# Patient Record
Sex: MALE | Race: BLACK OR AFRICAN AMERICAN | Employment: OTHER | URBAN - METROPOLITAN AREA
[De-identification: names, ages, dates, MRNs, and addresses within clinical notes are randomized per-mention and may not be internally consistent; named-entity substitution may affect disease eponyms.]

---

## 2017-03-26 ENCOUNTER — ED HISTORICAL/CONVERTED ENCOUNTER (OUTPATIENT)
Dept: OTHER | Age: 62
End: 2017-03-26

## 2017-03-30 ENCOUNTER — OP HISTORICAL/CONVERTED ENCOUNTER (OUTPATIENT)
Dept: OTHER | Age: 62
End: 2017-03-30

## 2017-12-05 ENCOUNTER — ED HISTORICAL/CONVERTED ENCOUNTER (OUTPATIENT)
Dept: OTHER | Age: 62
End: 2017-12-05

## 2018-04-20 ENCOUNTER — ED HISTORICAL/CONVERTED ENCOUNTER (OUTPATIENT)
Dept: OTHER | Age: 63
End: 2018-04-20

## 2018-12-12 ENCOUNTER — ED HISTORICAL/CONVERTED ENCOUNTER (OUTPATIENT)
Dept: OTHER | Age: 63
End: 2018-12-12

## 2018-12-14 ENCOUNTER — OP HISTORICAL/CONVERTED ENCOUNTER (OUTPATIENT)
Dept: OTHER | Age: 63
End: 2018-12-14

## 2019-02-19 ENCOUNTER — OP HISTORICAL/CONVERTED ENCOUNTER (OUTPATIENT)
Dept: OTHER | Age: 64
End: 2019-02-19

## 2020-08-05 ENCOUNTER — ED HISTORICAL/CONVERTED ENCOUNTER (OUTPATIENT)
Dept: OTHER | Age: 65
End: 2020-08-05

## 2020-11-10 ENCOUNTER — HOSPITAL ENCOUNTER (EMERGENCY)
Age: 65
Discharge: HOME | End: 2020-11-10
Attending: EMERGENCY MEDICINE
Payer: COMMERCIAL

## 2020-11-10 ENCOUNTER — APPOINTMENT (OUTPATIENT)
Dept: GENERAL RADIOLOGY | Age: 65
End: 2020-11-10
Attending: EMERGENCY MEDICINE
Payer: COMMERCIAL

## 2020-11-10 VITALS
SYSTOLIC BLOOD PRESSURE: 132 MMHG | WEIGHT: 212 LBS | HEIGHT: 65 IN | DIASTOLIC BLOOD PRESSURE: 77 MMHG | BODY MASS INDEX: 35.32 KG/M2 | OXYGEN SATURATION: 97 % | RESPIRATION RATE: 18 BRPM | HEART RATE: 81 BPM | TEMPERATURE: 98.1 F

## 2020-11-10 DIAGNOSIS — M17.11 ARTHRITIS OF RIGHT KNEE: Primary | ICD-10-CM

## 2020-11-10 DIAGNOSIS — M16.11 ARTHRITIS OF RIGHT HIP: ICD-10-CM

## 2020-11-10 PROCEDURE — 74011250637 HC RX REV CODE- 250/637: Performed by: EMERGENCY MEDICINE

## 2020-11-10 PROCEDURE — 99283 EMERGENCY DEPT VISIT LOW MDM: CPT

## 2020-11-10 PROCEDURE — 73562 X-RAY EXAM OF KNEE 3: CPT

## 2020-11-10 PROCEDURE — 73502 X-RAY EXAM HIP UNI 2-3 VIEWS: CPT

## 2020-11-10 RX ORDER — IBUPROFEN AND FAMOTIDINE 800; 26.6 MG/1; MG/1
1 TABLET, COATED ORAL 2 TIMES DAILY
COMMUNITY
End: 2021-07-28 | Stop reason: ALTCHOICE

## 2020-11-10 RX ORDER — LISINOPRIL AND HYDROCHLOROTHIAZIDE 10; 12.5 MG/1; MG/1
1 TABLET ORAL DAILY
COMMUNITY
End: 2021-07-28 | Stop reason: ALTCHOICE

## 2020-11-10 RX ORDER — LANOLIN ALCOHOL/MO/W.PET/CERES
400 CREAM (GRAM) TOPICAL DAILY
COMMUNITY
End: 2021-07-28 | Stop reason: ALTCHOICE

## 2020-11-10 RX ORDER — ACETAMINOPHEN 325 MG/1
650 TABLET ORAL
Status: COMPLETED | OUTPATIENT
Start: 2020-11-10 | End: 2020-11-10

## 2020-11-10 RX ORDER — POTASSIUM CHLORIDE 750 MG/1
10 TABLET, FILM COATED, EXTENDED RELEASE ORAL DAILY
Status: ON HOLD | COMMUNITY
End: 2022-04-21

## 2020-11-10 RX ORDER — NEBIVOLOL 20 MG/1
20 TABLET ORAL DAILY
COMMUNITY

## 2020-11-10 RX ORDER — GUAIFENESIN 100 MG/5ML
81 LIQUID (ML) ORAL DAILY
COMMUNITY
End: 2022-04-22

## 2020-11-10 RX ORDER — ACETAMINOPHEN AND CODEINE PHOSPHATE 300; 30 MG/1; MG/1
1 TABLET ORAL
Qty: 18 TAB | Refills: 0 | Status: SHIPPED | OUTPATIENT
Start: 2020-11-10 | End: 2020-11-13

## 2020-11-10 RX ORDER — ERGOCALCIFEROL 1.25 MG/1
50000 CAPSULE ORAL DAILY
COMMUNITY
End: 2021-07-28 | Stop reason: ALTCHOICE

## 2020-11-10 RX ORDER — SITAGLIPTIN AND METFORMIN HYDROCHLORIDE 50; 1000 MG/1; MG/1
1 TABLET, FILM COATED ORAL 2 TIMES DAILY WITH MEALS
COMMUNITY
End: 2022-09-16

## 2020-11-10 RX ORDER — AMLODIPINE BESYLATE 10 MG/1
10 TABLET ORAL DAILY
COMMUNITY

## 2020-11-10 RX ORDER — DULAGLUTIDE 0.75 MG/.5ML
0.75 INJECTION, SOLUTION SUBCUTANEOUS
COMMUNITY
End: 2021-07-28 | Stop reason: ALTCHOICE

## 2020-11-10 RX ORDER — ROSUVASTATIN CALCIUM 40 MG/1
40 TABLET, COATED ORAL
COMMUNITY
End: 2021-07-28 | Stop reason: ALTCHOICE

## 2020-11-10 RX ADMIN — ACETAMINOPHEN 650 MG: 325 TABLET, FILM COATED ORAL at 16:03

## 2020-11-10 NOTE — ED TRIAGE NOTES
Bilateral knee pain x 6 weeks with pain worse in the right lateral knee; left hip aching, and both ankles hurt; denies injury; ambulates with a cane

## 2020-11-10 NOTE — DISCHARGE INSTRUCTIONS
Medicines as prescribed and follow-up with your PCP in 3 to 5 days as needed. Return to emergency room for any new or worsening symptoms.

## 2020-11-12 NOTE — ED PROVIDER NOTES
EMERGENCY DEPARTMENT HISTORY AND PHYSICAL EXAM      Date: 11/10/2020  Patient Name: Ted Del Rosario    History of Presenting Illness     Chief Complaint   Patient presents with    Knee Pain       History Provided By: Patient    HPI: Ted Del Rosario, 63 y.o. male with a past medical history significant diabetes, hypertension and  & CAD presents to the ED with cc of bilateral knee pain recurrent for the last 6 weeks  There are no other complaints, changes, or physical findings at this time. PCP: Claudette Silber, FNP    No current facility-administered medications on file prior to encounter. Current Outpatient Medications on File Prior to Encounter   Medication Sig Dispense Refill    aspirin 81 mg chewable tablet Take 81 mg by mouth daily.  nebivoloL (Bystolic) 20 mg tablet Take 20 mg by mouth daily.  amLODIPine (NORVASC) 10 mg tablet Take  by mouth daily.  lisinopril-hydroCHLOROthiazide (PRINZIDE, ZESTORETIC) 10-12.5 mg per tablet Take 1 Tab by mouth daily.  rosuvastatin (CRESTOR) 40 mg tablet Take 40 mg by mouth nightly.  SITagliptin-metFORMIN (Janumet) 50-1,000 mg per tablet Take 1 Tab by mouth two (2) times daily (with meals).  ergocalciferol (Vitamin D2) 1,250 mcg (50,000 unit) capsule Take 50,000 Units by mouth daily.  magnesium oxide (MAG-OX) 400 mg tablet Take 400 mg by mouth daily.  potassium chloride SR (KLOR-CON 10) 10 mEq tablet Take 10 mEq by mouth daily.  dulaglutide (Trulicity) 1.04 FD/4.6 mL sub-q pen 0.75 mg by SubCUTAneous route every seven (7) days.  ibuprofen-famotidine (Duexis) 800-26.6 mg tab Take 1 Tab by mouth two (2) times a day.  dapagliflozin (Farxiga) 10 mg tab tablet Take 10 mg by mouth daily. Past History     Past Medical History:  Past Medical History:   Diagnosis Date    CAD (coronary artery disease)     Diabetes ([address]     Hypertension        Past Surgical History:  History reviewed.  No pertinent surgical history. Family History:  History reviewed. No pertinent family history. Social History:  Social History     Tobacco Use    Smoking status: Never Smoker    Smokeless tobacco: Never Used   Substance Use Topics    Alcohol use: Never     Frequency: Never    Drug use: Never       Allergies: Allergies   Allergen Reactions    Cortisone Anaphylaxis         Review of Systems     Review of Systems   Constitutional: Negative. HENT: Negative. Eyes: Negative. Respiratory: Negative. Cardiovascular: Negative. Gastrointestinal: Negative. Endocrine: Negative. Genitourinary: Negative. Musculoskeletal: Positive for arthralgias. Skin: Negative. Allergic/Immunologic: Negative. Neurological: Negative. Hematological: Negative. Psychiatric/Behavioral: Negative. All other systems reviewed and are negative. Physical Exam     Physical Exam  Vitals signs and nursing note reviewed. Constitutional:       General: He is not in acute distress. Appearance: He is well-developed. He is not toxic-appearing or diaphoretic. HENT:      Head: Normocephalic and atraumatic. Nose: Nose normal.      Mouth/Throat:      Mouth: Mucous membranes are moist.      Pharynx: Oropharynx is clear. Eyes:      Extraocular Movements: Extraocular movements intact. Pupils: Pupils are equal, round, and reactive to light. Neck:      Musculoskeletal: Normal range of motion and neck supple. Cardiovascular:      Rate and Rhythm: Normal rate and regular rhythm. Heart sounds: Normal heart sounds. Pulmonary:      Effort: Pulmonary effort is normal. No respiratory distress. Breath sounds: Normal breath sounds. Chest:      Chest wall: No mass or tenderness. Abdominal:      General: Bowel sounds are normal. There is no abdominal bruit. Palpations: Abdomen is soft. There is no hepatomegaly. Tenderness: There is no abdominal tenderness. There is no rebound. Musculoskeletal: Normal range of motion. General: Tenderness present. Right lower leg: He exhibits no tenderness. No edema. Left lower leg: He exhibits no tenderness. No edema. Comments: Right knee   Skin:     General: Skin is warm and dry. Capillary Refill: Capillary refill takes less than 2 seconds. Neurological:      General: No focal deficit present. Mental Status: He is alert and oriented to person, place, and time. Psychiatric:         Mood and Affect: Mood normal.         Behavior: Behavior normal.         Lab and Diagnostic Study Results     Labs -   No results found for this or any previous visit (from the past 12 hour(s)). Radiologic Studies -     CT Results  (Last 48 hours)    None        CXR Results  (Last 48 hours)    None            Medical Decision Making   - I am the first provider for this patient. - I reviewed the vital signs, available nursing notes, past medical history, past surgical history, family history and social history. - Initial assessment performed. The patients presenting problems have been discussed, and they are in agreement with the care plan formulated and outlined with them. I have encouraged them to ask questions as they arise throughout their visit. Vital Signs-Reviewed the patient's vital signs. No data found. Records Reviewed: Nursing Notes      ED Course/Provider Notes (Medical Decision Making): Uneventful ED course, clinical improvement with therapy, patient will be discharged to followup with PCP as directed    Disposition     Disposition: Condition stable and improved  DC- Adult Discharges: All of the diagnostic tests were reviewed and questions answered. Diagnosis, care plan and treatment options were discussed. The patient understands the instructions and will follow up as directed. The patients results have been reviewed with them. They have been counseled regarding their diagnosis.   The patient verbally convey understanding and agreement of the signs, symptoms, diagnosis, treatment and prognosis and additionally agrees to follow up as recommended with their PCP in 24 - 48 hours. They also agree with the care-plan and convey that all of their questions have been answered. I have also put together some discharge instructions for them that include: 1) educational information regarding their diagnosis, 2) how to care for their diagnosis at home, as well a 3) list of reasons why they would want to return to the ED prior to their follow-up appointment, should their condition change. Discharged    DISCHARGE PLAN:  1. Cannot display discharge medications since this patient is not currently admitted. 2.   Follow-up Information     Follow up With Specialties Details Why Contact Info    Claudette Silber, [address] Nurse Practitioner In 3 days  [address]  [facility] [address]  [phone]          3. Return to ED if worse   4. Discharge Medication List as of 11/10/2020  4:46 PM      START taking these medications    Details   acetaminophen-codeine (Tylenol-Codeine #3) 300-30 mg per tablet Take 1 Tab by mouth every four (4) hours as needed for Pain for up to 3 days. Max Daily Amount: 6 Tabs., Normal, Disp-18 Tab,R-0         CONTINUE these medications which have NOT CHANGED    Details   aspirin 81 mg chewable tablet Take 81 mg by mouth daily. , Historical Med      nebivoloL (Bystolic) 20 mg tablet Take 20 mg by mouth daily. , Historical Med      amLODIPine (NORVASC) 10 mg tablet Take  by mouth daily. , Historical Med      lisinopril-hydroCHLOROthiazide (PRINZIDE, ZESTORETIC) 10-12.5 mg per tablet Take 1 Tab by mouth daily. , Historical Med      rosuvastatin (CRESTOR) 40 mg tablet Take 40 mg by mouth nightly., Historical Med      SITagliptin-metFORMIN (Janumet) 50-1,000 mg per tablet Take 1 Tab by mouth two (2) times daily (with meals). , Historical Med      ergocalciferol (Vitamin D2) 1,250 mcg (50,000 unit) capsule Take 50,000 Units by mouth daily. , Historical Med      magnesium oxide (MAG-OX) 400 mg tablet Take 400 mg by mouth daily. , Historical Med      potassium chloride SR (KLOR-CON 10) 10 mEq tablet Take 10 mEq by mouth daily. , Historical Med      dulaglutide (Trulicity) 7.40 AH/6.5 mL sub-q pen 0.75 mg by SubCUTAneous route every seven (7) days. , Historical Med      ibuprofen-famotidine (Duexis) 800-26.6 mg tab Take 1 Tab by mouth two (2) times a day., Historical Med      dapagliflozin (Farxiga) 10 mg tab tablet Take 10 mg by mouth daily. , Historical Med               Diagnosis     Clinical Impression:   1. Arthritis of right knee    2. Arthritis of right hip        Attestations:    Becky Mejia MD    Please note that this dictation was completed with oohilove, the [employer] voice recognition software. Quite often unanticipated grammatical, syntax, homophones, and other interpretive errors are inadvertently transcribed by the computer software. Please disregard these errors. Please excuse any errors that have escaped final proofreading. Thank you.

## 2021-07-05 ENCOUNTER — TRANSCRIBE ORDER (OUTPATIENT)
Dept: SCHEDULING | Age: 66
End: 2021-07-05

## 2021-07-05 DIAGNOSIS — M54.50 LOW BACK PAIN: Primary | ICD-10-CM

## 2021-07-22 ENCOUNTER — HOSPITAL ENCOUNTER (OUTPATIENT)
Dept: MRI IMAGING | Age: 66
Discharge: HOME | End: 2021-07-22
Payer: COMMERCIAL

## 2021-07-22 DIAGNOSIS — M54.50 LOW BACK PAIN: ICD-10-CM

## 2021-07-22 PROCEDURE — 72148 MRI LUMBAR SPINE W/O DYE: CPT

## 2021-07-28 ENCOUNTER — OFFICE VISIT (OUTPATIENT)
Dept: ENDOCRINOLOGY | Age: 66
End: 2021-07-28
Payer: COMMERCIAL

## 2021-07-28 VITALS
DIASTOLIC BLOOD PRESSURE: 88 MMHG | OXYGEN SATURATION: 98 % | BODY MASS INDEX: 36.24 KG/M2 | HEART RATE: 66 BPM | SYSTOLIC BLOOD PRESSURE: 147 MMHG | TEMPERATURE: 97.7 F | WEIGHT: 217.5 LBS | HEIGHT: 65 IN

## 2021-07-28 DIAGNOSIS — E83.52 HYPERCALCEMIA: Primary | ICD-10-CM

## 2021-07-28 DIAGNOSIS — I10 BENIGN ESSENTIAL HTN: ICD-10-CM

## 2021-07-28 DIAGNOSIS — E55.9 VITAMIN D DEFICIENCY: ICD-10-CM

## 2021-07-28 PROBLEM — E78.2 MIXED HYPERLIPIDEMIA: Status: ACTIVE | Noted: 2021-07-28

## 2021-07-28 PROBLEM — E11.65 TYPE 2 DIABETES MELLITUS WITH HYPERGLYCEMIA, WITHOUT LONG-TERM CURRENT USE OF INSULIN (HCC): Status: ACTIVE | Noted: 2021-07-28

## 2021-07-28 PROCEDURE — 99204 OFFICE O/P NEW MOD 45 MIN: CPT | Performed by: INTERNAL MEDICINE

## 2021-07-28 RX ORDER — ACETAMINOPHEN 500 MG
2000 TABLET ORAL DAILY
Qty: 30 CAPSULE | Refills: 3 | Status: SHIPPED | OUTPATIENT
Start: 2021-07-28 | End: 2021-08-27

## 2021-07-28 RX ORDER — LISINOPRIL 40 MG/1
40 TABLET ORAL DAILY
Qty: 30 TABLET | Refills: 3 | Status: SHIPPED | OUTPATIENT
Start: 2021-07-28 | End: 2021-08-27

## 2021-07-28 RX ORDER — TRAMADOL HYDROCHLORIDE 50 MG/1
50 TABLET ORAL EVERY 12 HOURS
Status: ON HOLD | COMMUNITY
Start: 2021-06-10 | End: 2022-04-22 | Stop reason: SDUPTHER

## 2021-07-28 RX ORDER — LISINOPRIL AND HYDROCHLOROTHIAZIDE 12.5; 2 MG/1; MG/1
TABLET ORAL DAILY
COMMUNITY
End: 2021-09-09 | Stop reason: ALTCHOICE

## 2021-07-28 NOTE — PROGRESS NOTES
History and Physical    Patient: Franci Mark MRN: [id number]  SSN: [id number]    YOB: 1955  Age: 61 y.o. Sex: male      Subjective:      Franci Mark is a 61 y.o. male with past medical history of type 2 diabetes mellitus, hypertension, coronary artery disease, hyperlipidemia is sent to me by primary care provider Ana Panda NP for hypercalcemia. Onset: 1 year back    Symptoms of hypercalcemia: Constipation, fatigue. Denies any polyuria, polydipsia, stomach pain, kidney stones, bone fractures, episodes of confusion. Lithium/ HCTZ: Patient is taking lisinopril-hydrochlorothiazide 20-12.5 mg daily for hypertension, he has been on this for the past 7 years or more    Calcium supplement: No calcium tablets, no Tums, no significant dairy intake    Vit D supplement: He was on vitamin D 50,000 units once a week which was stopped a few months back    Osteoporosis/fragility fracture: None    Dental issues: No current dental issues    Family history of hypercalcemia/hyperparathyroidism/osteoporosis: None to his knowledge    Past Medical History:   Diagnosis Date    CAD (coronary artery disease)     Diabetes ([facility] [address]     Hypertension      Past Surgical History:   Procedure Laterality Date    HX CATARACT REMOVAL Bilateral       Family History   Problem Relation Age of Onset    Diabetes Mother      Social History     Tobacco Use    Smoking status: Never Smoker    Smokeless tobacco: Never Used   Substance Use Topics    Alcohol use: Never      Prior to Admission medications    Medication Sig Start Date End Date Taking? Authorizing Provider   traMADoL Sheba Sauceda) 50 mg tablet  6/10/21  Yes Provider, Historical   lisinopril-hydroCHLOROthiazide (PRINZIDE, ZESTORETIC) 20-12.5 mg per tablet Take  by mouth daily. Yes Provider, Historical   lisinopriL (PRINIVIL, ZESTRIL) 40 mg tablet Take 1 Tablet by mouth daily for 30 days.  7/28/21 8/27/21 Yes Justina Paul MD   cholecalciferol (VITAMIN D3) (2,000 UNITS /50 MCG) cap capsule Take 1 Capsule by mouth daily for 30 days. 7/28/21 8/27/21 Yes Edilson Rios MD   aspirin 81 mg chewable tablet Take 81 mg by mouth daily. Yes Other, MD [place]   nebivoloL (Bystolic) 20 mg tablet Take 20 mg by mouth daily. Yes Other, MD [place]   amLODIPine (NORVASC) 10 mg tablet Take  by mouth daily. Yes Other, MD [place]   SITagliptin-metFORMIN (Janumet) 50-1,000 mg per tablet Take 1 Tab by mouth two (2) times daily (with meals). Yes Other, MD [place]   potassium chloride SR (KLOR-CON 10) 10 mEq tablet Take 10 mEq by mouth daily. Yes Shruti, MD [place]   dapagliflozin (Farxiga) 10 mg tab tablet Take 10 mg by mouth daily. Yes Other, MD [place]        Allergies   Allergen Reactions    Cortisone Anaphylaxis       Review of Systems:  ROS    A comprehensive review of systems was preformed and it is negative except numbness and tingling, memory issues, anxiety, sleep problems, back pain    Objective:     Vitals:    07/28/21 1552 07/28/21 1559   BP: (!) 156/85 (!) 147/88   Pulse: 73 66   Temp: 97.7 °F (36.5 °C)    TempSrc: Temporal    SpO2: 98%    Weight: 217 lb 8 oz (98.7 kg)    Height: 5' 5\" (1.651 m)         Physical Exam:    Physical Exam  Vitals and nursing note reviewed. Constitutional:       Appearance: Normal appearance. HENT:      Head: Normocephalic and atraumatic. Neck:      Comments: Thyroid normal on palpation  Cardiovascular:      Rate and Rhythm: Normal rate and regular rhythm. Pulmonary:      Effort: Pulmonary effort is normal.      Breath sounds: Normal breath sounds. Abdominal:      General: Bowel sounds are normal.      Palpations: Abdomen is soft. Musculoskeletal:         General: No swelling. Normal range of motion. Cervical back: Neck supple. Skin:     General: Skin is warm. Neurological:      General: No focal deficit present. Mental Status: He is alert and oriented to person, place, and time.    Psychiatric:         Mood and Affect: Mood normal.         Behavior: Behavior normal.          Labs and Imaging:    Last 3 Recorded Weights in this Encounter    07/28/21 1552   Weight: 217 lb 8 oz (98.7 kg)        No results found for: HBA1C, LRE2AEBT, DGM2DAPS, NCO0UTZD     Assessment:     Patient Active Problem List   Diagnosis Code    Hypercalcemia E83.52    Benign essential HTN I10    Type 2 diabetes mellitus with hyperglycemia, without long-term current use of insulin ([facility] E11.65    Mixed hyperlipidemia E78.2           Plan:     Hypercalcemia:  I reviewed labs and notes from the referring provider's office. :  Calcium slightly elevated at 10.8 (8.6-10.2)  25 hydroxy vitamin D low at 15.1    11-:  Ionized calcium borderline elevated at 5.7 (4.5-5.6)  Magnesium lower end of normal range at 1.6  Phosphorus lower end of normal range at 3 (2.8-4.1)  25 hydroxy vitamin D low at 17.8    1-:  Calcium elevated at 11.1 (8.6-10.2)    3-:  Normal hemoglobin and hematocrit    5-:  Calcium elevated at 10.7 (8.6-10.2)    5-:  25 hydroxy vitamin D low at 16  Ionized calcium slightly elevated at 5.8 (4.5-5.6)  PTH normal at 46 (15-65)  Renal function normal.    Patient is on lisinopril-hydrochlorothiazide 20-12.5 mg daily  Plan:  Clinically patient does not have symptoms of hypercalcemia. Looking at the trend of labs, calcium stays only borderline elevated.   Differential diagnosis: Calcium elevation because of hydrochlorothiazide versus primary hyperparathyroidism  Stop lisinopril-hydrochlorothiazide  Replace it with lisinopril 40 mg daily  Start gentle vitamin D replacement with 2000 units daily  Check the following labs before next visit in 6 weeks    Vitamin D deficiency:  25 hydroxy vitamin D low at 16 on 5-  Agree with avoiding high-dose vitamin D replacement  Start gentle replacement with cholecalciferol 2000 units daily  Check vitamin D level prior to next visit    Mixed hyperlipidemia:  He was on rosuvastatin. Not sure why it was stopped. There is no contraindication to being on rosuvastatin from endocrine standpoint because of hypercalcemia. He will benefit from going back on rosuvastatin given history of diabetes and coronary artery disease. Essential hypertension:  Blood pressure acceptable today on current medications. However I am going to switch him from lisinopril-hydrochlorothiazide 20-12.5 mg daily to lisinopril 40 mg daily because of hypercalcemia. Orders Placed This Encounter    METABOLIC PANEL, COMPREHENSIVE     Standing Status:   Future     Standing Expiration Date:   1/28/2022    PTH INTACT     Standing Status:   Future     Standing Expiration Date:   1/28/2022    PHOSPHORUS     Standing Status:   Future     Standing Expiration Date:   1/28/2022    MAGNESIUM     Standing Status:   Future     Standing Expiration Date:   1/28/2022    VITAMIN D, 25 HYDROXY     Standing Status:   Future     Standing Expiration Date:   1/28/2022    VITAMIN D, 1, 25 DIHYDROXY     Standing Status:   Future     Standing Expiration Date:   1/28/2022    lisinopriL (PRINIVIL, ZESTRIL) 40 mg tablet     Sig: Take 1 Tablet by mouth daily for 30 days. Dispense:  30 Tablet     Refill:  3     DC Lisinopril-HCTZ    cholecalciferol (VITAMIN D3) (2,000 UNITS /50 MCG) cap capsule     Sig: Take 1 Capsule by mouth daily for 30 days.      Dispense:  30 Capsule     Refill:  3        Signed By: Ko Nobles MD     July 28, 2021      Return to clinic 6 weeks

## 2021-07-28 NOTE — LETTER
7/28/2021    Patient: Jacqlyn Curling   YOB: 1955   Date of Visit: 7/28/2021     Milan Gonzales [facility] [address] [address]  Via Fax: [phone]    Dear TANVI Gonzales,      Thank you for referring Mr. Perla Zaragoza to [address] [place] [address] for evaluation. My notes for this consultation are attached. If you have questions, please do not hesitate to call me. I look forward to following your patient along with you.       Sincerely,    Mary Jack MD

## 2021-08-28 DIAGNOSIS — E83.52 HYPERCALCEMIA: ICD-10-CM

## 2021-09-09 ENCOUNTER — OFFICE VISIT (OUTPATIENT)
Dept: ENDOCRINOLOGY | Age: 66
End: 2021-09-09

## 2021-09-09 VITALS
HEIGHT: 65 IN | OXYGEN SATURATION: 98 % | SYSTOLIC BLOOD PRESSURE: 146 MMHG | WEIGHT: 218.3 LBS | BODY MASS INDEX: 36.37 KG/M2 | HEART RATE: 104 BPM | TEMPERATURE: 97.7 F | DIASTOLIC BLOOD PRESSURE: 81 MMHG

## 2021-09-09 RX ORDER — LISINOPRIL 40 MG/1
TABLET ORAL
COMMUNITY
Start: 2021-08-27 | End: 2021-09-27 | Stop reason: SDUPTHER

## 2021-09-09 RX ORDER — EXENATIDE 2 MG/.85ML
INJECTION, SUSPENSION, EXTENDED RELEASE SUBCUTANEOUS
COMMUNITY
Start: 2021-08-27 | End: 2022-09-16

## 2021-09-09 NOTE — PROGRESS NOTES
History and Physical    Patient: Ada Kemp MRN: [id number]  SSN: [id number]    YOB: 1955  Age: 61 y.o. Sex: male      Subjective:      Ada Kemp is a 61 y.o. male with past medical history of type 2 diabetes mellitus, hypertension, coronary artery disease, hyperlipidemia is sent to me by primary care provider Elisabeth Blevins NP for hypercalcemia. Onset: 1 year back    Symptoms of hypercalcemia: Constipation, fatigue. Denies any polyuria, polydipsia, stomach pain, kidney stones, bone fractures, episodes of confusion. Lithium/ HCTZ: Patient is taking lisinoprilhydrochlorothiazide 20-12.5 mg daily for hypertension, he has been on this for the past 7 years or more    Calcium supplement: No calcium tablets, no Tums, no significant dairy intake    Vit D supplement: He was on vitamin D 50,000 units once a week which was stopped a few months back    Osteoporosis/fragility fracture: None    Dental issues: No current dental issues    Family history of hypercalcemia/hyperparathyroidism/osteoporosis: None to his knowledge    Past Medical History:   Diagnosis Date    CAD (coronary artery disease)     [facility] ([facility]     Hypertension      Past Surgical History:   Procedure Laterality Date    HX CATARACT REMOVAL Bilateral       Family History   Problem Relation Age of Onset    Diabetes Mother      Social History     Tobacco Use    Smoking status: Never Smoker    Smokeless tobacco: Never Used   Substance Use Topics    Alcohol use: Never      Prior to Admission medications    Medication Sig Start Date End Date Taking? Authorizing Provider   Cindy BCise 2 mg/0.85 mL atIn  8/27/21  Yes Provider, Historical   lisinopriL (PRINIVIL, ZESTRIL) 40 mg tablet  8/27/21  Yes Provider, Historical   traMADoL (ULTRAM) 50 mg tablet  6/10/21  Yes Provider, Historical   aspirin 81 mg chewable tablet Take 81 mg by mouth daily.    Yes Other, MD [place]   nebivoloL (Bystolic) 20 mg tablet Take 20 mg by mouth daily. Yes Other, MD [place]   amLODIPine (NORVASC) 10 mg tablet Take  by mouth daily. Yes Other, MD [place]   SITagliptin-metFORMIN (Janumet) 50-1,000 mg per tablet Take 1 Tab by mouth two (2) times daily (with meals). Yes Other, MD [place]   potassium chloride SR (KLOR-CON 10) 10 mEq tablet Take 10 mEq by mouth daily. Yes Shruti, MD [place]   dapagliflozin (Farxiga) 10 mg tab tablet Take 10 mg by mouth daily. Yes Shruti, MD [place]        Allergies   Allergen Reactions    Cortisone Anaphylaxis       Review of Systems:  ROS    A comprehensive review of systems was preformed and it is negative except numbness and tingling, memory issues, anxiety, sleep problems, back pain    Objective:     Vitals:    09/09/21 1620   BP: (!) 146/81   Pulse: (!) 104   Temp: 97.7 °F (36.5 °C)   TempSrc: Temporal   SpO2: 98%   Weight: 218 lb 4.8 oz (99 kg)   Height: 5' 5\" (1.651 m)        Physical Exam:    Physical Exam  Vitals and nursing note reviewed. Constitutional:       Appearance: Normal appearance. HENT:      Head: Normocephalic and atraumatic. Neck:      Comments: Thyroid normal on palpation  Cardiovascular:      Rate and Rhythm: Normal rate and regular rhythm. Pulmonary:      Effort: Pulmonary effort is normal.      Breath sounds: Normal breath sounds. Abdominal:      General: Bowel sounds are normal.      Palpations: Abdomen is soft. Musculoskeletal:         General: No swelling. Normal range of motion. Cervical back: Neck supple. Skin:     General: Skin is warm. Neurological:      General: No focal deficit present. Mental Status: He is alert and oriented to person, place, and time.    Psychiatric:         Mood and Affect: Mood normal.         Behavior: Behavior normal.          Labs and Imaging:    Last 3 Recorded Weights in this Encounter    09/09/21 1620   Weight: 218 lb 4.8 oz (99 kg)        No results found for: HBA1C, UUT5POPF, XNF8OBOQ, HPG1QFCI     Assessment:     Patient Active Problem List   Diagnosis Code    Hypercalcemia E83.52    Benign essential HTN I10    Type 2 diabetes mellitus with hyperglycemia, without long-term current use of insulin ([facility]) E11.65    Mixed hyperlipidemia E78.2           Plan:     Hypercalcemia:  408534:  Calcium slightly elevated at 10.8 (8.610.2)  25 hydroxy vitamin D low at 15.1    11-:  Ionized calcium borderline elevated at 5.7 (4.55.6)  Magnesium lower end of normal range at 1.6  Phosphorus lower end of normal range at 3 (2.84.1)  25 hydroxy vitamin D low at 17.8    1-:  Calcium elevated at 11.1 (8.610.2)    3-:  Normal hemoglobin and hematocrit    5-:  Calcium elevated at 10.7 (8.610.2)    5-:  25 hydroxy vitamin D low at 16  Ionized calcium slightly elevated at 5.8 (4.55.6)  PTH normal at 46 (1565)  Renal function normal.    Patient is on lisinoprilhydrochlorothiazide 20-12.5 mg daily  Plan:  Clinically patient does not have symptoms of hypercalcemia. Looking at the trend of labs, calcium stays only borderline elevated. Differential diagnosis: Calcium elevation because of hydrochlorothiazide versus primary hyperparathyroidism  Stop lisinoprilhydrochlorothiazide  Replace it with lisinopril 40 mg daily  Start gentle vitamin D replacement with 2000 units daily  Check the following labs before next visit in 6 weeks    Vitamin D deficiency:  25 hydroxy vitamin D low at 16 on 5-  Agree with avoiding high-dose vitamin D replacement  Start gentle replacement with cholecalciferol 2000 units daily  Check vitamin D level prior to next visit    Mixed hyperlipidemia:  He was on rosuvastatin. Not sure why it was stopped. There is no contraindication to being on rosuvastatin from endocrine standpoint because of hypercalcemia. He will benefit from going back on rosuvastatin given history of diabetes and coronary artery disease. Essential hypertension:  Blood pressure acceptable today on current medications.   However I am going to switch him from lisinoprilhydrochlorothiazide 20-12.5 mg daily to lisinopril 40 mg daily because of hypercalcemia. No orders of the defined types were placed in this encounter.        Signed By: Sarwat Alfaro MD     September 9, 2021      Return to clinic 6 weeks

## 2021-09-11 LAB
1,25(OH)2D SERPL-MCNC: 46.7 PG/ML (ref 19.9–79.3)
25(OH)D3+25(OH)D2 SERPL-MCNC: 18.5 NG/ML (ref 30–100)
ALBUMIN SERPL-MCNC: 4.3 G/DL (ref 3.8–4.8)
ALBUMIN/GLOB SERPL: 1.3 {RATIO} (ref 1.2–2.2)
ALP SERPL-CCNC: 91 IU/L (ref 48–121)
ALT SERPL-CCNC: 33 IU/L (ref 0–44)
AST SERPL-CCNC: 26 IU/L (ref 0–40)
BILIRUB SERPL-MCNC: 0.4 MG/DL (ref 0–1.2)
BUN SERPL-MCNC: 9 MG/DL (ref 8–27)
BUN/CREAT SERPL: 9 (ref 10–24)
CALCIUM SERPL-MCNC: 10.5 MG/DL (ref 8.6–10.2)
CHLORIDE SERPL-SCNC: 106 MMOL/L (ref 96–106)
CO2 SERPL-SCNC: 24 MMOL/L (ref 20–29)
CREAT SERPL-MCNC: 1.03 MG/DL (ref 0.76–1.27)
GLOBULIN SER CALC-MCNC: 3.3 G/DL (ref 1.5–4.5)
GLUCOSE SERPL-MCNC: 164 MG/DL (ref 65–99)
MAGNESIUM SERPL-MCNC: 1.8 MG/DL (ref 1.6–2.3)
PHOSPHATE SERPL-MCNC: 3 MG/DL (ref 2.8–4.1)
POTASSIUM SERPL-SCNC: 3.9 MMOL/L (ref 3.5–5.2)
PROT SERPL-MCNC: 7.6 G/DL (ref 6–8.5)
PTH-INTACT SERPL-MCNC: 67 PG/ML (ref 15–65)
SODIUM SERPL-SCNC: 140 MMOL/L (ref 134–144)

## 2021-09-27 ENCOUNTER — OFFICE VISIT (OUTPATIENT)
Dept: ENDOCRINOLOGY | Age: 66
End: 2021-09-27
Payer: COMMERCIAL

## 2021-09-27 VITALS
TEMPERATURE: 97.7 F | SYSTOLIC BLOOD PRESSURE: 159 MMHG | HEART RATE: 75 BPM | WEIGHT: 219 LBS | DIASTOLIC BLOOD PRESSURE: 92 MMHG | BODY MASS INDEX: 36.49 KG/M2 | OXYGEN SATURATION: 98 % | HEIGHT: 65 IN

## 2021-09-27 DIAGNOSIS — E21.0 PRIMARY HYPERPARATHYROIDISM (HCC): Primary | ICD-10-CM

## 2021-09-27 DIAGNOSIS — E55.9 VITAMIN D DEFICIENCY: ICD-10-CM

## 2021-09-27 DIAGNOSIS — I10 BENIGN ESSENTIAL HTN: ICD-10-CM

## 2021-09-27 PROCEDURE — 99214 OFFICE O/P EST MOD 30 MIN: CPT | Performed by: INTERNAL MEDICINE

## 2021-09-27 RX ORDER — ACETAMINOPHEN 500 MG
2000 TABLET ORAL 2 TIMES DAILY
Qty: 60 CAPSULE | Refills: 3 | Status: SHIPPED | OUTPATIENT
Start: 2021-09-27 | End: 2021-11-30 | Stop reason: SDUPTHER

## 2021-09-27 RX ORDER — LISINOPRIL 40 MG/1
40 TABLET ORAL DAILY
Qty: 90 TABLET | Refills: 1 | Status: SHIPPED | OUTPATIENT
Start: 2021-09-27 | End: 2022-01-20 | Stop reason: SDUPTHER

## 2021-09-27 RX ORDER — GABAPENTIN 300 MG/1
300 CAPSULE ORAL 2 TIMES DAILY
COMMUNITY
Start: 2021-09-16 | End: 2022-05-12

## 2021-09-27 NOTE — PROGRESS NOTES
History and Physical    Patient: Colin Hood MRN: [id number]  SSN: [id number]    YOB: 1955  Age: 61 y.o. Sex: male      Subjective:      Colin Hood is a 61 y.o. male with past medical history of type 2 diabetes mellitus, hypertension, coronary artery disease, hyperlipidemia is here for follow-up of hypercalcemia. He was sent to me by primary care provider Stan Rose NP     at the last visit we stopped lisinopril-hydrochlorothiazide and switched him to just lisinopril 40 mg daily. After this patient had labs done to follow-up on hypercalcemia. Denies any change in his symptoms. Also at the last visit he was started on vitamin D 2000 units every day for vitamin D deficiency. Onset: 1 year back    Symptoms of hypercalcemia: Constipation, fatigue. Denies any polyuria, polydipsia, stomach pain, kidney stones, bone fractures, episodes of confusion. Lithium/ HCTZ: Patient is taking lisinopril-hydrochlorothiazide 20-12.5 mg daily for hypertension, he has been on this for the past 7 years or more    Calcium supplement: No calcium tablets, no Tums, no significant dairy intake    Vit D supplement: He was on vitamin D 50,000 units once a week which was stopped a few months back    Osteoporosis/fragility fracture: None    Dental issues: No current dental issues    Family history of hypercalcemia/hyperparathyroidism/osteoporosis: None to his knowledge    Past Medical History:   Diagnosis Date    CAD (coronary artery disease)     Diabetes ([facility]     Hypertension      Past Surgical History:   Procedure Laterality Date    HX CATARACT REMOVAL Bilateral       Family History   Problem Relation Age of Onset    Diabetes Mother      Social History     Tobacco Use    Smoking status: Never Smoker    Smokeless tobacco: Never Used   Substance Use Topics    Alcohol use: Never      Prior to Admission medications    Medication Sig Start Date End Date Taking?  Authorizing Provider gabapentin (NEURONTIN) 300 mg capsule  9/16/21  Yes Provider, Historical   cholecalciferol (VITAMIN D3) (2,000 UNITS /50 MCG) cap capsule Take 1 Capsule by mouth two (2) times a day for 30 days. 9/27/21 10/27/21 Yes Silvia Brittle, MD   lisinopriL (PRINIVIL, ZESTRIL) 40 mg tablet Take 1 Tablet by mouth daily for 90 days. 9/27/21 12/26/21 Yes Silvia Brittle, MD   Bydureon BCise 2 mg/0.85 mL atIn  8/27/21  Yes Provider, Historical   traMADoL (ULTRAM) 50 mg tablet  6/10/21  Yes Provider, Historical   aspirin 81 mg chewable tablet Take 81 mg by mouth daily. Yes Other, MD Clinton   nebivoloL (Bystolic) 20 mg tablet Take 20 mg by mouth daily. Yes Other, MD Clinton   amLODIPine (NORVASC) 10 mg tablet Take  by mouth daily. Yes Other, MD Clinton   SITagliptin-metFORMIN (Janumet) 50-1,000 mg per tablet Take 1 Tab by mouth two (2) times daily (with meals). Yes Other, MD Clinton   potassium chloride SR (KLOR-CON 10) 10 mEq tablet Take 10 mEq by mouth daily. Yes Other, MD Clinton   dapagliflozin (Farxiga) 10 mg tab tablet Take 10 mg by mouth daily. Yes Other, MD Clinton        Allergies   Allergen Reactions    Cortisone Anaphylaxis       Review of Systems:  ROS    A comprehensive review of systems was preformed and it is negative except mentioned in HPI    Objective:     Vitals:    09/27/21 1045 09/27/21 1052   BP: (!) 160/89 (!) 159/92   Pulse: 86 75   Temp: 97.7 °F (36.5 °C)    TempSrc: Temporal    SpO2: 98%    Weight: 219 lb (99.3 kg)    Height: 5' 5\" (1.651 m)         Physical Exam:    Physical Exam  Vitals and nursing note reviewed. Constitutional:       Appearance: Normal appearance. HENT:      Head: Normocephalic and atraumatic. Cardiovascular:      Rate and Rhythm: Normal rate and regular rhythm. Pulmonary:      Effort: Pulmonary effort is normal.      Breath sounds: Normal breath sounds. Neurological:      Mental Status: He is alert.           Labs and Imaging:    Last 3 Recorded Weights in this Encounter 09/27/21 1045   Weight: 219 lb (99.3 kg)        No results found for: HBA1C, TNL6XPIA, HGV2NYQC, KIE0LDAJ     Assessment:     Patient Active Problem List   Diagnosis Code    Hypercalcemia E83.52    Benign essential HTN I10    Type 2 diabetes mellitus with hyperglycemia, without long-term current use of insulin ([facility] E11.65    Mixed hyperlipidemia E78.2           Plan:     Hypercalcemia:  :  Calcium slightly elevated at 10.8 (8.6-10.2)  25 hydroxy vitamin D low at 15.1    11-:  Ionized calcium borderline elevated at 5.7 (4.5-5.6)  Magnesium lower end of normal range at 1.6  Phosphorus lower end of normal range at 3 (2.8-4.1)  25 hydroxy vitamin D low at 17.8    1-:  Calcium elevated at 11.1 (8.6-10.2)    3-:  Normal hemoglobin and hematocrit    5-:  Calcium elevated at 10.7 (8.6-10.2)    5-:  25 hydroxy vitamin D low at 16  Ionized calcium slightly elevated at 5.8 (4.5-5.6)  PTH normal at 46 (15-65)  Renal function normal.    Patient is on lisinopril-hydrochlorothiazide 20-12.5 mg daily    Switched to plain lisinopril    9-:  Calcium borderline elevated at 10.5 (8.6-10.2)  Phosphorus normal at 3  Magnesium normal at 1.8  PTH elevated at 67 (15-65)  25 hydroxy vitamin D low at 18.5  1, 25 dihydroxy vitamin D normal at 46.7 (19.9-79. 3)  Plan:  It appears that patient has primary hyper parathyroidism. Currently calcium elevation is mild, may be because of vitamin D deficiency. Optimize vitamin D replacement and check labs including 24-hour urine calcium and bone density scan prior to next visit in 2 months to see if patient meets criteria for surgery. Vitamin D deficiency:  25 hydroxy vitamin D low at 12 on 5-  Agree with avoiding high-dose vitamin D replacement  Start gentle replacement with cholecalciferol 2000 units daily    9-:  25 hydroxy vitamin D low at 18.5  Plan:  Increase vitamin D to 2000 units twice a day.   Check vitamin D level prior to next visit. Mixed hyperlipidemia:  He was on rosuvastatin. Not sure why it was stopped. There is no contraindication to being on rosuvastatin from endocrine standpoint because of hypercalcemia. He will benefit from going back on rosuvastatin given history of diabetes and coronary artery disease. Essential hypertension:  Blood pressure elevated today as patient did not take blood pressure medication this morning. I will reevaluate at next visit. Continue to stay off hydrochlorothiazide. Orders Placed This Encounter    DEXA BONE DENSITY STUDY AXIAL     Standing Status:   Future     Standing Expiration Date:   10/27/2022    CALCIUM, UR, 24 HR    CREATININE, UR, 24 HR     Order Specific Question:   Has the patient fasted? Answer:   No     Order Specific Question:   Patient Height     Answer:   5     Order Specific Question:   Patient Weight     Answer:   517    METABOLIC PANEL, COMPREHENSIVE     Standing Status:   Future     Standing Expiration Date:   3/27/2022    VITAMIN D, 25 HYDROXY     Standing Status:   Future     Standing Expiration Date:   3/27/2022    PTH INTACT     Standing Status:   Future     Standing Expiration Date:   3/27/2022    PHOSPHORUS     Standing Status:   Future     Standing Expiration Date:   3/27/2022    cholecalciferol (VITAMIN D3) (2,000 UNITS /50 MCG) cap capsule     Sig: Take 1 Capsule by mouth two (2) times a day for 30 days. Dispense:  60 Capsule     Refill:  3    lisinopriL (PRINIVIL, ZESTRIL) 40 mg tablet     Sig: Take 1 Tablet by mouth daily for 90 days.      Dispense:  90 Tablet     Refill:  1     DC lisinopril-HCTZ        Signed By: Shelby Alves MD     September 27, 2021      Return to clinic 2 months

## 2021-09-27 NOTE — LETTER
9/27/2021    Patient: Carson Solorzaon   YOB: 1955   Date of Visit: 9/27/2021     [facility] Melo [facility] [address] [address]  Via Fax: [phone]    Dear TANVI Melo,      Thank you for referring Mr. Prashanth Gamble to [address] [place] [address] for evaluation. My notes for this consultation are attached. If you have questions, please do not hesitate to call me. I look forward to following your patient along with you.       Sincerely,    Lamine Luz MD

## 2021-10-07 ENCOUNTER — HOSPITAL ENCOUNTER (OUTPATIENT)
Dept: MAMMOGRAPHY | Age: 66
Discharge: HOME | End: 2021-10-07
Attending: INTERNAL MEDICINE
Payer: COMMERCIAL

## 2021-10-07 DIAGNOSIS — E21.0 PRIMARY HYPERPARATHYROIDISM (HCC): ICD-10-CM

## 2021-10-07 PROCEDURE — 77080 DXA BONE DENSITY AXIAL: CPT

## 2021-11-24 LAB
25(OH)D3+25(OH)D2 SERPL-MCNC: 33.3 NG/ML (ref 30–100)
ALBUMIN SERPL-MCNC: 4.2 G/DL (ref 3.8–4.8)
ALBUMIN/GLOB SERPL: 1.2 {RATIO} (ref 1.2–2.2)
ALP SERPL-CCNC: 89 IU/L (ref 44–121)
ALT SERPL-CCNC: 29 IU/L (ref 0–44)
AST SERPL-CCNC: 31 IU/L (ref 0–40)
BILIRUB SERPL-MCNC: 0.7 MG/DL (ref 0–1.2)
BUN SERPL-MCNC: 11 MG/DL (ref 8–27)
BUN/CREAT SERPL: 10 (ref 10–24)
CALCIUM 24H UR-MCNC: 15.5 MG/DL
CALCIUM 24H UR-MRATE: 186 MG/24 HR (ref 0–320)
CALCIUM SERPL-MCNC: 10.4 MG/DL (ref 8.6–10.2)
CHLORIDE SERPL-SCNC: 106 MMOL/L (ref 96–106)
CO2 SERPL-SCNC: 26 MMOL/L (ref 20–29)
CREAT 24H UR-MRATE: 1440 MG/24 HR (ref 1000–2000)
CREAT SERPL-MCNC: 1.09 MG/DL (ref 0.76–1.27)
CREAT UR-MCNC: 120 MG/DL
GLOBULIN SER CALC-MCNC: 3.4 G/DL (ref 1.5–4.5)
GLUCOSE SERPL-MCNC: 186 MG/DL (ref 65–99)
PHOSPHATE SERPL-MCNC: 3.5 MG/DL (ref 2.8–4.1)
POTASSIUM SERPL-SCNC: 3.7 MMOL/L (ref 3.5–5.2)
PROT SERPL-MCNC: 7.6 G/DL (ref 6–8.5)
PTH-INTACT SERPL-MCNC: 61 PG/ML (ref 15–65)
SODIUM SERPL-SCNC: 145 MMOL/L (ref 134–144)

## 2021-11-27 DIAGNOSIS — E21.0 PRIMARY HYPERPARATHYROIDISM (HCC): ICD-10-CM

## 2021-11-30 ENCOUNTER — OFFICE VISIT (OUTPATIENT)
Dept: ENDOCRINOLOGY | Age: 66
End: 2021-11-30
Payer: COMMERCIAL

## 2021-11-30 VITALS
DIASTOLIC BLOOD PRESSURE: 86 MMHG | SYSTOLIC BLOOD PRESSURE: 148 MMHG | HEART RATE: 89 BPM | TEMPERATURE: 97.5 F | BODY MASS INDEX: 36.12 KG/M2 | HEIGHT: 65 IN | WEIGHT: 216.8 LBS | OXYGEN SATURATION: 99 %

## 2021-11-30 DIAGNOSIS — E55.9 VITAMIN D DEFICIENCY: ICD-10-CM

## 2021-11-30 DIAGNOSIS — E21.0 PRIMARY HYPERPARATHYROIDISM (HCC): Primary | ICD-10-CM

## 2021-11-30 PROCEDURE — 99214 OFFICE O/P EST MOD 30 MIN: CPT | Performed by: INTERNAL MEDICINE

## 2021-11-30 RX ORDER — ACETAMINOPHEN 500 MG
2000 TABLET ORAL DAILY
Qty: 30 CAPSULE | Refills: 4 | Status: SHIPPED | OUTPATIENT
Start: 2021-11-30 | End: 2021-12-30

## 2021-11-30 NOTE — PROGRESS NOTES
History and Physical    Patient: Susy Dennis MRN: [id number]  SSN: [id number]    YOB: 1955  Age: 61 y.o. Sex: male      Subjective:      Susy Dennis is a 61 y.o. male with past medical history of type 2 diabetes mellitus, hypertension, coronary artery disease, hyperlipidemia is here for follow-up of hypercalcemia. He was sent to me by primary care provider Nahomy Peña NP     at the initial visit we stopped lisinopril-hydrochlorothiazide and switched him to just lisinopril 40 mg daily. Labs done after that were inconclusive because of vitamin D deficiency. So at the last visit vitamin D was increased from 2000 units daily to 2000 units twice a day and patient had labs including 24-hour urine calcium as well as bone density scan done prior to this visit. Onset: 1 year back    Symptoms of hypercalcemia: Constipation, fatigue. Denies any polyuria, polydipsia, stomach pain, kidney stones, bone fractures, episodes of confusion.     Lithium/ HCTZ: Patient is taking lisinopril-hydrochlorothiazide 20-12.5 mg daily for hypertension, he has been on this for the past 7 years or more    Calcium supplement: No calcium tablets, no Tums, no significant dairy intake    Vit D supplement: He was on vitamin D 50,000 units once a week which was stopped a few months back    Osteoporosis/fragility fracture: None    Dental issues: No current dental issues    Family history of hypercalcemia/hyperparathyroidism/osteoporosis: None to his knowledge    Past Medical History:   Diagnosis Date    CAD (coronary artery disease)     Diabetes ([facility]     Hypertension      Past Surgical History:   Procedure Laterality Date    HX CATARACT REMOVAL Bilateral       Family History   Problem Relation Age of Onset    Diabetes Mother      Social History     Tobacco Use    Smoking status: Never Smoker    Smokeless tobacco: Never Used   Substance Use Topics    Alcohol use: Never      Prior to Admission medications    Medication Sig Start Date End Date Taking? Authorizing Provider   cholecalciferol (VITAMIN D3) (2,000 UNITS /50 MCG) cap capsule Take 1 Capsule by mouth daily for 30 days. 11/30/21 12/30/21 Yes Danniel Leventhal, MD   gabapentin (NEURONTIN) 300 mg capsule  9/16/21  Yes Provider, Historical   lisinopriL (PRINIVIL, ZESTRIL) 40 mg tablet Take 1 Tablet by mouth daily for 90 days. 9/27/21 12/26/21 Yes Danniel Leventhal, MD   Bydureon BCise 2 mg/0.85 mL atIn  8/27/21  Yes Provider, Historical   traMADoL (ULTRAM) 50 mg tablet  6/10/21  Yes Provider, Historical   aspirin 81 mg chewable tablet Take 81 mg by mouth daily. Yes Other, MD Clinton   nebivoloL (Bystolic) 20 mg tablet Take 20 mg by mouth daily. Yes Other, MD Clinton   amLODIPine (NORVASC) 10 mg tablet Take  by mouth daily. Yes Other, MD Clinton   SITagliptin-metFORMIN (Janumet) 50-1,000 mg per tablet Take 1 Tab by mouth two (2) times daily (with meals). Yes Other, MD Clinton   potassium chloride SR (KLOR-CON 10) 10 mEq tablet Take 10 mEq by mouth daily. Yes Other, MD Clinton   dapagliflozin (Farxiga) 10 mg tab tablet Take 10 mg by mouth daily. Yes Other, MD Clinton        Allergies   Allergen Reactions    Cortisone Anaphylaxis       Review of Systems:  ROS    A comprehensive review of systems was preformed and it is negative except mentioned in HPI    Objective:     Vitals:    11/30/21 1128 11/30/21 1133   BP: (!) 159/91 (!) 148/86   Pulse: 93 89   Temp: 97.5 °F (36.4 °C)    TempSrc: Temporal    SpO2: 99%    Weight: 216 lb 12.8 oz (98.3 kg)    Height: 5' 5\" (1.651 m)         Physical Exam:    Physical Exam  Vitals and nursing note reviewed. Constitutional:       Appearance: Normal appearance. HENT:      Head: Normocephalic and atraumatic. Cardiovascular:      Rate and Rhythm: Normal rate and regular rhythm. Pulmonary:      Effort: Pulmonary effort is normal.      Breath sounds: Normal breath sounds. Neurological:      Mental Status: He is alert. Labs and Imaging:    Last 3 Recorded Weights in this Encounter    11/30/21 1128   Weight: 216 lb 12.8 oz (98.3 kg)        No results found for: HBA1C, QKR9ILMK, JNU9WICA, YSL7FLOY     Assessment:     Patient Active Problem List   Diagnosis Code    Hypercalcemia E83.52    Benign essential HTN I10    Type 2 diabetes mellitus with hyperglycemia, without long-term current use of insulin ([facility] E11.65    Mixed hyperlipidemia E78.2           Plan:     Hypercalcemia/primary hyperparathyroidism:  :  Calcium slightly elevated at 10.8 (8.6-10.2)  25 hydroxy vitamin D low at 15.1    11-:  Ionized calcium borderline elevated at 5.7 (4.5-5.6)  Magnesium lower end of normal range at 1.6  Phosphorus lower end of normal range at 3 (2.8-4.1)  25 hydroxy vitamin D low at 17.8    1-:  Calcium elevated at 11.1 (8.6-10.2)    3-:  Normal hemoglobin and hematocrit    5-:  Calcium elevated at 10.7 (8.6-10.2)    5-:  25 hydroxy vitamin D low at 16  Ionized calcium slightly elevated at 5.8 (4.5-5.6)  PTH normal at 46 (15-65)  Renal function normal.    Patient is on lisinopril-hydrochlorothiazide 20-12.5 mg daily    Switched to plain lisinopril    9-:  Calcium borderline elevated at 10.5 (8.6-10.2)  Phosphorus normal at 3  Magnesium normal at 1.8  PTH elevated at 67 (15-65)  25 hydroxy vitamin D low at 18.5  1, 25 dihydroxy vitamin D normal at 46.7 (19.9-79. 3)    It appears that patient has primary hyper parathyroidism. Currently calcium elevation is mild, may be because of vitamin D deficiency. Optimize vitamin D replacement with vitamin D 2000 units twice a day and repeated labs.     11-:  25 hydroxy vitamin D normal at 33.6  Calcium borderline elevated at 10.4 (8.6-10.2)  PTH at the upper end of normal range at 61  24-hour urine calcium normal at 186 (0-320)    10-7-2021 bone density scan:  Lumbar spine T score -2.8  Right femoral neck T score -2.3  Right hip total T score -1.5  Left femoral neck T score -1.4  Left hip total T score -0.5  Plan:  Primary hyperparathyroidism. Although calcium elevation is mild, patient has osteoporosis and he meets criteria for surgery. He will need treatment for osteoporosis after surgery. Referring patient to ENT. I will see him back in 2 months. Continue to stay off calcium tablets, Tums, hydrochlorothiazide. Osteoporosis:  10-7-2021 bone density scan:  Lumbar spine T score -2.8  Right femoral neck T score -2.3  Right hip total T score -1.5  Left femoral neck T score -1.4  Left hip total T score -0.5  Plan:  Consume 2 servings of dairy for bone health. Avoid calcium tablets, Tums. Avoid falls. Weightbearing exercises as tolerated. I will start him on antiresorptive medication after parathyroid surgery. Vitamin D deficiency:  25 hydroxy vitamin D low at 12 on 5-  Agree with avoiding high-dose vitamin D replacement  Start gentle replacement with cholecalciferol 2000 units daily    9-:  25 hydroxy vitamin D low at 18.5  Increase vitamin D to 2000 units twice a day. 11-:  25 hydroxy vitamin D normal at 33.3  Plan:  Decrease vitamin D to 2000 units daily from 2000 units twice a day. Mixed hyperlipidemia:  He was on rosuvastatin. Not sure why it was stopped. There is no contraindication to being on rosuvastatin from endocrine standpoint because of hypercalcemia. He will benefit from going back on rosuvastatin given history of diabetes and coronary artery disease. Essential hypertension:  Blood pressure elevated today but much better than before. Defer to PCP.     Orders Placed This Encounter    REFERRAL TO ENT-OTOLARYNGOLOGY     Referral Priority:   Routine     Referral Type:   Consultation     Referral Reason:   Specialty Services Required     Referred to Provider:   Carolin Ga MD     Requested Specialty:   Otolaryngology     Number of Visits Requested:   1    cholecalciferol (VITAMIN D3) (2,000 UNITS /50 MCG) cap capsule     Sig: Take 1 Capsule by mouth daily for 30 days.      Dispense:  30 Capsule     Refill:  4        Signed By: Delores Holder MD     November 30, 2021      Return to clinic 2 months

## 2021-11-30 NOTE — LETTER
11/30/2021    Patient: Kvng Brush   YOB: 1955   Date of Visit: 11/30/2021     [facility] Cunha [facility] [address]  Via Fax: [phone]    Dear TANVI Cunha,      Thank you for referring Mr. Subha Virk to [address] [place] [address] for evaluation. My notes for this consultation are attached. If you have questions, please do not hesitate to call me. I look forward to following your patient along with you.       Sincerely,    Latrell Laura MD

## 2022-01-06 ENCOUNTER — OFFICE VISIT (OUTPATIENT)
Dept: ENT CLINIC | Age: 67
End: 2022-01-06
Payer: COMMERCIAL

## 2022-01-06 VITALS
RESPIRATION RATE: 16 BRPM | HEIGHT: 65 IN | HEART RATE: 75 BPM | DIASTOLIC BLOOD PRESSURE: 86 MMHG | BODY MASS INDEX: 35.99 KG/M2 | TEMPERATURE: 97.4 F | WEIGHT: 216 LBS | SYSTOLIC BLOOD PRESSURE: 140 MMHG | OXYGEN SATURATION: 99 %

## 2022-01-06 DIAGNOSIS — E83.52 HYPERCALCEMIA: ICD-10-CM

## 2022-01-06 DIAGNOSIS — E21.3 HYPERPARATHYROIDISM (HCC): Primary | ICD-10-CM

## 2022-01-06 PROCEDURE — 99203 OFFICE O/P NEW LOW 30 MIN: CPT | Performed by: NURSE PRACTITIONER

## 2022-01-06 NOTE — PROGRESS NOTES
Otolaryngology-Head and Neck Surgery  New Patient Visit     Patient: Lizzy Pal  YOB: 1955  MRN: [id number]  Date of Service: 1/6/2022    Chief Complaint: Hyperparathyroidism    History of Present Illness: Lizzy Pal is a 61y.o. year old male who presents today accompanied by daughter for discussion of    Referred by Dr. Sherryle Spencer, endocrinology    Reports right hip pain for 2 years  Saw PCP In June and found to have elevated calcium and intermittent abnormal PTH  Has had no imaging    Denies difficulty swallowing, voice changes   +fatigue, muscle cramping    Per endo note: Onset: 1 year back     Symptoms of hypercalcemia: Constipation, fatigue. Denies any polyuria, polydipsia, stomach pain, kidney stones, bone fractures, episodes of confusion. :  Calcium slightly elevated at 10.8 (8.6-10.2)    11-:  Ionized calcium borderline elevated at 5.7 (4.5-5.6)     1-:  Calcium elevated at 11.1 (8.6-10. 2)     5-:  Calcium elevated at 10.7 (8.6-10. 2)     5-:  Ionized calcium slightly elevated at 5.8 (4.5-5.6)  PTH normal at 46 (15-65)  Renal function normal.    9-:  Calcium borderline elevated at 10.5 (8.6-10.2)  PTH elevated at 67 (15-65)   It appears that patient has primary hyper parathyroidism. Currently calcium elevation is mild, may be because of vitamin D deficiency. Optimize vitamin D replacement with vitamin D 2000 units twice a day and repeated labs.    11-:  Calcium borderline elevated at 10.4 (8.6-10.2)  PTH at the upper end of normal range at 61     10-7-2021 bone density scan:  Lumbar spine T score -2.8  Right femoral neck T score -2.3  Right hip total T score -1.5  Left femoral neck T score -1.4  Left hip total T score -0.5  Plan:  Primary hyperparathyroidism. Although calcium elevation is mild, patient has osteoporosis and he meets criteria for surgery. He will need treatment for osteoporosis after surgery.   Referring patient to ENT.  I will see him back in 2 months. Continue to stay off calcium tablets, Tums, hydrochlorothiazide.       Family Hx: mother with parathyroid    Past Medical History:  Past Medical History:   Diagnosis Date    CAD (coronary artery disease)     Diabetes ([facility]     Hypertension        Past Surgical History:   Past Surgical History:   Procedure Laterality Date    HX CATARACT REMOVAL Bilateral        Medications:   Current Outpatient Medications   Medication Instructions    amLODIPine (NORVASC) 10 mg tablet Oral, DAILY    aspirin 81 mg, Oral, DAILY    Bydureon BCise 2 mg/0.85 mL atIn No dose, route, or frequency recorded.  dapagliflozin (FARXIGA) 10 mg, Oral, DAILY    gabapentin (NEURONTIN) 300 mg capsule No dose, route, or frequency recorded.  nebivoloL (BYSTOLIC) 20 mg, Oral, DAILY    potassium chloride SR (KLOR-CON 10) 10 mEq tablet 10 mEq, DAILY    SITagliptin-metFORMIN (Janumet) 50-1,000 mg per tablet 1 Tablet, Oral, 2 TIMES DAILY WITH MEALS    traMADoL (ULTRAM) 50 mg tablet No dose, route, or frequency recorded. Allergies: Allergies   Allergen Reactions    Cortisone Anaphylaxis       Social History:   Social History     Tobacco Use    Smoking status: Never Smoker    Smokeless tobacco: Never Used   Vaping Use    Vaping Use: Never used   Substance Use Topics    Alcohol use: Never    Drug use: Never        Family History:  Family History   Problem Relation Age of Onset    Diabetes Mother        Review of Systems:    Consitutional: denies fever, excessive weight gain or loss. Eyes: denies diplopia, eye pain. Integumentary: denies new concerning skin lesions. Ears, Nose, Mouth, Throat: denies except as per HPI.   Endocrine: denies hot or cold intolerance, increased thirst.  Respiratory: denies cough, hemoptysis, wheezing  Gastrointestinal: denies trouble swallowing, nausea, emesis, regurgitation  Musculoskeletal: denies muscle weakness or wasting  Cardiovascular: denies chest pain, shortness of breath  Neurologic: denies seizures, numbness or tingling, syncope  Hematologic: denies easy bleeding or bruising    Physical Examination:   Vitals:    01/06/22 1300   BP: (!) 140/86   BP 1 Location: Left upper arm   BP Patient Position: Sitting   BP Cuff Size: Large adult   Pulse: 75   Temp: 97.4 °F (36.3 °C)   TempSrc: Temporal   Resp: 16   Height: 5' 5\" (1.651 m)   Weight: 216 lb (98 kg)   SpO2: 99%        General: Comfortable, pleasant, appears stated age  Voice: Strong, speaking in full sentences, no stridor    Face: No masses or lesions, facial strength symmetric   Ears: External ears unremarkable. Bilateral ear canal clear. Tympanic membrane clear and intact, with visible landmarks. Clear middle ear space  Nose: External nose unremarkable. Dorsum midline. Anterior rhinoscopy demonstrates no lesions. Septum midline. Turbinates without hypertrophy. Oral Cavity / Oropharynx: No trismus. Mucosa pink and moist. No lesions. Tongue is midline and mobile. Palate elevates symmetrically. Uvula midline. Tonsils unremarkable. Base of tongue soft. Floor of mouth soft. Neck: Supple. No adenopathy. Thyroid unremarkable. Palpable laryngeal landmarks. Full neck range of motion   Neurologic: CN II - XI intact. Normal gait      Assessment and Plan:   1. Hyperparathyroidism   2. Hypercalcemia    -Discussed hyperparathyroidism etiology and treatment. Handout given. Discussed past lab results and importance related to diagnosis. -Will get thyroid/parathyroid scan and nuclear scan.  -Patient anxious about treatment and/or surgery.  -Will have patient follow up with Dr. Shahana Dixon after imaging.                Cheyanne Santana MSN, FNP-C  [facility]  [address] [place] [address]  Office Phone: [phone]

## 2022-01-06 NOTE — PROGRESS NOTES
Visit Vitals  BP (!) 140/86 (BP 1 Location: Left upper arm, BP Patient Position: Sitting, BP Cuff Size: Large adult)   Pulse 75   Temp 97.4 °F (36.3 °C) (Temporal)   Resp 16   Ht 5' 5\" (1.651 m)   Wt 216 lb (98 kg)   SpO2 99%   BMI 35.94 kg/m²     Chief Complaint   Patient presents with    New Patient     Referred here by Dr Robin Maria for Hyperparathyroidism   1. Have you been to the ER, urgent care clinic since your last visit? Hospitalized since your last visit? No    2. Have you seen or consulted any other health care providers outside of the [address] [place] [address] since your last visit? Include any pap smears or colon screening.  No

## 2022-01-07 DIAGNOSIS — E21.0 PRIMARY HYPERPARATHYROIDISM (HCC): Primary | ICD-10-CM

## 2022-01-20 ENCOUNTER — TELEPHONE (OUTPATIENT)
Dept: ENDOCRINOLOGY | Age: 67
End: 2022-01-20

## 2022-01-20 DIAGNOSIS — I10 BENIGN ESSENTIAL HTN: ICD-10-CM

## 2022-01-20 RX ORDER — LISINOPRIL 40 MG/1
40 TABLET ORAL DAILY
Qty: 90 TABLET | Refills: 2 | Status: SHIPPED | OUTPATIENT
Start: 2022-01-20 | End: 2022-04-20

## 2022-01-20 NOTE — TELEPHONE ENCOUNTER
Patient is calling for a refill on lisinopril after he contacted his pharmacy they stated he needed to contact you.

## 2022-01-21 ENCOUNTER — HOSPITAL ENCOUNTER (OUTPATIENT)
Dept: NUCLEAR MEDICINE | Age: 67
Discharge: HOME | End: 2022-01-21
Attending: OTOLARYNGOLOGY
Payer: COMMERCIAL

## 2022-01-21 ENCOUNTER — HOSPITAL ENCOUNTER (OUTPATIENT)
Dept: ULTRASOUND IMAGING | Age: 67
Discharge: HOME | End: 2022-01-21
Attending: NURSE PRACTITIONER
Payer: COMMERCIAL

## 2022-01-21 DIAGNOSIS — E21.3 HYPERPARATHYROIDISM (HCC): ICD-10-CM

## 2022-01-21 DIAGNOSIS — E83.52 HYPERCALCEMIA: ICD-10-CM

## 2022-01-21 DIAGNOSIS — E21.0 PRIMARY HYPERPARATHYROIDISM (HCC): ICD-10-CM

## 2022-01-21 PROCEDURE — 76536 US EXAM OF HEAD AND NECK: CPT

## 2022-01-21 PROCEDURE — 78070 PARATHYROID PLANAR IMAGING: CPT

## 2022-01-21 RX ORDER — TETRAKIS(2-METHOXYISOBUTYLISOCYANIDE)COPPER(I) TETRAFLUOROBORATE 1 MG/ML
20.4 INJECTION, POWDER, LYOPHILIZED, FOR SOLUTION INTRAVENOUS
Status: COMPLETED | OUTPATIENT
Start: 2022-01-21 | End: 2022-01-21

## 2022-01-21 RX ADMIN — TETRAKIS(2-METHOXYISOBUTYLISOCYANIDE)COPPER(I) TETRAFLUOROBORATE 20.4 MILLICURIE: 1 INJECTION, POWDER, LYOPHILIZED, FOR SOLUTION INTRAVENOUS at 10:22

## 2022-01-28 ENCOUNTER — OFFICE VISIT (OUTPATIENT)
Dept: ENT CLINIC | Age: 67
End: 2022-01-28
Payer: COMMERCIAL

## 2022-01-28 VITALS
BODY MASS INDEX: 35.99 KG/M2 | OXYGEN SATURATION: 98 % | DIASTOLIC BLOOD PRESSURE: 80 MMHG | WEIGHT: 216 LBS | HEIGHT: 65 IN | RESPIRATION RATE: 17 BRPM | SYSTOLIC BLOOD PRESSURE: 150 MMHG | HEART RATE: 78 BPM | TEMPERATURE: 98.1 F

## 2022-01-28 DIAGNOSIS — E83.52 HYPERCALCEMIA: ICD-10-CM

## 2022-01-28 DIAGNOSIS — E21.3 HYPERPARATHYROIDISM (HCC): Primary | ICD-10-CM

## 2022-01-28 PROCEDURE — 99214 OFFICE O/P EST MOD 30 MIN: CPT | Performed by: OTOLARYNGOLOGY

## 2022-01-28 NOTE — LETTER
1/28/2022    Patient: Bradley Amezquita   YOB: 1955   Date of Visit: 1/28/2022     [facility] De La Cruz [address] [place] [address]  Via Fax: [phone]    Dear TANVI De La Cruz,      Thank you for referring Mr. Jacob Castañeda to [facility] [address] for evaluation. My notes for this consultation are attached. If you have questions, please do not hesitate to call me. I look forward to following your patient along with you.       Sincerely,    Cole White MD

## 2022-01-28 NOTE — PROGRESS NOTES
Otolaryngology-Head and Neck Surgery  New Patient Visit     Patient: Bradley Amezquita  YOB: 1955  MRN: [id number]  Date of Service: 1/28/2022    Chief Complaint: Hyperparathyroidism    History of Present Illness: Bradley Amezquita is a 61y.o. year old male who presents today accompanied by daughter for discussion of    Referred by Dr. Juan Duran, endocrinology    Reports right hip pain for 2 years  Saw PCP In June and found to have elevated calcium and intermittent abnormal PTH  Has had no imaging    Denies difficulty swallowing, voice changes   +fatigue, muscle cramping    Per endo note: Onset: 1 year back     Symptoms of hypercalcemia: Constipation, fatigue. Denies any polyuria, polydipsia, stomach pain, kidney stones, bone fractures, episodes of confusion. :  Calcium slightly elevated at 10.8 (8.6-10.2)    11-:  Ionized calcium borderline elevated at 5.7 (4.5-5.6)     1-:  Calcium elevated at 11.1 (8.6-10. 2)     5-:  Calcium elevated at 10.7 (8.6-10. 2)     5-:  Ionized calcium slightly elevated at 5.8 (4.5-5.6)  PTH normal at 46 (15-65)  Renal function normal.    9-:  Calcium borderline elevated at 10.5 (8.6-10.2)  PTH elevated at 67 (15-65)   It appears that patient has primary hyper parathyroidism. Currently calcium elevation is mild, may be because of vitamin D deficiency. Optimize vitamin D replacement with vitamin D 2000 units twice a day and repeated labs.    11-:  Calcium borderline elevated at 10.4 (8.6-10.2)  PTH at the upper end of normal range at 61     10-7-2021 bone density scan:  Lumbar spine T score -2.8  Right femoral neck T score -2.3  Right hip total T score -1.5  Left femoral neck T score -1.4  Left hip total T score -0.5  Plan:  Primary hyperparathyroidism. Although calcium elevation is mild, patient has osteoporosis and he meets criteria for surgery. He will need treatment for osteoporosis after surgery.   Referring patient to ENT.  I will see him back in 2 months. Continue to stay off calcium tablets, Tums, hydrochlorothiazide.       Family Hx: mother with parathyroid    1/28/22 - pt follows up today to discuss results of US and PTH scan      Past Medical History:  Past Medical History:   Diagnosis Date    CAD (coronary artery disease)     Diabetes ([facility] [address]     Hypertension        Past Surgical History:   Past Surgical History:   Procedure Laterality Date    HX CATARACT REMOVAL Bilateral        Medications:   Current Outpatient Medications   Medication Instructions    amLODIPine (NORVASC) 10 mg tablet Oral, DAILY    aspirin 81 mg, Oral, DAILY    Bydureon BCise 2 mg/0.85 mL atIn No dose, route, or frequency recorded.  dapagliflozin (FARXIGA) 10 mg, Oral, DAILY    gabapentin (NEURONTIN) 300 mg capsule No dose, route, or frequency recorded.  lisinopriL (PRINIVIL, ZESTRIL) 40 mg, Oral, DAILY    nebivoloL (BYSTOLIC) 20 mg, Oral, DAILY    potassium chloride SR (KLOR-CON 10) 10 mEq tablet 10 mEq, DAILY    SITagliptin-metFORMIN (Janumet) 50-1,000 mg per tablet 1 Tablet, Oral, 2 TIMES DAILY WITH MEALS    traMADoL (ULTRAM) 50 mg tablet No dose, route, or frequency recorded. Allergies: Allergies   Allergen Reactions    Cortisone Anaphylaxis       Social History:   Social History     Tobacco Use    Smoking status: Never Smoker    Smokeless tobacco: Never Used   Vaping Use    Vaping Use: Never used   Substance Use Topics    Alcohol use: Never    Drug use: Never        Family History:  Family History   Problem Relation Age of Onset    Diabetes Mother        Review of Systems:    Consitutional: denies fever, excessive weight gain or loss. Eyes: denies diplopia, eye pain. Integumentary: denies new concerning skin lesions. Ears, Nose, Mouth, Throat: denies except as per HPI.   Endocrine: denies hot or cold intolerance, increased thirst.  Respiratory: denies cough, hemoptysis, wheezing  Gastrointestinal: denies trouble swallowing, nausea, emesis, regurgitation  Musculoskeletal: denies muscle weakness or wasting  Cardiovascular: denies chest pain, shortness of breath  Neurologic: denies seizures, numbness or tingling, syncope  Hematologic: denies easy bleeding or bruising    Physical Examination:   Vitals:    01/28/22 0956   BP: (!) 150/80   BP 1 Location: Left upper arm   BP Patient Position: Sitting   BP Cuff Size: Adult   Pulse: 78   Temp: 98.1 °F (36.7 °C)   TempSrc: Temporal   Resp: 17   Height: 5' 5\" (1.651 m)   Weight: 216 lb (98 kg)   SpO2: 98%        General: Comfortable, pleasant, appears stated age  Voice: Strong, speaking in full sentences, no stridor    Face: No masses or lesions, facial strength symmetric   Ears: External ears unremarkable. Bilateral ear canal clear. Tympanic membrane clear and intact, with visible landmarks. Clear middle ear space  Nose: External nose unremarkable. Dorsum midline. Anterior rhinoscopy demonstrates no lesions. Septum midline. Turbinates without hypertrophy. Oral Cavity / Oropharynx: No trismus. Mucosa pink and moist. No lesions. Tongue is midline and mobile. Palate elevates symmetrically. Uvula midline. Tonsils unremarkable. Base of tongue soft. Floor of mouth soft. Neck: Supple. No adenopathy. Thyroid unremarkable. Palpable laryngeal landmarks. Full neck range of motion   Neurologic: CN II - XI intact. Normal gait        IMPRESSION: 2.9 cm hypoechoic nodule posterior to the right thyroid gland is  compatible with parathyroid adenoma. Small left thyroid nodule. Assessment and Plan:   1. Hyperparathyroidism   2. Hypercalcemia  3. Osteoporosis    We reviewed his scans, labs. He is a good candidate for parathyroidectomy. Risks/benefits parathyroidectomy discussed with patient including bleeding, infection, recurrence, hypocalcemia, RLN injury. Questions answered.

## 2022-01-28 NOTE — PROGRESS NOTES
Visit Vitals  BP (!) 150/80 (BP 1 Location: Left upper arm, BP Patient Position: Sitting, BP Cuff Size: Adult)   Pulse 78   Temp 98.1 °F (36.7 °C) (Temporal)   Resp 17   Ht 5' 5\" (1.651 m)   Wt 216 lb (98 kg)   SpO2 98%   BMI 35.94 kg/m²     Chief Complaint   Patient presents with    Follow-up     U/S results

## 2022-01-31 ENCOUNTER — TELEPHONE (OUTPATIENT)
Dept: ENT CLINIC | Age: 67
End: 2022-01-31

## 2022-02-03 ENCOUNTER — OFFICE VISIT (OUTPATIENT)
Dept: ENDOCRINOLOGY | Age: 67
End: 2022-02-03
Payer: MEDICARE

## 2022-02-03 ENCOUNTER — TELEPHONE (OUTPATIENT)
Dept: ENT CLINIC | Age: 67
End: 2022-02-03

## 2022-02-03 VITALS
WEIGHT: 218 LBS | HEIGHT: 65 IN | TEMPERATURE: 98 F | BODY MASS INDEX: 36.32 KG/M2 | DIASTOLIC BLOOD PRESSURE: 88 MMHG | OXYGEN SATURATION: 99 % | HEART RATE: 72 BPM | SYSTOLIC BLOOD PRESSURE: 149 MMHG

## 2022-02-03 DIAGNOSIS — E21.0 PRIMARY HYPERPARATHYROIDISM (HCC): Primary | ICD-10-CM

## 2022-02-03 PROCEDURE — 99214 OFFICE O/P EST MOD 30 MIN: CPT | Performed by: INTERNAL MEDICINE

## 2022-02-03 NOTE — LETTER
2/3/2022    Patient: Anne Oneal   YOB: 1955   Date of Visit: 2/3/2022     Milan Virk [address] [place] [id number]  Via Fax: [phone]    Dear TANVI Virk,      Thank you for referring Mr. Rebecca John to [address] [place] [address] for evaluation. My notes for this consultation are attached. If you have questions, please do not hesitate to call me. I look forward to following your patient along with you.       Sincerely,    Benita Alvarado MD

## 2022-02-03 NOTE — PROGRESS NOTES
History and Physical    Patient: Hunter Lebron MRN: [id number]  SSN: [id number]    YOB: 1955  Age: 61 y.o. Sex: male      Subjective:      Hunter Lebron is a 61 y.o. male with past medical history of type 2 diabetes mellitus, hypertension, coronary artery disease, hyperlipidemia is here for follow-up of hypercalcemia. He was sent to me by primary care provider Pili Timmons NP     at the initial visit we stopped lisinopril-hydrochlorothiazide and switched him to just lisinopril 40 mg daily. After the initial biochemical evaluation it was determined that patient has primary hyperparathyroidism and he meets criteria for surgery because of osteoporosis. He was referred to ENT surgery. He had parathyroid scan done and he is tentatively scheduled for surgery for 3- (although chart shows that he is scheduled to get surgery on 2-). Vitamin D was decreased from 2000 units twice a day to 2000 units once a day. Patient is trying to keep himself well-hydrated, consuming 2 servings of dairy every day for bone health and staying away from calcium tablets. Onset: 1 year back    Symptoms of hypercalcemia: Constipation, fatigue. Denies any polyuria, polydipsia, stomach pain, kidney stones, bone fractures, episodes of confusion.     Lithium/ HCTZ: Patient is taking lisinopril-hydrochlorothiazide 20-12.5 mg daily for hypertension, he has been on this for the past 7 years or more    Calcium supplement: No calcium tablets, no Tums, no significant dairy intake    Vit D supplement: He was on vitamin D 50,000 units once a week which was stopped a few months back    Osteoporosis/fragility fracture: None    Dental issues: No current dental issues    Family history of hypercalcemia/hyperparathyroidism/osteoporosis: None to his knowledge    Past Medical History:   Diagnosis Date    CAD (coronary artery disease)     Diabetes ([facility] [address]     Hypertension      Past Surgical History:   Procedure Laterality Date    HX CATARACT REMOVAL Bilateral       Family History   Problem Relation Age of Onset    Diabetes Mother      Social History     Tobacco Use    Smoking status: Never Smoker    Smokeless tobacco: Never Used   Substance Use Topics    Alcohol use: Never      Prior to Admission medications    Medication Sig Start Date End Date Taking? Authorizing Provider   lisinopriL (PRINIVIL, ZESTRIL) 40 mg tablet Take 1 Tablet by mouth daily for 90 days. 1/20/22 4/20/22 Yes Blair Dumas MD   gabapentin (NEURONTIN) 300 mg capsule  9/16/21  Yes Provider, Historical   Bydureon BCise 2 mg/0.85 mL atIn  8/27/21  Yes Provider, Historical   traMADoL (ULTRAM) 50 mg tablet  6/10/21  Yes Provider, Historical   aspirin 81 mg chewable tablet Take 81 mg by mouth daily. Yes Other, MD Clinton   nebivoloL (Bystolic) 20 mg tablet Take 20 mg by mouth daily. Yes Other, MD Clinton   amLODIPine (NORVASC) 10 mg tablet Take  by mouth daily. Yes Other, MD Clinton   SITagliptin-metFORMIN (Janumet) 50-1,000 mg per tablet Take 1 Tab by mouth two (2) times daily (with meals). Yes Other, MD Clinton   dapagliflozin (Farxiga) 10 mg tab tablet Take 10 mg by mouth daily. Yes Other, MD Clinton   potassium chloride SR (KLOR-CON 10) 10 mEq tablet Take 10 mEq by mouth daily. Patient not taking: Reported on 1/6/2022    Other, MD Clinton        Allergies   Allergen Reactions    Cortisone Anaphylaxis       Review of Systems:  ROS    A comprehensive review of systems was preformed and it is negative except mentioned in HPI    Objective:     Vitals:    02/03/22 1241 02/03/22 1245   BP: (!) 165/83 (!) 149/88   Pulse: 72 72   Temp: 98 °F (36.7 °C)    SpO2: 99%    Weight: 218 lb (98.9 kg)    Height: 5' 5\" (1.651 m)         Physical Exam:    Physical Exam  Vitals and nursing note reviewed. Constitutional:       Appearance: Normal appearance. HENT:      Head: Normocephalic and atraumatic.    Cardiovascular:      Rate and Rhythm: Normal rate and regular rhythm. Pulmonary:      Effort: Pulmonary effort is normal.      Breath sounds: Normal breath sounds. Neurological:      Mental Status: He is alert. Labs and Imaging:    Last 3 Recorded Weights in this Encounter    02/03/22 1241   Weight: 218 lb (98.9 kg)        No results found for: HBA1C, KOD9TZCV, QRA9UMLZ, BID3EFQL     Assessment:     Patient Active Problem List   Diagnosis Code    Hypercalcemia E83.52    Benign essential HTN I10    Type 2 diabetes mellitus with hyperglycemia, without long-term current use of insulin ([facility] E11.65    Mixed hyperlipidemia E78.2           Plan:     Hypercalcemia/primary hyperparathyroidism:  :  Calcium slightly elevated at 10.8 (8.6-10.2)  25 hydroxy vitamin D low at 15.1    11-:  Ionized calcium borderline elevated at 5.7 (4.5-5.6)  Magnesium lower end of normal range at 1.6  Phosphorus lower end of normal range at 3 (2.8-4.1)  25 hydroxy vitamin D low at 17.8    1-:  Calcium elevated at 11.1 (8.6-10.2)    3-:  Normal hemoglobin and hematocrit    5-:  Calcium elevated at 10.7 (8.6-10.2)    5-:  25 hydroxy vitamin D low at 16  Ionized calcium slightly elevated at 5.8 (4.5-5.6)  PTH normal at 46 (15-65)  Renal function normal.    Patient is on lisinopril-hydrochlorothiazide 20-12.5 mg daily    Switched to plain lisinopril    9-:  Calcium borderline elevated at 10.5 (8.6-10.2)  Phosphorus normal at 3  Magnesium normal at 1.8  PTH elevated at 67 (15-65)  25 hydroxy vitamin D low at 18.5  1, 25 dihydroxy vitamin D normal at 46.7 (19.9-79. 3)    It appears that patient has primary hyper parathyroidism. Currently calcium elevation is mild, may be because of vitamin D deficiency. Optimize vitamin D replacement with vitamin D 2000 units twice a day and repeated labs.     11-:  25 hydroxy vitamin D normal at 33.6  Calcium borderline elevated at 10.4 (8.6-10.2)  PTH at the upper end of normal range at 61  24-hour urine calcium normal at 186 (0-320)    10-7-2021 bone density scan:  Lumbar spine T score -2.8  Right femoral neck T score -2.3  Right hip total T score -1.5  Left femoral neck T score -1.4  Left hip total T score -0.5    Patient has Primary hyperparathyroidism. Although calcium elevation is mild, patient has osteoporosis and he meets criteria for surgery. Plan:  Advised patient to clarify surgery date with ENT. I will see him back a couple weeks after parathyroidectomy with labs including CMP, PTH and vitamin D prior to the visit. He will need treatment for osteoporosis after surgery. Osteoporosis:  10-7-2021 bone density scan:  Lumbar spine T score -2.8  Right femoral neck T score -2.3  Right hip total T score -1.5  Left femoral neck T score -1.4  Left hip total T score -0.5  Plan:  Consume 2 servings of dairy for bone health. Avoid calcium tablets, Tums. Avoid falls. Weightbearing exercises as tolerated. I will start him on antiresorptive medication after parathyroid surgery. Vitamin D deficiency:  25 hydroxy vitamin D low at 12 on 5-  Agree with avoiding high-dose vitamin D replacement  Start gentle replacement with cholecalciferol 2000 units daily    9-:  25 hydroxy vitamin D low at 18.5  Increase vitamin D to 2000 units twice a day. 11-:  25 hydroxy vitamin D normal at 33.3  Vitamin D decreased from 2000 units twice a day to 2000 units once a day  Plan:  Check vitamin D level prior to next visit. Mixed hyperlipidemia:  He was on rosuvastatin. Not sure why it was stopped. There is no contraindication to being on rosuvastatin from endocrine standpoint because of hypercalcemia. He will benefit from going back on rosuvastatin given history of diabetes and coronary artery disease. Essential hypertension:  Blood pressure elevated today but much better than before. Defer to PCP. Advised patient to avoid NSAIDs and work on low-salt diet.     Orders Placed This Encounter    METABOLIC PANEL, COMPREHENSIVE     Standing Status:   Future     Standing Expiration Date:   8/3/2022    PTH INTACT     Standing Status:   Future     Standing Expiration Date:   8/3/2022    VITAMIN D, 25 HYDROXY     Standing Status:   Future     Standing Expiration Date:   8/3/2022        Signed By: John Mackey MD     February 3, 2022      Return to clinic 3 months

## 2022-02-03 NOTE — TELEPHONE ENCOUNTER
Unable to LVM,  . According to Deirdre Elizabeth pt has a primary coverage with Medicare.  We need that member ID to get the surgery approved

## 2022-02-04 ENCOUNTER — TELEPHONE (OUTPATIENT)
Dept: ENT CLINIC | Age: 67
End: 2022-02-04

## 2022-02-04 NOTE — TELEPHONE ENCOUNTER
Pt's contact called stating that they left an appt yesterday and got a print out of their summary and looked at his future appts and noticed that his surgery was scheduled for 02/24/2022 in the system but, his pre op and post op are in march and April. So she believes that it was entered into the system in error and his surgery is supposed to be March 24. I informed her I would bring this to our surgery schedulers attention and verify if this was an error. Please advise.

## 2022-02-09 ENCOUNTER — TELEPHONE (OUTPATIENT)
Dept: ENT CLINIC | Age: 67
End: 2022-02-09

## 2022-02-09 NOTE — TELEPHONE ENCOUNTER
Spoke with pt regarding insurance. 58 [address] Cordero stated Medicare is pt primary insurance. Pt is unaware of this change.  Pt stated he is going pt call [employer] to find out if this is true and then try to get Medicare ID number

## 2022-03-03 DIAGNOSIS — E21.0 PRIMARY HYPERPARATHYROIDISM (HCC): ICD-10-CM

## 2022-03-11 LAB
25(OH)D3+25(OH)D2 SERPL-MCNC: 28.6 NG/ML (ref 30–100)
ALBUMIN SERPL-MCNC: 4.1 G/DL (ref 3.8–4.8)
ALBUMIN/GLOB SERPL: 1.1 {RATIO} (ref 1.2–2.2)
ALP SERPL-CCNC: 97 IU/L (ref 44–121)
ALT SERPL-CCNC: 39 IU/L (ref 0–44)
AST SERPL-CCNC: 32 IU/L (ref 0–40)
BILIRUB SERPL-MCNC: 0.4 MG/DL (ref 0–1.2)
BUN SERPL-MCNC: 7 MG/DL (ref 8–27)
BUN/CREAT SERPL: 6 (ref 10–24)
CALCIUM SERPL-MCNC: 10.4 MG/DL (ref 8.6–10.2)
CHLORIDE SERPL-SCNC: 107 MMOL/L (ref 96–106)
CO2 SERPL-SCNC: 19 MMOL/L (ref 20–29)
CREAT SERPL-MCNC: 1.09 MG/DL (ref 0.76–1.27)
EGFR: 75 ML/MIN/1.73
GLOBULIN SER CALC-MCNC: 3.8 G/DL (ref 1.5–4.5)
GLUCOSE SERPL-MCNC: 187 MG/DL (ref 65–99)
POTASSIUM SERPL-SCNC: 4.1 MMOL/L (ref 3.5–5.2)
PROT SERPL-MCNC: 7.9 G/DL (ref 6–8.5)
PTH-INTACT SERPL-MCNC: 70 PG/ML (ref 15–65)
SODIUM SERPL-SCNC: 145 MMOL/L (ref 134–144)

## 2022-03-11 NOTE — PROGRESS NOTES
Please inform patient that his labs continue to be abnormal because he has not had surgery yet. He was supposed to do his labs after the surgery. I will reorder labs when I see him next.

## 2022-03-15 ENCOUNTER — HOSPITAL ENCOUNTER (OUTPATIENT)
Dept: PREADMISSION TESTING | Age: 67
Discharge: HOME | End: 2022-03-15
Payer: MEDICARE

## 2022-03-15 VITALS
SYSTOLIC BLOOD PRESSURE: 155 MMHG | TEMPERATURE: 98.2 F | HEIGHT: 66 IN | BODY MASS INDEX: 34.78 KG/M2 | RESPIRATION RATE: 18 BRPM | OXYGEN SATURATION: 98 % | WEIGHT: 216.4 LBS | HEART RATE: 83 BPM | DIASTOLIC BLOOD PRESSURE: 80 MMHG

## 2022-03-15 LAB
ABO + RH BLD: NORMAL
BLOOD GROUP ANTIBODIES SERPL: NEGATIVE
CA-I BLD-MCNC: 10.2 MG/DL (ref 8.5–10.1)
ERYTHROCYTE [DISTWIDTH] IN BLOOD BY AUTOMATED COUNT: 12.6 % (ref 11.5–14.5)
HCT VFR BLD AUTO: 36.8 % (ref 36.6–50.3)
HGB BLD-MCNC: 12.1 G/DL (ref 12.1–17)
MCH RBC QN AUTO: 28.3 PG (ref 26–34)
MCHC RBC AUTO-ENTMCNC: 32.9 G/DL (ref 30–36.5)
MCV RBC AUTO: 86 FL (ref 80–99)
NRBC # BLD: 0 K/UL (ref 0–0.01)
NRBC BLD-RTO: 0 PER 100 WBC
PLATELET # BLD AUTO: 222 K/UL (ref 150–400)
PMV BLD AUTO: 10.9 FL (ref 8.9–12.9)
RBC # BLD AUTO: 4.28 M/UL (ref 4.1–5.7)
SPECIMEN EXP DATE BLD: NORMAL
WBC # BLD AUTO: 10.7 K/UL (ref 4.1–11.1)

## 2022-03-15 PROCEDURE — 85027 COMPLETE CBC AUTOMATED: CPT

## 2022-03-15 PROCEDURE — 93005 ELECTROCARDIOGRAM TRACING: CPT

## 2022-03-15 PROCEDURE — 82310 ASSAY OF CALCIUM: CPT

## 2022-03-15 PROCEDURE — 83970 ASSAY OF PARATHORMONE: CPT

## 2022-03-15 PROCEDURE — 36415 COLL VENOUS BLD VENIPUNCTURE: CPT

## 2022-03-15 PROCEDURE — 86900 BLOOD TYPING SEROLOGIC ABO: CPT

## 2022-03-15 RX ORDER — CHOLECALCIFEROL (VITAMIN D3) 125 MCG
2000 CAPSULE ORAL DAILY
COMMUNITY
End: 2022-05-03 | Stop reason: SDUPTHER

## 2022-03-15 RX ORDER — HYDRALAZINE HYDROCHLORIDE 10 MG/1
50 TABLET, FILM COATED ORAL 3 TIMES DAILY
COMMUNITY
End: 2022-05-03 | Stop reason: ALTCHOICE

## 2022-03-15 RX ORDER — SIMVASTATIN 20 MG/1
20 TABLET, FILM COATED ORAL
COMMUNITY
End: 2022-05-03 | Stop reason: ALTCHOICE

## 2022-03-16 ENCOUNTER — OFFICE VISIT (OUTPATIENT)
Dept: ENT CLINIC | Age: 67
End: 2022-03-16
Payer: MEDICARE

## 2022-03-16 VITALS
HEART RATE: 95 BPM | TEMPERATURE: 98.3 F | SYSTOLIC BLOOD PRESSURE: 160 MMHG | HEIGHT: 65 IN | BODY MASS INDEX: 35.99 KG/M2 | RESPIRATION RATE: 18 BRPM | WEIGHT: 216 LBS | OXYGEN SATURATION: 97 % | DIASTOLIC BLOOD PRESSURE: 88 MMHG

## 2022-03-16 DIAGNOSIS — E21.3 HYPERPARATHYROIDISM (HCC): Primary | ICD-10-CM

## 2022-03-16 DIAGNOSIS — E83.52 HYPERCALCEMIA: ICD-10-CM

## 2022-03-16 LAB
ATRIAL RATE: 78 BPM
CA-I BLD-MCNC: 10.3 MG/DL (ref 8.5–10.1)
CALCULATED P AXIS, ECG09: 52 DEGREES
CALCULATED R AXIS, ECG10: -27 DEGREES
CALCULATED T AXIS, ECG11: 30 DEGREES
DIAGNOSIS, 93000: NORMAL
MRSA DNA SPEC QL NAA+PROBE: DETECTED
P-R INTERVAL, ECG05: 170 MS
PTH-INTACT SERPL-MCNC: 144.7 PG/ML (ref 18.4–88)
Q-T INTERVAL, ECG07: 350 MS
QRS DURATION, ECG06: 80 MS
QTC CALCULATION (BEZET), ECG08: 399 MS
VENTRICULAR RATE, ECG03: 78 BPM

## 2022-03-16 PROCEDURE — G8754 DIAS BP LESS 90: HCPCS | Performed by: OTOLARYNGOLOGY

## 2022-03-16 PROCEDURE — 1101F PT FALLS ASSESS-DOCD LE1/YR: CPT | Performed by: OTOLARYNGOLOGY

## 2022-03-16 PROCEDURE — 93010 ELECTROCARDIOGRAM REPORT: CPT | Performed by: OTOLARYNGOLOGY

## 2022-03-16 PROCEDURE — 99214 OFFICE O/P EST MOD 30 MIN: CPT | Performed by: OTOLARYNGOLOGY

## 2022-03-16 PROCEDURE — G8427 DOCREV CUR MEDS BY ELIG CLIN: HCPCS | Performed by: OTOLARYNGOLOGY

## 2022-03-16 PROCEDURE — G8536 NO DOC ELDER MAL SCRN: HCPCS | Performed by: OTOLARYNGOLOGY

## 2022-03-16 PROCEDURE — 3017F COLORECTAL CA SCREEN DOC REV: CPT | Performed by: OTOLARYNGOLOGY

## 2022-03-16 PROCEDURE — 87641 MR-STAPH DNA AMP PROBE: CPT

## 2022-03-16 PROCEDURE — G8417 CALC BMI ABV UP PARAM F/U: HCPCS | Performed by: OTOLARYNGOLOGY

## 2022-03-16 PROCEDURE — G8753 SYS BP > OR = 140: HCPCS | Performed by: OTOLARYNGOLOGY

## 2022-03-16 PROCEDURE — G8510 SCR DEP NEG, NO PLAN REQD: HCPCS | Performed by: OTOLARYNGOLOGY

## 2022-03-16 NOTE — PROGRESS NOTES
Otolaryngology-Head and Neck Surgery  New Patient Visit     Patient: Efren Brooks  YOB: 1955  MRN: [id number]  Date of Service: 3/16/2022    Chief Complaint: Hyperparathyroidism    History of Present Illness: Efren Brooks is a 74y.o. year old male who presents today accompanied by daughter for discussion of    Referred by Dr. Annabella Ortiz, endocrinology    Reports right hip pain for 2 years  Saw PCP In June and found to have elevated calcium and intermittent abnormal PTH  Has had no imaging    Denies difficulty swallowing, voice changes   +fatigue, muscle cramping    Per endo note: Onset: 1 year back     Symptoms of hypercalcemia: Constipation, fatigue. Denies any polyuria, polydipsia, stomach pain, kidney stones, bone fractures, episodes of confusion. :  Calcium slightly elevated at 10.8 (8.6-10.2)    11-:  Ionized calcium borderline elevated at 5.7 (4.5-5.6)     1-:  Calcium elevated at 11.1 (8.6-10. 2)     5-:  Calcium elevated at 10.7 (8.6-10. 2)     5-:  Ionized calcium slightly elevated at 5.8 (4.5-5.6)  PTH normal at 46 (15-65)  Renal function normal.    9-:  Calcium borderline elevated at 10.5 (8.6-10.2)  PTH elevated at 67 (15-65)   It appears that patient has primary hyper parathyroidism. Currently calcium elevation is mild, may be because of vitamin D deficiency. Optimize vitamin D replacement with vitamin D 2000 units twice a day and repeated labs.    11-:  Calcium borderline elevated at 10.4 (8.6-10.2)  PTH at the upper end of normal range at 61     10-7-2021 bone density scan:  Lumbar spine T score -2.8  Right femoral neck T score -2.3  Right hip total T score -1.5  Left femoral neck T score -1.4  Left hip total T score -0.5  Plan:  Primary hyperparathyroidism. Although calcium elevation is mild, patient has osteoporosis and he meets criteria for surgery. He will need treatment for osteoporosis after surgery.   Referring patient to ENT.  I will see him back in 2 months. Continue to stay off calcium tablets, Tums, hydrochlorothiazide.       Family Hx: mother with parathyroid    1/28/22 - pt follows up today to discuss results of US and PTH scan      Past Medical History:  Past Medical History:   Diagnosis Date    CAD (coronary artery disease)     pt denies    Diabetes ([facility] [address]     Hypertension     Sleep apnea     CPAP       Past Surgical History:   Past Surgical History:   Procedure Laterality Date    HX CATARACT REMOVAL Bilateral     HX COLONOSCOPY         Medications:   Current Outpatient Medications   Medication Instructions    amLODIPine (NORVASC) 10 mg, Oral, DAILY    aspirin 81 mg, Oral, DAILY    Bydureon BCise 2 mg/0.85 mL atIn No dose, route, or frequency recorded.  cholecalciferol (vitamin D3) (VITAMIN D3) 2,000 Units, Oral, DAILY    dapagliflozin (FARXIGA) 10 mg, Oral, DAILY    gabapentin (NEURONTIN) 300 mg, Oral, 2 TIMES DAILY    hydrALAZINE (APRESOLINE) 10 mg, Oral, 3 TIMES DAILY    lisinopriL (PRINIVIL, ZESTRIL) 40 mg, Oral, DAILY    nebivoloL (BYSTOLIC) 20 mg, Oral, DAILY    potassium chloride SR (KLOR-CON 10) 10 mEq tablet 10 mEq, DAILY    semaglutide (OZEMPIC SC) SubCUTAneous    simvastatin (ZOCOR) 20 mg, Oral, EVERY BEDTIME    SITagliptin-metFORMIN (Janumet) 50-1,000 mg per tablet 1 Tablet, Oral, 2 TIMES DAILY WITH MEALS    traMADoL (ULTRAM) 50 mg, Oral, EVERY 12 HOURS       Allergies: Allergies   Allergen Reactions    Cortisone Anaphylaxis       Social History:   Social History     Tobacco Use    Smoking status: Never Smoker    Smokeless tobacco: Never Used   Vaping Use    Vaping Use: Never used   Substance Use Topics    Alcohol use: Never    Drug use: Never        Family History:  Family History   Problem Relation Age of Onset    Diabetes Mother        Review of Systems:    Consitutional: denies fever, excessive weight gain or loss. Eyes: denies diplopia, eye pain.   Integumentary: denies new concerning skin lesions. Ears, Nose, Mouth, Throat: denies except as per HPI. Endocrine: denies hot or cold intolerance, increased thirst.  Respiratory: denies cough, hemoptysis, wheezing  Gastrointestinal: denies trouble swallowing, nausea, emesis, regurgitation  Musculoskeletal: denies muscle weakness or wasting  Cardiovascular: denies chest pain, shortness of breath  Neurologic: denies seizures, numbness or tingling, syncope  Hematologic: denies easy bleeding or bruising    Physical Examination:   Vitals:    03/16/22 1256   BP: (!) 160/88   BP 1 Location: Left upper arm   BP Patient Position: Sitting   BP Cuff Size: Adult   Pulse: 95   Temp: 98.3 °F (36.8 °C)   TempSrc: Temporal   Resp: 18   Height: 5' 5\" (1.651 m)   Weight: 216 lb (98 kg)   SpO2: 97%        General: Comfortable, pleasant, appears stated age  Voice: Strong, speaking in full sentences, no stridor    Face: No masses or lesions, facial strength symmetric   Ears: External ears unremarkable. Bilateral ear canal clear. Tympanic membrane clear and intact, with visible landmarks. Clear middle ear space  Nose: External nose unremarkable. Dorsum midline. Anterior rhinoscopy demonstrates no lesions. Septum midline. Turbinates without hypertrophy. Oral Cavity / Oropharynx: No trismus. Mucosa pink and moist. No lesions. Tongue is midline and mobile. Palate elevates symmetrically. Uvula midline. Tonsils unremarkable. Base of tongue soft. Floor of mouth soft. Neck: Supple. No adenopathy. Thyroid unremarkable. Palpable laryngeal landmarks. Full neck range of motion   Neurologic: CN II - XI intact. Normal gait        IMPRESSION: 2.9 cm hypoechoic nodule posterior to the right thyroid gland is  compatible with parathyroid adenoma. Small left thyroid nodule. Recent labs showing CA 10.2 and . Assessment and Plan:   1. Hyperparathyroidism   2. Hypercalcemia  3. Osteoporosis    We reviewed his scans, labs.   He is a good candidate for parathyroidectomy. He has localizing lesion to right lower thyroid region. We reviewed his imaging studied together. Hold ASA starting today    Risks/benefits parathyroidectomy discussed with patient including bleeding, infection, recurrence, hypocalcemia, RLN injury. Questions answered.

## 2022-03-16 NOTE — PROGRESS NOTES
Visit Vitals  Blood Pressure (Abnormal) 160/88 (BP 1 Location: Left upper arm, BP Patient Position: Sitting, BP Cuff Size: Adult)   Pulse 95   Temperature 98.3 °F (36.8 °C) (Temporal)   Respiration 18   Height 5' 5\" (1.651 m)   Weight 216 lb (98 kg)   Oxygen Saturation 97%   Body Mass Index 35.94 kg/m²     Chief Complaint   Patient presents with    Pre-op Exam

## 2022-03-17 ENCOUNTER — TELEPHONE (OUTPATIENT)
Dept: ENT CLINIC | Age: 67
End: 2022-03-17

## 2022-03-17 RX ORDER — MUPIROCIN 20 MG/G
OINTMENT TOPICAL
Qty: 22 G | Refills: 0 | Status: SHIPPED | OUTPATIENT
Start: 2022-03-17 | End: 2022-05-12

## 2022-03-17 NOTE — TELEPHONE ENCOUNTER
PAT would like to know if any medication is going to be sent to pt for positive MRSA test prior to surgery on 3/24

## 2022-03-18 PROBLEM — E11.65 TYPE 2 DIABETES MELLITUS WITH HYPERGLYCEMIA, WITHOUT LONG-TERM CURRENT USE OF INSULIN (HCC): Status: ACTIVE | Noted: 2021-07-28

## 2022-03-18 PROBLEM — I10 BENIGN ESSENTIAL HTN: Status: ACTIVE | Noted: 2021-07-28

## 2022-03-19 PROBLEM — E78.2 MIXED HYPERLIPIDEMIA: Status: ACTIVE | Noted: 2021-07-28

## 2022-03-19 PROBLEM — E83.52 HYPERCALCEMIA: Status: ACTIVE | Noted: 2021-07-28

## 2022-03-20 ENCOUNTER — HOSPITAL ENCOUNTER (OUTPATIENT)
Dept: PREADMISSION TESTING | Age: 67
Discharge: HOME | End: 2022-03-20
Payer: MEDICARE

## 2022-03-20 LAB — SARS-COV-2, COV2: NORMAL

## 2022-03-20 PROCEDURE — U0005 INFEC AGEN DETEC AMPLI PROBE: HCPCS

## 2022-03-21 LAB
SARS-COV-2, XPLCVT: NOT DETECTED
SOURCE, COVRS: NORMAL

## 2022-03-24 ENCOUNTER — TELEPHONE (OUTPATIENT)
Dept: ENT CLINIC | Age: 67
End: 2022-03-24

## 2022-03-28 ENCOUNTER — TELEPHONE (OUTPATIENT)
Dept: ENT CLINIC | Age: 67
End: 2022-03-28

## 2022-04-08 ENCOUNTER — VIRTUAL VISIT (OUTPATIENT)
Dept: ENT CLINIC | Age: 67
End: 2022-04-08

## 2022-04-08 DIAGNOSIS — E21.3 HYPERPARATHYROIDISM (HCC): Primary | ICD-10-CM

## 2022-04-08 NOTE — H&P (VIEW-ONLY)
Patient unable to complete video visit due to technical difficulties. His parathyroid surgery was postponed due to need for cardiac clearance which has since been obtained. We once again discussed the risk and benefits of surgery at length and questions are answered. He is off his aspirin. Surgery is scheduled for April 21.

## 2022-04-13 ENCOUNTER — HOSPITAL ENCOUNTER (OUTPATIENT)
Dept: PREADMISSION TESTING | Age: 67
Discharge: HOME | End: 2022-04-13
Payer: MEDICARE

## 2022-04-13 VITALS
HEART RATE: 90 BPM | RESPIRATION RATE: 18 BRPM | SYSTOLIC BLOOD PRESSURE: 165 MMHG | WEIGHT: 215 LBS | BODY MASS INDEX: 34.55 KG/M2 | DIASTOLIC BLOOD PRESSURE: 86 MMHG | OXYGEN SATURATION: 100 % | HEIGHT: 66 IN | TEMPERATURE: 98.5 F

## 2022-04-13 LAB
ABO + RH BLD: NORMAL
BLOOD GROUP ANTIBODIES SERPL: NEGATIVE
CA-I BLD-MCNC: 10.2 MG/DL (ref 8.5–10.1)
CA-I BLD-MCNC: 10.5 MG/DL (ref 8.5–10.1)
ERYTHROCYTE [DISTWIDTH] IN BLOOD BY AUTOMATED COUNT: 12.7 % (ref 11.5–14.5)
EST. AVERAGE GLUCOSE BLD GHB EST-MCNC: 169 MG/DL
HBA1C MFR BLD: 7.5 % (ref 4–5.6)
HCT VFR BLD AUTO: 35.9 % (ref 36.6–50.3)
HGB BLD-MCNC: 11.9 G/DL (ref 12.1–17)
MCH RBC QN AUTO: 28.3 PG (ref 26–34)
MCHC RBC AUTO-ENTMCNC: 33.1 G/DL (ref 30–36.5)
MCV RBC AUTO: 85.3 FL (ref 80–99)
MRSA DNA SPEC QL NAA+PROBE: NOT DETECTED
NRBC # BLD: 0 K/UL (ref 0–0.01)
NRBC BLD-RTO: 0 PER 100 WBC
PLATELET # BLD AUTO: 236 K/UL (ref 150–400)
PMV BLD AUTO: 10.6 FL (ref 8.9–12.9)
PTH-INTACT SERPL-MCNC: 119.5 PG/ML (ref 18.4–88)
RBC # BLD AUTO: 4.21 M/UL (ref 4.1–5.7)
SPECIMEN EXP DATE BLD: NORMAL
WBC # BLD AUTO: 9.8 K/UL (ref 4.1–11.1)

## 2022-04-13 PROCEDURE — 82310 ASSAY OF CALCIUM: CPT

## 2022-04-13 PROCEDURE — 86900 BLOOD TYPING SEROLOGIC ABO: CPT

## 2022-04-13 PROCEDURE — 36415 COLL VENOUS BLD VENIPUNCTURE: CPT

## 2022-04-13 PROCEDURE — 83036 HEMOGLOBIN GLYCOSYLATED A1C: CPT

## 2022-04-13 PROCEDURE — 85027 COMPLETE CBC AUTOMATED: CPT

## 2022-04-13 PROCEDURE — 87641 MR-STAPH DNA AMP PROBE: CPT

## 2022-04-13 PROCEDURE — 83970 ASSAY OF PARATHORMONE: CPT

## 2022-04-18 ENCOUNTER — TELEPHONE (OUTPATIENT)
Dept: ENT CLINIC | Age: 67
End: 2022-04-18

## 2022-04-18 NOTE — PERIOP NOTES
Called Dr. Casey Lundborg office spoke to [facility], made aware of lab results, Calcium, PTH and HgbA1c.   Louis stated she would send message to MD.

## 2022-04-18 NOTE — TELEPHONE ENCOUNTER
RICKIE called and wanted to give some lab information on this patient. They stated his Calcium is 10.2, his PTH is 119.5, his Hemoglobin A1C is 7.5, and they wanted to let Luke know that they received the medical clearance from the pt's PCP.

## 2022-04-19 ENCOUNTER — OFFICE VISIT (OUTPATIENT)
Dept: ENDOCRINOLOGY | Age: 67
End: 2022-04-19

## 2022-04-21 ENCOUNTER — HOSPITAL ENCOUNTER (OUTPATIENT)
Age: 67
Discharge: HOME | End: 2022-04-22
Attending: OTOLARYNGOLOGY | Admitting: OTOLARYNGOLOGY
Payer: MEDICARE

## 2022-04-21 ENCOUNTER — ANESTHESIA (OUTPATIENT)
Dept: SURGERY | Age: 67
End: 2022-04-21
Payer: MEDICARE

## 2022-04-21 ENCOUNTER — ANESTHESIA EVENT (OUTPATIENT)
Dept: SURGERY | Age: 67
End: 2022-04-21
Payer: MEDICARE

## 2022-04-21 DIAGNOSIS — E21.3 HYPERPARATHYROIDISM (HCC): Primary | ICD-10-CM

## 2022-04-21 DIAGNOSIS — I10 BENIGN ESSENTIAL HTN: ICD-10-CM

## 2022-04-21 LAB
GLUCOSE BLD STRIP.AUTO-MCNC: 190 MG/DL (ref 65–117)
GLUCOSE BLD STRIP.AUTO-MCNC: 202 MG/DL (ref 65–117)
GLUCOSE BLD STRIP.AUTO-MCNC: 204 MG/DL (ref 65–117)
PERFORMED BY, TECHID: ABNORMAL

## 2022-04-21 PROCEDURE — 88305 TISSUE EXAM BY PATHOLOGIST: CPT

## 2022-04-21 PROCEDURE — 74011250636 HC RX REV CODE- 250/636

## 2022-04-21 PROCEDURE — 76010000162 HC OR TIME 1.5 TO 2 HR INTENSV-TIER 1: Performed by: OTOLARYNGOLOGY

## 2022-04-21 PROCEDURE — 77030040361 HC SLV COMPR DVT MDII -B: Performed by: OTOLARYNGOLOGY

## 2022-04-21 PROCEDURE — 76210000016 HC OR PH I REC 1 TO 1.5 HR: Performed by: OTOLARYNGOLOGY

## 2022-04-21 PROCEDURE — 74011250637 HC RX REV CODE- 250/637: Performed by: OTOLARYNGOLOGY

## 2022-04-21 PROCEDURE — 77030008462 HC STPLR SKN PROX J&J -A: Performed by: OTOLARYNGOLOGY

## 2022-04-21 PROCEDURE — 77030002974 HC SUT PLN J&J -A: Performed by: OTOLARYNGOLOGY

## 2022-04-21 PROCEDURE — 88307 TISSUE EXAM BY PATHOLOGIST: CPT

## 2022-04-21 PROCEDURE — 83970 ASSAY OF PARATHORMONE: CPT

## 2022-04-21 PROCEDURE — 77030011267 HC ELECTRD BLD COVD -A: Performed by: OTOLARYNGOLOGY

## 2022-04-21 PROCEDURE — 77030002996 HC SUT SLK J&J -A: Performed by: OTOLARYNGOLOGY

## 2022-04-21 PROCEDURE — 77030013079 HC BLNKT BAIR HGGR 3M -A: Performed by: ANESTHESIOLOGY

## 2022-04-21 PROCEDURE — 77030034626 HC LIGASURE SM JAW SEAL OPN SURG COVD -E: Performed by: OTOLARYNGOLOGY

## 2022-04-21 PROCEDURE — 82962 GLUCOSE BLOOD TEST: CPT

## 2022-04-21 PROCEDURE — 74011000250 HC RX REV CODE- 250

## 2022-04-21 PROCEDURE — 77030026438 HC STYL ET INTUB CARD -A: Performed by: ANESTHESIOLOGY

## 2022-04-21 PROCEDURE — 74011000250 HC RX REV CODE- 250: Performed by: OTOLARYNGOLOGY

## 2022-04-21 PROCEDURE — 74011250636 HC RX REV CODE- 250/636: Performed by: OTOLARYNGOLOGY

## 2022-04-21 PROCEDURE — 74011636637 HC RX REV CODE- 636/637: Performed by: OTOLARYNGOLOGY

## 2022-04-21 PROCEDURE — 77030041621 HC DRN WND BARD -A: Performed by: OTOLARYNGOLOGY

## 2022-04-21 PROCEDURE — 60500 EXPLORE PARATHYROID GLANDS: CPT | Performed by: OTOLARYNGOLOGY

## 2022-04-21 PROCEDURE — 36415 COLL VENOUS BLD VENIPUNCTURE: CPT

## 2022-04-21 PROCEDURE — 76060000034 HC ANESTHESIA 1.5 TO 2 HR: Performed by: OTOLARYNGOLOGY

## 2022-04-21 PROCEDURE — 77030008684 HC TU ET CUF COVD -B: Performed by: ANESTHESIOLOGY

## 2022-04-21 PROCEDURE — 2709999900 HC NON-CHARGEABLE SUPPLY: Performed by: OTOLARYNGOLOGY

## 2022-04-21 PROCEDURE — 88331 PATH CONSLTJ SURG 1 BLK 1SPC: CPT

## 2022-04-21 PROCEDURE — 77030038156 HC CRD BPLR DISP CARF -A: Performed by: OTOLARYNGOLOGY

## 2022-04-21 PROCEDURE — 77030002933 HC SUT MCRYL J&J -A: Performed by: OTOLARYNGOLOGY

## 2022-04-21 PROCEDURE — 77030031139 HC SUT VCRL2 J&J -A: Performed by: OTOLARYNGOLOGY

## 2022-04-21 PROCEDURE — 74011250636 HC RX REV CODE- 250/636: Performed by: ANESTHESIOLOGY

## 2022-04-21 RX ORDER — LIDOCAINE HYDROCHLORIDE 20 MG/ML
INJECTION, SOLUTION EPIDURAL; INFILTRATION; INTRACAUDAL; PERINEURAL AS NEEDED
Status: DISCONTINUED | OUTPATIENT
Start: 2022-04-21 | End: 2022-04-21 | Stop reason: HOSPADM

## 2022-04-21 RX ORDER — ATENOLOL 25 MG/1
25 TABLET ORAL DAILY
Status: DISCONTINUED | OUTPATIENT
Start: 2022-04-22 | End: 2022-04-22 | Stop reason: HOSPADM

## 2022-04-21 RX ORDER — OXYCODONE AND ACETAMINOPHEN 5; 325 MG/1; MG/1
2 TABLET ORAL AS NEEDED
Status: DISCONTINUED | OUTPATIENT
Start: 2022-04-21 | End: 2022-04-21 | Stop reason: HOSPADM

## 2022-04-21 RX ORDER — SODIUM CHLORIDE 0.9 % (FLUSH) 0.9 %
5-40 SYRINGE (ML) INJECTION AS NEEDED
Status: DISCONTINUED | OUTPATIENT
Start: 2022-04-21 | End: 2022-04-21 | Stop reason: HOSPADM

## 2022-04-21 RX ORDER — ONDANSETRON 2 MG/ML
4 INJECTION INTRAMUSCULAR; INTRAVENOUS AS NEEDED
Status: DISCONTINUED | OUTPATIENT
Start: 2022-04-21 | End: 2022-04-21 | Stop reason: HOSPADM

## 2022-04-21 RX ORDER — DIPHENHYDRAMINE HYDROCHLORIDE 50 MG/ML
12.5 INJECTION, SOLUTION INTRAMUSCULAR; INTRAVENOUS AS NEEDED
Status: DISCONTINUED | OUTPATIENT
Start: 2022-04-21 | End: 2022-04-21 | Stop reason: HOSPADM

## 2022-04-21 RX ORDER — OXYCODONE HYDROCHLORIDE 5 MG/1
5 TABLET ORAL
Status: DISCONTINUED | OUTPATIENT
Start: 2022-04-21 | End: 2022-04-22 | Stop reason: HOSPADM

## 2022-04-21 RX ORDER — MELATONIN
2000 DAILY
Status: DISCONTINUED | OUTPATIENT
Start: 2022-04-22 | End: 2022-04-22 | Stop reason: HOSPADM

## 2022-04-21 RX ORDER — FENTANYL CITRATE 50 UG/ML
50 INJECTION, SOLUTION INTRAMUSCULAR; INTRAVENOUS
Status: DISCONTINUED | OUTPATIENT
Start: 2022-04-21 | End: 2022-04-21 | Stop reason: HOSPADM

## 2022-04-21 RX ORDER — LIDOCAINE HYDROCHLORIDE 20 MG/ML
INJECTION, SOLUTION INFILTRATION; PERINEURAL AS NEEDED
Status: DISCONTINUED | OUTPATIENT
Start: 2022-04-21 | End: 2022-04-21 | Stop reason: HOSPADM

## 2022-04-21 RX ORDER — SODIUM CHLORIDE 0.9 % (FLUSH) 0.9 %
5-40 SYRINGE (ML) INJECTION EVERY 8 HOURS
Status: DISCONTINUED | OUTPATIENT
Start: 2022-04-21 | End: 2022-04-22 | Stop reason: HOSPADM

## 2022-04-21 RX ORDER — MIDAZOLAM HYDROCHLORIDE 1 MG/ML
INJECTION, SOLUTION INTRAMUSCULAR; INTRAVENOUS AS NEEDED
Status: DISCONTINUED | OUTPATIENT
Start: 2022-04-21 | End: 2022-04-21 | Stop reason: HOSPADM

## 2022-04-21 RX ORDER — ROCURONIUM BROMIDE 10 MG/ML
INJECTION, SOLUTION INTRAVENOUS AS NEEDED
Status: DISCONTINUED | OUTPATIENT
Start: 2022-04-21 | End: 2022-04-21 | Stop reason: HOSPADM

## 2022-04-21 RX ORDER — MUPIROCIN 20 MG/G
OINTMENT TOPICAL DAILY
Status: DISCONTINUED | OUTPATIENT
Start: 2022-04-22 | End: 2022-04-22 | Stop reason: HOSPADM

## 2022-04-21 RX ORDER — MORPHINE SULFATE 10 MG/ML
2 INJECTION, SOLUTION INTRAMUSCULAR; INTRAVENOUS
Status: DISCONTINUED | OUTPATIENT
Start: 2022-04-21 | End: 2022-04-22 | Stop reason: HOSPADM

## 2022-04-21 RX ORDER — ONDANSETRON 2 MG/ML
4 INJECTION INTRAMUSCULAR; INTRAVENOUS
Status: DISCONTINUED | OUTPATIENT
Start: 2022-04-21 | End: 2022-04-22 | Stop reason: HOSPADM

## 2022-04-21 RX ORDER — ONDANSETRON 2 MG/ML
INJECTION INTRAMUSCULAR; INTRAVENOUS AS NEEDED
Status: DISCONTINUED | OUTPATIENT
Start: 2022-04-21 | End: 2022-04-21 | Stop reason: HOSPADM

## 2022-04-21 RX ORDER — LISINOPRIL 40 MG/1
40 TABLET ORAL DAILY
Status: DISCONTINUED | OUTPATIENT
Start: 2022-04-22 | End: 2022-04-22 | Stop reason: HOSPADM

## 2022-04-21 RX ORDER — FENTANYL CITRATE 50 UG/ML
25 INJECTION, SOLUTION INTRAMUSCULAR; INTRAVENOUS
Status: DISCONTINUED | OUTPATIENT
Start: 2022-04-21 | End: 2022-04-21 | Stop reason: HOSPADM

## 2022-04-21 RX ORDER — PROPOFOL 10 MG/ML
INJECTION, EMULSION INTRAVENOUS AS NEEDED
Status: DISCONTINUED | OUTPATIENT
Start: 2022-04-21 | End: 2022-04-21 | Stop reason: HOSPADM

## 2022-04-21 RX ORDER — INSULIN LISPRO 100 [IU]/ML
INJECTION, SOLUTION INTRAVENOUS; SUBCUTANEOUS
Status: DISCONTINUED | OUTPATIENT
Start: 2022-04-21 | End: 2022-04-22 | Stop reason: HOSPADM

## 2022-04-21 RX ORDER — NALOXONE HYDROCHLORIDE 0.4 MG/ML
0.4 INJECTION, SOLUTION INTRAMUSCULAR; INTRAVENOUS; SUBCUTANEOUS AS NEEDED
Status: DISCONTINUED | OUTPATIENT
Start: 2022-04-21 | End: 2022-04-22 | Stop reason: HOSPADM

## 2022-04-21 RX ORDER — ALBUTEROL SULFATE 0.83 MG/ML
2.5 SOLUTION RESPIRATORY (INHALATION) AS NEEDED
Status: DISCONTINUED | OUTPATIENT
Start: 2022-04-21 | End: 2022-04-21 | Stop reason: HOSPADM

## 2022-04-21 RX ORDER — DEXTROSE MONOHYDRATE 100 MG/ML
0-250 INJECTION, SOLUTION INTRAVENOUS AS NEEDED
Status: DISCONTINUED | OUTPATIENT
Start: 2022-04-21 | End: 2022-04-22 | Stop reason: HOSPADM

## 2022-04-21 RX ORDER — ACETAMINOPHEN 325 MG/1
650 TABLET ORAL
Status: DISCONTINUED | OUTPATIENT
Start: 2022-04-21 | End: 2022-04-22 | Stop reason: HOSPADM

## 2022-04-21 RX ORDER — OXYCODONE HYDROCHLORIDE 10 MG/1
10 TABLET ORAL
Status: DISCONTINUED | OUTPATIENT
Start: 2022-04-21 | End: 2022-04-22 | Stop reason: HOSPADM

## 2022-04-21 RX ORDER — DIPHENHYDRAMINE HYDROCHLORIDE 50 MG/ML
12.5 INJECTION, SOLUTION INTRAMUSCULAR; INTRAVENOUS
Status: DISCONTINUED | OUTPATIENT
Start: 2022-04-21 | End: 2022-04-22 | Stop reason: HOSPADM

## 2022-04-21 RX ORDER — GABAPENTIN 300 MG/1
300 CAPSULE ORAL 2 TIMES DAILY
Status: DISCONTINUED | OUTPATIENT
Start: 2022-04-21 | End: 2022-04-22 | Stop reason: HOSPADM

## 2022-04-21 RX ORDER — ATORVASTATIN CALCIUM 10 MG/1
10 TABLET, FILM COATED ORAL
Status: DISCONTINUED | OUTPATIENT
Start: 2022-04-21 | End: 2022-04-22 | Stop reason: HOSPADM

## 2022-04-21 RX ORDER — TRAMADOL HYDROCHLORIDE 50 MG/1
50 TABLET ORAL EVERY 12 HOURS
Status: DISCONTINUED | OUTPATIENT
Start: 2022-04-21 | End: 2022-04-22 | Stop reason: HOSPADM

## 2022-04-21 RX ORDER — SODIUM CHLORIDE, SODIUM LACTATE, POTASSIUM CHLORIDE, CALCIUM CHLORIDE 600; 310; 30; 20 MG/100ML; MG/100ML; MG/100ML; MG/100ML
50 INJECTION, SOLUTION INTRAVENOUS CONTINUOUS
Status: DISCONTINUED | OUTPATIENT
Start: 2022-04-21 | End: 2022-04-22 | Stop reason: HOSPADM

## 2022-04-21 RX ORDER — CEFAZOLIN SODIUM 1 G/3ML
INJECTION, POWDER, FOR SOLUTION INTRAMUSCULAR; INTRAVENOUS AS NEEDED
Status: DISCONTINUED | OUTPATIENT
Start: 2022-04-21 | End: 2022-04-21 | Stop reason: HOSPADM

## 2022-04-21 RX ORDER — SODIUM CHLORIDE, SODIUM LACTATE, POTASSIUM CHLORIDE, CALCIUM CHLORIDE 600; 310; 30; 20 MG/100ML; MG/100ML; MG/100ML; MG/100ML
20 INJECTION, SOLUTION INTRAVENOUS CONTINUOUS
Status: DISCONTINUED | OUTPATIENT
Start: 2022-04-21 | End: 2022-04-21

## 2022-04-21 RX ORDER — SODIUM CHLORIDE, SODIUM LACTATE, POTASSIUM CHLORIDE, CALCIUM CHLORIDE 600; 310; 30; 20 MG/100ML; MG/100ML; MG/100ML; MG/100ML
INJECTION, SOLUTION INTRAVENOUS
Status: DISCONTINUED | OUTPATIENT
Start: 2022-04-21 | End: 2022-04-21 | Stop reason: HOSPADM

## 2022-04-21 RX ORDER — SODIUM CHLORIDE 0.9 % (FLUSH) 0.9 %
5-40 SYRINGE (ML) INJECTION AS NEEDED
Status: DISCONTINUED | OUTPATIENT
Start: 2022-04-21 | End: 2022-04-22 | Stop reason: HOSPADM

## 2022-04-21 RX ORDER — AMLODIPINE BESYLATE 5 MG/1
10 TABLET ORAL DAILY
Status: DISCONTINUED | OUTPATIENT
Start: 2022-04-22 | End: 2022-04-22 | Stop reason: HOSPADM

## 2022-04-21 RX ORDER — MAGNESIUM SULFATE 100 %
4 CRYSTALS MISCELLANEOUS AS NEEDED
Status: DISCONTINUED | OUTPATIENT
Start: 2022-04-21 | End: 2022-04-22 | Stop reason: HOSPADM

## 2022-04-21 RX ORDER — FENTANYL CITRATE 50 UG/ML
INJECTION, SOLUTION INTRAMUSCULAR; INTRAVENOUS AS NEEDED
Status: DISCONTINUED | OUTPATIENT
Start: 2022-04-21 | End: 2022-04-21 | Stop reason: HOSPADM

## 2022-04-21 RX ORDER — SUCCINYLCHOLINE CHLORIDE 20 MG/ML
INJECTION INTRAMUSCULAR; INTRAVENOUS AS NEEDED
Status: DISCONTINUED | OUTPATIENT
Start: 2022-04-21 | End: 2022-04-21 | Stop reason: HOSPADM

## 2022-04-21 RX ORDER — HYDRALAZINE HYDROCHLORIDE 50 MG/1
50 TABLET, FILM COATED ORAL 3 TIMES DAILY
Status: DISCONTINUED | OUTPATIENT
Start: 2022-04-21 | End: 2022-04-22 | Stop reason: HOSPADM

## 2022-04-21 RX ORDER — HYDROMORPHONE HYDROCHLORIDE 1 MG/ML
0.5 INJECTION, SOLUTION INTRAMUSCULAR; INTRAVENOUS; SUBCUTANEOUS
Status: DISCONTINUED | OUTPATIENT
Start: 2022-04-21 | End: 2022-04-21 | Stop reason: HOSPADM

## 2022-04-21 RX ORDER — SIMVASTATIN 10 MG/1
20 TABLET, FILM COATED ORAL
Status: DISCONTINUED | OUTPATIENT
Start: 2022-04-21 | End: 2022-04-21

## 2022-04-21 RX ADMIN — SODIUM CHLORIDE, SODIUM LACTATE, POTASSIUM CHLORIDE, CALCIUM CHLORIDE: 600; 310; 30; 20 INJECTION, SOLUTION INTRAVENOUS at 13:37

## 2022-04-21 RX ADMIN — ONDANSETRON 4 MG: 2 INJECTION INTRAMUSCULAR; INTRAVENOUS at 13:37

## 2022-04-21 RX ADMIN — PROPOFOL 50 MG: 10 INJECTION, EMULSION INTRAVENOUS at 13:48

## 2022-04-21 RX ADMIN — TRAMADOL HYDROCHLORIDE 50 MG: 50 TABLET, COATED ORAL at 21:35

## 2022-04-21 RX ADMIN — MIDAZOLAM HYDROCHLORIDE 2 MG: 2 INJECTION, SOLUTION INTRAMUSCULAR; INTRAVENOUS at 13:19

## 2022-04-21 RX ADMIN — PHENYLEPHRINE HYDROCHLORIDE 150 MCG: 10 INJECTION INTRAVENOUS at 14:50

## 2022-04-21 RX ADMIN — PROPOFOL 50 MG: 10 INJECTION, EMULSION INTRAVENOUS at 14:20

## 2022-04-21 RX ADMIN — LIDOCAINE HYDROCHLORIDE 100 MG: 20 INJECTION, SOLUTION EPIDURAL; INFILTRATION; INTRACAUDAL; PERINEURAL at 13:30

## 2022-04-21 RX ADMIN — PHENYLEPHRINE HYDROCHLORIDE 100 MCG: 10 INJECTION INTRAVENOUS at 14:42

## 2022-04-21 RX ADMIN — FENTANYL CITRATE 25 MCG: 0.05 INJECTION, SOLUTION INTRAMUSCULAR; INTRAVENOUS at 15:44

## 2022-04-21 RX ADMIN — SODIUM CHLORIDE, PRESERVATIVE FREE 10 ML: 5 INJECTION INTRAVENOUS at 21:35

## 2022-04-21 RX ADMIN — INSULIN LISPRO 3 UNITS: 100 INJECTION, SOLUTION INTRAVENOUS; SUBCUTANEOUS at 17:12

## 2022-04-21 RX ADMIN — PROPOFOL 150 MG: 10 INJECTION, EMULSION INTRAVENOUS at 13:30

## 2022-04-21 RX ADMIN — FENTANYL CITRATE 50 MCG: 50 INJECTION, SOLUTION INTRAMUSCULAR; INTRAVENOUS at 13:30

## 2022-04-21 RX ADMIN — ROCURONIUM BROMIDE 10 MG: 50 INJECTION, SOLUTION INTRAVENOUS at 13:30

## 2022-04-21 RX ADMIN — HYDRALAZINE HYDROCHLORIDE 50 MG: 50 TABLET, FILM COATED ORAL at 21:35

## 2022-04-21 RX ADMIN — GABAPENTIN 300 MG: 300 CAPSULE ORAL at 21:35

## 2022-04-21 RX ADMIN — FENTANYL CITRATE 25 MCG: 0.05 INJECTION, SOLUTION INTRAMUSCULAR; INTRAVENOUS at 15:53

## 2022-04-21 RX ADMIN — PROPOFOL 50 MG: 10 INJECTION, EMULSION INTRAVENOUS at 14:58

## 2022-04-21 RX ADMIN — PROPOFOL 50 MG: 10 INJECTION, EMULSION INTRAVENOUS at 14:53

## 2022-04-21 RX ADMIN — HYDROMORPHONE HYDROCHLORIDE 0.5 MG: 1 INJECTION, SOLUTION INTRAMUSCULAR; INTRAVENOUS; SUBCUTANEOUS at 16:15

## 2022-04-21 RX ADMIN — HYDROMORPHONE HYDROCHLORIDE 0.5 MG: 1 INJECTION, SOLUTION INTRAMUSCULAR; INTRAVENOUS; SUBCUTANEOUS at 16:24

## 2022-04-21 RX ADMIN — FENTANYL CITRATE 50 MCG: 50 INJECTION, SOLUTION INTRAMUSCULAR; INTRAVENOUS at 13:35

## 2022-04-21 RX ADMIN — SODIUM CHLORIDE, POTASSIUM CHLORIDE, SODIUM LACTATE AND CALCIUM CHLORIDE: 600; 310; 30; 20 INJECTION, SOLUTION INTRAVENOUS at 13:37

## 2022-04-21 RX ADMIN — ATORVASTATIN CALCIUM 10 MG: 10 TABLET, FILM COATED ORAL at 21:35

## 2022-04-21 RX ADMIN — PROPOFOL 50 MG: 10 INJECTION, EMULSION INTRAVENOUS at 13:39

## 2022-04-21 RX ADMIN — SODIUM CHLORIDE, POTASSIUM CHLORIDE, SODIUM LACTATE AND CALCIUM CHLORIDE 50 ML/HR: 600; 310; 30; 20 INJECTION, SOLUTION INTRAVENOUS at 16:58

## 2022-04-21 RX ADMIN — SODIUM CHLORIDE, PRESERVATIVE FREE 10 ML: 5 INJECTION INTRAVENOUS at 16:58

## 2022-04-21 RX ADMIN — CEFAZOLIN SODIUM 2 G: 1 INJECTION, POWDER, FOR SOLUTION INTRAMUSCULAR; INTRAVENOUS at 13:42

## 2022-04-21 RX ADMIN — SODIUM CHLORIDE, POTASSIUM CHLORIDE, SODIUM LACTATE AND CALCIUM CHLORIDE: 600; 310; 30; 20 INJECTION, SOLUTION INTRAVENOUS at 13:20

## 2022-04-21 RX ADMIN — SUCCINYLCHOLINE CHLORIDE 140 MG: 20 INJECTION, SOLUTION INTRAMUSCULAR; INTRAVENOUS at 13:30

## 2022-04-21 RX ADMIN — HYDRALAZINE HYDROCHLORIDE 50 MG: 50 TABLET, FILM COATED ORAL at 17:12

## 2022-04-21 RX ADMIN — FENTANYL CITRATE 50 MCG: 0.05 INJECTION, SOLUTION INTRAMUSCULAR; INTRAVENOUS at 15:59

## 2022-04-21 RX ADMIN — PHENYLEPHRINE HYDROCHLORIDE 150 MCG: 10 INJECTION INTRAVENOUS at 14:34

## 2022-04-21 NOTE — PROGRESS NOTES
Called ortega to let know about A1C 7.5. states that he is fine with proceeding with the surgery as long as  is.  Notified MD and states that he will address any issues after he is done with his current surgery

## 2022-04-21 NOTE — INTERVAL H&P NOTE
Update History & Physical    H&P update    Patient examined at the bedside preoperatively. Heart -regular rate and rhythm, S1/S2  Lungs -clear to auscultation bilaterally    No other changes to prior H&P. Procedure again discussed in detail, risks and benefits explained, postoperative considerations discussed. All questions answered.       Electronically signed by Travis Vickers MD on 4/21/2022 at 12:26 PM

## 2022-04-21 NOTE — OP NOTES
Operative Note    Patient: Dave Fernandez  YOB: 1955  MRN: [id number]    Date of Procedure: 4/21/2022     Pre-Op Diagnosis: HYPERPARATHYROIDISM    Post-Op Diagnosis: Same as preoperative diagnosis. Procedure(s):  PARATHYROIDECTOMY WITH FROZEN SECTION    Surgeon(s):  Anuel Jacome MD    Surgical Assistant: Surg Asst-1: Terrie Montanez    Anesthesia: General     Estimated Blood Loss (mL):  less than 50     Complications: None    Specimens:   ID Type Source Tests Collected by Time Destination   1 : right superior para thryroid  Frozen Section Thyroid  Anuel Jacome MD 4/21/2022 1436 Pathology        Implants: * No implants in log *    Drains: * No LDAs found *    Findings: Large nearly 3 cm right superior parathyroid adenoma    Indications: 28-year-old male presents with a longstanding history of hypercalcemia found to have primary hyperparathyroidism. He meets the criteria for surgery. Preoperative localizing studies reveals a likely right sided parathyroid adenoma. Detailed Description of Procedure:     Patient is brought to the operating room placed supine on the table. General endotracheal anesthesia was obtained and a timeout was performed. Nerve integrity monitor endotracheal tube was utilized for intraoperative recurrent laryngeal nerve monitoring. IV Ancef is given. The patient is positioned in the appropriate fashion for parathyroid surgery with a shoulder roll and neck extension. The anterior neck skin is prepped with alcohol and the proposed incision site is injected with 7 mL of 2% lidocaine with 1-100,000 parts epinephrine. The nerve monitor electrodes are inserted in the upper chest wall. The neck is then prepped with Betadine and draped in usual sterile fashion. Upon induction it is discovered that the rapid PTH machine in the laboratory is not properly calibrated and functioning for the purposes of this procedure.   We still clark a peripheral venous sample prior to incision and ran a PTH to be sent out. 15 blade is used to make a limited incision spanning the medial border of the sternocleidomastoid muscle within a natural neck skin crease in between the sternal notch and the thyroid notch. Incision is carried down through subcutaneous fat and to the platysma muscle. Dermal bleeders are cauterized. Subplatysmal flaps were then elevated superiorly and inferiorly using monopolar cautery. Flaps are secured to the drapes with 2-0 silk retention suture. Midline raphae between the strap muscles is then dissected using monopolar cautery and the LigaSure device until the thyroid capsule is encountered. Strap muscles were bluntly elevated off the right thyroid lobe retracted laterally. The thyroid lobe was then retracted medially and Kitner dissector was used to dissect away the loose tissues from the carotid sheath region away from the thyroid lobe. I then began carefully inspecting the paratracheal region. Just superior to the middle thyroid vein I could begin to see an enlarged of a extrathyroidal mass adjacent to the right superior pole which is consistent with the parathyroid adenoma noted on preoperative imaging. Inferior to this there is a normal-appearing right inferior parathyroid gland. In the plane between the 2 parathyroids I was able to identify the recurrent laryngeal nerve and now with the nerve in view he began to carefully dissect around the superior adenoma. LigaSure device was used to separate it from the thyroid medially and then careful blunt dissection is carried out superiorly and laterally to help  from surrounding soft tissues and began pulling out of the neck. Eventually bipolar cautery was used to divide any remaining attachments and the enlarged adenoma was sent for frozen section. Frozen section revealed an over 3 g parathyroid adenoma.   As there was a normal-appearing inferior parathyroid on the right side and due to the large obvious adenoma in the superior location I did not opt to perform a left-sided exploration. The paratracheal area was irrigated and suctioned and any bleeding was controlled with bipolar cautery. The paratracheal region overlying the nerve was covered with Gelfoam.  A 7 [place] YONNY drain was placed through an inferior stab incision and placed deep to the strap muscles. The strap muscles were reapproximated in the midline using a running 3-0 Vicryl. The platysma and deep dermal layer was closed with 3-0 Vicryl and 4-0 Vicryl. The skin was closed with 5-0 Monocryl subcuticular and Dermabond. YONNY drain was placed bulb suction and holding appropriately. Procedure was now completed. Prior to waking the patient up we clark 1 more serum PTH level to be labeled as the post excisional PTH. Patient was awoken extubated and brought to recovery room in satisfactory condition.     Electronically Signed by Everardo Castanon MD on 4/21/2022 at 3:02 PM

## 2022-04-21 NOTE — ANESTHESIA PREPROCEDURE EVALUATION
Relevant Problems   CARDIOVASCULAR   (+) Benign essential HTN      ENDOCRINE   (+) Type 2 diabetes mellitus with hyperglycemia, without long-term current use of insulin (HCC)       Anesthetic History   No history of anesthetic complications            Review of Systems / Medical History  Patient summary reviewed, nursing notes reviewed and pertinent labs reviewed    Pulmonary        Sleep apnea: CPAP           Neuro/Psych   Within defined limits           Cardiovascular    Hypertension          CAD and hyperlipidemia         GI/Hepatic/Renal  Within defined limits              Endo/Other  Within defined limits  Diabetes: type 2    Obesity     Other Findings   Comments: Procedure Information    Case: [id number] Date/Time: 04/21/22 1100  Procedure: PARATHYROIDECTOMY WITH FROZEN SECTION, INTRA-OPERATIVE PTH (N/A )  Anesthesia type: general  Pre-op diagnosis: HYPERPARATHYROIDISM  Location: [facility] MAIN OR 02 / [facility] MAIN OR  Surgeons: Marcus Salmeron MD    Medical History  Diabetes ([facility] [address]  Hypertension  CAD (coronary artery disease)  Sleep apnea         Physical Exam    Airway  Mallampati: II  TM Distance: 4 - 6 cm  Neck ROM: normal range of motion   Mouth opening: Normal     Cardiovascular    Rhythm: regular  Rate: normal         Dental  No notable dental hx       Pulmonary  Breath sounds clear to auscultation               Abdominal  GI exam deferred       Other Findings   Comments: Results for Leonardo Short (MRN [id number]) as of 4/21/2022 12:03    4/13/2022 10:00  Calcium: 10.5 (H)      Results for Leonardo Short (MRN [id number]) as of 4/21/2022 12:03    4/13/2022 10:00  WBC: 9.8  NRBC: 0.0  RBC: 4.21  HGB: 11.9 (L)  HCT: 35.9 (L)  MCV: 85.3  MCH: 28.3  MCHC: 33.1  RDW: 12.7  PLATELET: 808  MPV: 56.4  ABSOLUTE NRBC: 0.00         Anesthetic Plan    ASA: 3  Anesthesia type: general          Induction: Intravenous  Anesthetic plan and risks discussed with: Patient and Family Principal Discharge DX:	[place] infant of 40 completed weeks of gestation  Goal:	Stay Healthy  Instructions for follow-up, activity and diet:	- Follow-up with your pediatrician within 48 hours of discharge.     Routine Home Care Instructions:  - Please call us for help if you feel sad, blue or overwhelmed for more than a few days after discharge  - Umbilical cord care:        - Please keep your baby's cord clean and dry (do not apply alcohol)        - Please keep your baby's diaper below the umbilical cord until it has fallen off (~10-14 days)        - Please do not submerge your baby in a bath until the cord has fallen off (sponge bath instead)    - Continue feeding child at least every 3 hours, wake baby to feed if needed.     Please contact your pediatrician and return to the hospital if you notice any of the following:   - Fever  (T > 100.4)  - Reduced amount of wet diapers (< 5-6 per day) or no wet diaper in 12 hours  - Increased fussiness, irritability, or crying inconsolably  - Lethargy (excessively sleepy, difficult to arouse)  - Breathing difficulties (noisy breathing, breathing fast, using belly and neck muscles to breath)  - Changes in the baby’s color (yellow, blue, pale, gray)  - Seizure or loss of consciousness  Secondary Diagnosis:	Subgaleal hemorrhage

## 2022-04-21 NOTE — PROGRESS NOTES
Bedside shift change report given to Nato Lizama RN (oncoming nurse) by Nabil Welsh RN (offgoing nurse). Report included the following information SBAR.

## 2022-04-21 NOTE — ANESTHESIA POSTPROCEDURE EVALUATION
Procedure(s):  PARATHYROIDECTOMY WITH FROZEN SECTION, INTRA-OPERATIVE PTH.     general    Anesthesia Post Evaluation      Multimodal analgesia: multimodal analgesia used between 6 hours prior to anesthesia start to PACU discharge  Patient location during evaluation: PACU  Patient participation: complete - patient participated  Level of consciousness: awake  Pain score: 0  Pain management: adequate  Airway patency: patent  Anesthetic complications: no  Cardiovascular status: acceptable  Respiratory status: acceptable  Hydration status: acceptable  Post anesthesia nausea and vomiting:  controlled  Final Post Anesthesia Temperature Assessment:  Normothermia (36.0-37.5 degrees C)      INITIAL Post-op Vital signs:   Vitals Value Taken Time   /87 04/21/22 1545   Temp 36.5 °C (97.7 °F) 04/21/22 1519   Pulse 80 04/21/22 1545   Resp 19 04/21/22 1545   SpO2 99 % 04/21/22 1545

## 2022-04-22 VITALS
TEMPERATURE: 98.5 F | RESPIRATION RATE: 20 BRPM | DIASTOLIC BLOOD PRESSURE: 79 MMHG | BODY MASS INDEX: 34.55 KG/M2 | HEART RATE: 92 BPM | OXYGEN SATURATION: 96 % | WEIGHT: 215 LBS | HEIGHT: 66 IN | SYSTOLIC BLOOD PRESSURE: 139 MMHG

## 2022-04-22 LAB
CA-I BLD-MCNC: 8.9 MG/DL (ref 8.5–10.1)
GLUCOSE BLD STRIP.AUTO-MCNC: 260 MG/DL (ref 65–117)
PERFORMED BY, TECHID: ABNORMAL

## 2022-04-22 PROCEDURE — 74011250637 HC RX REV CODE- 250/637: Performed by: OTOLARYNGOLOGY

## 2022-04-22 PROCEDURE — 82962 GLUCOSE BLOOD TEST: CPT

## 2022-04-22 PROCEDURE — 74011250636 HC RX REV CODE- 250/636: Performed by: OTOLARYNGOLOGY

## 2022-04-22 PROCEDURE — 36415 COLL VENOUS BLD VENIPUNCTURE: CPT

## 2022-04-22 PROCEDURE — 74011000250 HC RX REV CODE- 250: Performed by: OTOLARYNGOLOGY

## 2022-04-22 PROCEDURE — 82310 ASSAY OF CALCIUM: CPT

## 2022-04-22 PROCEDURE — 74011636637 HC RX REV CODE- 636/637: Performed by: OTOLARYNGOLOGY

## 2022-04-22 RX ORDER — TRAMADOL HYDROCHLORIDE 50 MG/1
50 TABLET ORAL EVERY 12 HOURS
Qty: 30 TABLET | Refills: 0 | Status: SHIPPED | OUTPATIENT
Start: 2022-04-22 | End: 2022-05-06

## 2022-04-22 RX ADMIN — TRAMADOL HYDROCHLORIDE 50 MG: 50 TABLET, COATED ORAL at 09:57

## 2022-04-22 RX ADMIN — SODIUM CHLORIDE, POTASSIUM CHLORIDE, SODIUM LACTATE AND CALCIUM CHLORIDE 50 ML/HR: 600; 310; 30; 20 INJECTION, SOLUTION INTRAVENOUS at 05:25

## 2022-04-22 RX ADMIN — Medication 2000 UNITS: at 09:57

## 2022-04-22 RX ADMIN — LISINOPRIL 40 MG: 40 TABLET ORAL at 09:58

## 2022-04-22 RX ADMIN — INSULIN LISPRO 5 UNITS: 100 INJECTION, SOLUTION INTRAVENOUS; SUBCUTANEOUS at 07:30

## 2022-04-22 RX ADMIN — AMLODIPINE BESYLATE 10 MG: 5 TABLET ORAL at 09:59

## 2022-04-22 RX ADMIN — HYDRALAZINE HYDROCHLORIDE 50 MG: 50 TABLET, FILM COATED ORAL at 09:58

## 2022-04-22 RX ADMIN — ATENOLOL 25 MG: 25 TABLET ORAL at 09:58

## 2022-04-22 RX ADMIN — SODIUM CHLORIDE, PRESERVATIVE FREE 10 ML: 5 INJECTION INTRAVENOUS at 05:26

## 2022-04-22 NOTE — DISCHARGE SUMMARY
[facility] Progress Note    Patient: Moira Estes MRN: [id number]  SSN: [id number]    YOB: 1955  Age: 74 y.o. Sex: male      Admit Date: 4/21/2022    LOS: 0 days     Subjective:     Postoperative day 1 parathyroidectomy  Patient complains of minimal pain  Tolerating diet    Objective:     Vitals:    04/21/22 2302 04/22/22 0337 04/22/22 0705 04/22/22 0815   BP: (!) 143/79 (!) 141/76 139/79    Pulse: 96 100 92    Resp: 18 18 20   Temp: 98.3 °F (36.8 °C) 98 °F (36.7 °C) 98.5 °F (36.9 °C)    SpO2: 99% 98% 96%    Weight:       Height:            Intake and Output:  Current Shift: No intake/output data recorded. Last three shifts: 04/20 1901 - 04/22 0700  In: 2379.2 [P.O.:400;  I.V.:1979.2]  Out: 36 [Urine:700; Drains:55]    Physical Exam:     NAD, no stridor  Voice clear  Neck incision intact with dermabond  No erythema/ecchymosis  YONNY drain serosanguineous, removed      Lab/Data Review:    Recent Results (from the past 24 hour(s))   GLUCOSE, POC    Collection Time: 04/21/22 10:22 AM   Result Value Ref Range    Glucose (POC) 204 (H) 65 - 117 mg/dL    Performed by Max Quan, POC    Collection Time: 04/21/22  3:37 PM   Result Value Ref Range    Glucose (POC) 190 (H) 65 - 117 mg/dL    Performed by Cami Park    GLUCOSE, POC    Collection Time: 04/21/22  4:56 PM   Result Value Ref Range    Glucose (POC) 202 (H) 65 - 117 mg/dL    Performed by Kaylyn Damon    CALCIUM    Collection Time: 04/22/22  5:33 AM   Result Value Ref Range    Calcium 8.9 8.5 - 10.1 mg/dL   GLUCOSE, POC    Collection Time: 04/22/22  7:10 AM   Result Value Ref Range    Glucose (POC) 260 (H) 65 - 117 mg/dL    Performed by Aline RUSSELL             Assessment:     Active Problems:    Hyperparathyroidism ([address] (4/21/2022)        Plan:     Postoperative day 1 parathyroidectomy  Calcium this morning 8.9, down from 10.2  Doing well surgically  Plan DC home today    Signed By: Deni Jackson MD     April 22, 2022 Camron Garza, [address] [place] [address], [place] [address]

## 2022-04-22 NOTE — DISCHARGE INSTRUCTIONS
Patient Education        Parathyroidectomy: What to Expect at Home  Your Recovery     Parathyroidectomy is the removal of one or more of the four parathyroid glands in the neck. These small glands, located on the thyroid gland, help control the amount of calcium in the body. When they are too active, these glands cause high levels of calcium. This is called hyperparathyroidism (say \"hy-per-pair-ql-SYR-idge-iz-um\"). You may leave the hospital with stitches in the cut the doctor made (incision). Your doctor will tell you if you need to come back to have these removed. You may still have a tube called a drain in your neck. Your doctor will take this out a few days after your surgery. You may have some trouble chewing and swallowing after you go home. Your voice probably will be hoarse, and you may have trouble talking. For most people, these problems get better within a few weeks, but it can take longer. In some cases, this surgery causes permanent problems with chewing, speaking, or swallowing. This care sheet gives you a general idea about how long it will take for you to recover. But each person recovers at a different pace. Follow the steps below to get better as quickly as possible. How can you care for yourself at home? Activity    · Rest when you feel tired. Getting enough sleep will help you recover. When you lie down, put two or three pillows under your head to keep it raised.     · Try to walk each day. Start by walking a little more than you did the day before. Bit by bit, increase the amount you walk. Walking boosts blood flow and helps prevent pneumonia and constipation.     · Avoid strenuous physical activity and lifting heavy objects for 3 weeks after surgery or until your doctor says it is okay.     · Do not over-extend your neck backwards for 2 weeks after surgery.     · Ask your doctor when you can drive again.     · You may take a shower, unless you still have a drain. Pat the incision dry.  If you have a drain coming out of your incision, follow your doctor's instructions to care for it. Diet    · If swallowing is painful, start out with cold drinks, flavored ice pops, and ice cream. Next, try soft foods like pudding, yogurt, canned or cooked fruit, scrambled eggs, and mashed potatoes. Avoid eating hard or scratchy foods like chips or raw vegetables. Avoid orange or tomato juice and other acidic foods that can sting the throat.     · If you cough right after drinking, try drinking thicker liquids, such as a smoothie.     · You may notice that your bowel movements are not regular right after your surgery. This is common. Try to avoid constipation and straining with bowel movements. You may want to take a fiber supplement every day. If you have not had a bowel movement after a couple of days, ask your doctor about taking a mild laxative. Medicines    · Your doctor will tell you if and when you can restart your medicines. He or she will also give you instructions about taking any new medicines.     · If you take aspirin or some other blood thinner, ask your doctor if and when to start taking it again. Make sure that you understand exactly what your doctor wants you to do.     · Be safe with medicines. Take pain medicines exactly as directed. ? If the doctor gave you a prescription medicine for pain, take it as prescribed. ? If you are not taking a prescription pain medicine, ask your doctor if you can take an over-the-counter medicine.     · If you think your pain medicine is making you sick to your stomach:  ? Take your medicine after meals (unless your doctor has told you not to). ? Ask your doctor for a different pain medicine.     · Your doctor may prescribe calcium to prevent problems after surgery from low calcium. Not having enough calcium can cause symptoms such as tingling around your mouth or in your hands and feet.     · Your doctor may have prescribed antibiotics. Take them as directed.  Do not stop taking them just because you feel better. You need to take the full course of antibiotics. Incision care    · If your doctor told you how to care for your incision, follow your doctor's instructions. If you did not get instructions, follow this general advice:  ? After the first 24 to 48 hours, wash around the incision with clean water 2 times a day. Don't use hydrogen peroxide or alcohol, which can slow healing.     · You may have a drain near your incision. Your doctor will tell you how to take care of it. Follow-up care is a key part of your treatment and safety. Be sure to make and go to all appointments, and call your doctor if you are having problems. It's also a good idea to know your test results and keep a list of the medicines you take. When should you call for help? Call 911 anytime you think you may need emergency care. For example, call if:    · You passed out (lost consciousness).     · You have sudden chest pain and shortness of breath, or you cough up blood.     · You have severe trouble breathing. Call your doctor now or seek immediate medical care if:    · You have loose stitches, or your incision comes open.     · You have blood leaking from your incision.     · You have signs of infection, such as:  ? Increased pain, swelling, warmth, or redness. ? Red streaks leading from the incision. ? Pus draining from the incision. ? A fever.     · You have a tingling feeling around your mouth.     · You have cramping or tingling in your hands and feet. Watch closely for changes in your health, and be sure to contact your doctor if:    · You have trouble talking.     · You are sick to your stomach or cannot keep fluids down.     · You do not have a bowel movement after taking a laxative. Where can you learn more? Go to http://www.gray.com/  Enter M010 in the search box to learn more about \"Parathyroidectomy: What to Expect at Home. \"  Current as of: July 28, 2021               Content Version: 13.2  © 2237-7698 [employer], [employer]. Care instructions adapted under license by [employer] (which disclaims liability or warranty for this information). If you have questions about a medical condition or this instruction, always ask your healthcare professional. [address] any warranty or liability for your use of this information.

## 2022-04-22 NOTE — PROGRESS NOTES
Medicare Outpatient Observation Notice (MOON)/ [place] Outpatient Observation Notice ([place]) provided to patient/representative with verbal explanation of the notice. Time allotted for questions regarding the notice. Patient /representative provided a completed copy of the MOON/VOON notice. Copy placed on bedside chart. Patient to discharge home self care. Discharge plan of care/case management plan validated with provider discharge order.

## 2022-04-25 LAB — PTH-INTACT SERPL-MCNC: 20 PG/ML (ref 12–88)

## 2022-04-27 ENCOUNTER — OFFICE VISIT (OUTPATIENT)
Dept: ENT CLINIC | Age: 67
End: 2022-04-27
Payer: MEDICARE

## 2022-04-27 VITALS
OXYGEN SATURATION: 98 % | SYSTOLIC BLOOD PRESSURE: 152 MMHG | RESPIRATION RATE: 18 BRPM | WEIGHT: 215 LBS | BODY MASS INDEX: 34.55 KG/M2 | DIASTOLIC BLOOD PRESSURE: 90 MMHG | HEART RATE: 78 BPM | HEIGHT: 66 IN

## 2022-04-27 DIAGNOSIS — E21.3 HYPERPARATHYROIDISM (HCC): Primary | ICD-10-CM

## 2022-04-27 DIAGNOSIS — E83.52 HYPERCALCEMIA: ICD-10-CM

## 2022-04-27 PROCEDURE — 99024 POSTOP FOLLOW-UP VISIT: CPT | Performed by: OTOLARYNGOLOGY

## 2022-04-27 NOTE — PROGRESS NOTES
Patient: Darshan Flores  YOB: 1955  MRN: [id number]  Date of Service: 4/27/2022    Chief Complaint: Hyperparathyroidism    History of Present Illness: Darshan Flores is a 74y.o. year old male who presents today accompanied by daughter for discussion of    Referred by Dr. Rob Barron, endocrinology    Reports right hip pain for 2 years  Saw PCP In June and found to have elevated calcium and intermittent abnormal PTH  Has had no imaging    Denies difficulty swallowing, voice changes   +fatigue, muscle cramping    Per endo note: Onset: 1 year back     Symptoms of hypercalcemia: Constipation, fatigue. Denies any polyuria, polydipsia, stomach pain, kidney stones, bone fractures, episodes of confusion. :  Calcium slightly elevated at 10.8 (8.6-10.2)    11-:  Ionized calcium borderline elevated at 5.7 (4.5-5.6)     1-:  Calcium elevated at 11.1 (8.6-10. 2)     5-:  Calcium elevated at 10.7 (8.6-10. 2)     5-:  Ionized calcium slightly elevated at 5.8 (4.5-5.6)  PTH normal at 46 (15-65)  Renal function normal.    9-:  Calcium borderline elevated at 10.5 (8.6-10.2)  PTH elevated at 67 (15-65)   It appears that patient has primary hyper parathyroidism. Currently calcium elevation is mild, may be because of vitamin D deficiency. Optimize vitamin D replacement with vitamin D 2000 units twice a day and repeated labs.    11-:  Calcium borderline elevated at 10.4 (8.6-10.2)  PTH at the upper end of normal range at 61     10-7-2021 bone density scan:  Lumbar spine T score -2.8  Right femoral neck T score -2.3  Right hip total T score -1.5  Left femoral neck T score -1.4  Left hip total T score -0.5  Plan:  Primary hyperparathyroidism. Although calcium elevation is mild, patient has osteoporosis and he meets criteria for surgery. He will need treatment for osteoporosis after surgery. Referring patient to ENT. I will see him back in 2 months.   Continue to stay off calcium tablets, Tums, hydrochlorothiazide.       Family Hx: mother with parathyroid    1/28/22 - pt follows up today to discuss results of US and PTH scan    4/27/2022 -6 days postop parathyroidectomy. Doing well no complaints      Past Medical History:  Past Medical History:   Diagnosis Date    CAD (coronary artery disease)     pt denies    Diabetes ([address]     Hypertension     Sleep apnea     CPAP       Past Surgical History:   Past Surgical History:   Procedure Laterality Date    HX CATARACT REMOVAL Bilateral     HX COLONOSCOPY         Medications:   Current Outpatient Medications   Medication Instructions    amLODIPine (NORVASC) 10 mg, Oral, DAILY    Bydureon BCise 2 mg/0.85 mL atIn No dose, route, or frequency recorded.  cholecalciferol (vitamin D3) (VITAMIN D3) 2,000 Units, Oral, DAILY    dapagliflozin (FARXIGA) 10 mg, Oral, DAILY    gabapentin (NEURONTIN) 300 mg, Oral, 2 TIMES DAILY    hydrALAZINE (APRESOLINE) 50 mg, Oral, 3 TIMES DAILY    mupirocin (BACTROBAN) 2 % ointment Apply small amount to both nostrils twice daily for 14 days    nebivoloL (BYSTOLIC) 20 mg, Oral, DAILY    semaglutide (OZEMPIC SC) SubCUTAneous    simvastatin (ZOCOR) 20 mg, Oral, EVERY BEDTIME    SITagliptin-metFORMIN (Janumet) 50-1,000 mg per tablet 1 Tablet, Oral, 2 TIMES DAILY WITH MEALS    traMADoL (ULTRAM) 50 mg, Oral, EVERY 12 HOURS       Allergies:    Allergies   Allergen Reactions    Cortisone Anaphylaxis       Social History:   Social History     Tobacco Use    Smoking status: Never Smoker    Smokeless tobacco: Never Used   Vaping Use    Vaping Use: Never used   Substance Use Topics    Alcohol use: Never    Drug use: Never        Family History:  Family History   Problem Relation Age of Onset    Diabetes Mother          Physical Examination:   Vitals:    04/27/22 0853   BP: (!) 152/90   BP 1 Location: Left upper arm   BP Patient Position: Sitting   BP Cuff Size: Adult   Pulse: 78   Resp: 18   Height: 5' 6\" (1.676 m)   Weight: 215 lb (97.5 kg)   SpO2: 98%      NAD, no stridor  Voice clear  Neck incision intact with dermabond, healing well  Minimal/moderate incisional edema  No erythema/ecchymosis    Pathology showing a 3200 mg parathyroid adenoma, right side  Postop labs showing PTH of 20 and calcium 8.9      Assessment and Plan:   1. Hyperparathyroidism   2. Hypercalcemia  3. Osteoporosis    Doing well postop parathyroidectomy. So far looks to have successful reduction in PTH and calcium. Further wound care discussed. Okay to return to work May 16. Follow-up with me in 3 weeks. He wants his ears checked at next visit.

## 2022-04-27 NOTE — LETTER
4/27/2022    Patient: Cyn Ospina   YOB: 1955   Date of Visit: 4/27/2022     Milan Lyles [facility] [address] [address]  Via Fax: [phone]     Tona Rodriguez MD  [address] [address]  Via In Basket    Dear Ruth Laird, Mychal Elizabeth MD,      Thank you for referring Mr. Sandra Jacome to [facility] [address] for evaluation. My notes for this consultation are attached. If you have questions, please do not hesitate to call me. I look forward to following your patient along with you.       Sincerely,    Geo Law MD

## 2022-05-03 ENCOUNTER — OFFICE VISIT (OUTPATIENT)
Dept: ENDOCRINOLOGY | Age: 67
End: 2022-05-03
Payer: MEDICARE

## 2022-05-03 VITALS
SYSTOLIC BLOOD PRESSURE: 142 MMHG | WEIGHT: 214.8 LBS | DIASTOLIC BLOOD PRESSURE: 82 MMHG | HEIGHT: 66 IN | BODY MASS INDEX: 34.52 KG/M2 | HEART RATE: 75 BPM | OXYGEN SATURATION: 98 % | TEMPERATURE: 98.4 F

## 2022-05-03 DIAGNOSIS — M81.8 OTHER OSTEOPOROSIS WITHOUT CURRENT PATHOLOGICAL FRACTURE: ICD-10-CM

## 2022-05-03 DIAGNOSIS — E55.9 VITAMIN D DEFICIENCY: ICD-10-CM

## 2022-05-03 DIAGNOSIS — E21.0 PRIMARY HYPERPARATHYROIDISM (HCC): Primary | ICD-10-CM

## 2022-05-03 PROCEDURE — G8754 DIAS BP LESS 90: HCPCS | Performed by: INTERNAL MEDICINE

## 2022-05-03 PROCEDURE — G8417 CALC BMI ABV UP PARAM F/U: HCPCS | Performed by: INTERNAL MEDICINE

## 2022-05-03 PROCEDURE — G8427 DOCREV CUR MEDS BY ELIG CLIN: HCPCS | Performed by: INTERNAL MEDICINE

## 2022-05-03 PROCEDURE — G8510 SCR DEP NEG, NO PLAN REQD: HCPCS | Performed by: INTERNAL MEDICINE

## 2022-05-03 PROCEDURE — G8753 SYS BP > OR = 140: HCPCS | Performed by: INTERNAL MEDICINE

## 2022-05-03 PROCEDURE — G8536 NO DOC ELDER MAL SCRN: HCPCS | Performed by: INTERNAL MEDICINE

## 2022-05-03 PROCEDURE — 1101F PT FALLS ASSESS-DOCD LE1/YR: CPT | Performed by: INTERNAL MEDICINE

## 2022-05-03 PROCEDURE — 99214 OFFICE O/P EST MOD 30 MIN: CPT | Performed by: INTERNAL MEDICINE

## 2022-05-03 PROCEDURE — 3017F COLORECTAL CA SCREEN DOC REV: CPT | Performed by: INTERNAL MEDICINE

## 2022-05-03 RX ORDER — CHOLECALCIFEROL (VITAMIN D3) 125 MCG
2000 CAPSULE ORAL DAILY
Qty: 90 TABLET | Refills: 3 | Status: SHIPPED | OUTPATIENT
Start: 2022-05-03 | End: 2022-08-01

## 2022-05-03 RX ORDER — ALBUTEROL SULFATE 90 UG/1
AEROSOL, METERED RESPIRATORY (INHALATION)
COMMUNITY
Start: 2022-04-14

## 2022-05-03 RX ORDER — HYDRALAZINE HYDROCHLORIDE 50 MG/1
TABLET, FILM COATED ORAL
COMMUNITY
Start: 2022-04-28 | End: 2022-05-12

## 2022-05-03 RX ORDER — SIMVASTATIN 20 MG/1
TABLET, FILM COATED ORAL
COMMUNITY
Start: 2022-02-04 | End: 2022-09-16

## 2022-05-03 RX ORDER — SEMAGLUTIDE 1.34 MG/ML
INJECTION, SOLUTION SUBCUTANEOUS
COMMUNITY
Start: 2022-03-10 | End: 2022-09-16

## 2022-05-03 NOTE — PROGRESS NOTES
History and Physical    Patient: Sia Qureshi MRN: [id number]  SSN: [id number]    YOB: 1955  Age: 74 y.o. Sex: male      Subjective:      Sia Qureshi is a 74 y.o. male with past medical history of type 2 diabetes mellitus, hypertension, coronary artery disease, hyperlipidemia is here for follow-up of hypercalcemia. He was sent to me by primary care provider Randy Singer NP     at the initial visit we stopped lisinopril-hydrochlorothiazide and switched him to just lisinopril 40 mg daily. After the initial biochemical evaluation it was determined that patient has primary hyperparathyroidism and he meets criteria for surgery because of osteoporosis. He was referred to ENT surgery. He had parathyroidectomy on 4-. He tolerated the procedure well. Denies any tingling in fingertips, perioral paresthesia or muscle spasms. He continues to take vitamin D 2000 units every day. He has not seen dentist in a long time. Denies any loose or bothersome teeth. Onset: 1 year back    Symptoms of hypercalcemia: Constipation, fatigue. Denies any polyuria, polydipsia, stomach pain, kidney stones, bone fractures, episodes of confusion.     Lithium/ HCTZ: Patient is taking lisinopril-hydrochlorothiazide 20-12.5 mg daily for hypertension, he has been on this for the past 7 years or more    Calcium supplement: No calcium tablets, no Tums, no significant dairy intake    Vit D supplement: He was on vitamin D 50,000 units once a week which was stopped a few months back    Osteoporosis/fragility fracture: None    Dental issues: No current dental issues    Family history of hypercalcemia/hyperparathyroidism/osteoporosis: None to his knowledge    Past Medical History:   Diagnosis Date    CAD (coronary artery disease)     pt denies    Diabetes [facility]     Hypertension     Sleep apnea     CPAP     Past Surgical History:   Procedure Laterality Date    HX CATARACT REMOVAL Bilateral     HX COLONOSCOPY      [place] ENDOCRINE SURG PARATHYROID PROC UNLISTED        Family History   Problem Relation Age of Onset    Diabetes Mother      Social History     Tobacco Use    Smoking status: Never Smoker    Smokeless tobacco: Never Used   Substance Use Topics    Alcohol use: Never      Prior to Admission medications    Medication Sig Start Date End Date Taking? Authorizing Provider   albuterol (PROVENTIL HFA, VENTOLIN HFA, PROAIR HFA) 90 mcg/actuation inhaler TAKE 2 INHALATIONS UP TO 4X A DAY AS NEEDED FOR SHORTNESS OF BREATH. 4/14/22  Yes Provider, Historical   hydrALAZINE (APRESOLINE) 50 mg tablet  4/28/22  Yes Provider, Historical   Ozempic 0.25 mg or 0.5 mg/dose (2 mg/1.5 ml) subq pen  3/10/22  Yes Provider, Historical   simvastatin (ZOCOR) 20 mg tablet  2/4/22  Yes Provider, Historical   cholecalciferol, vitamin D3, (Vitamin D3) 50 mcg (2,000 unit) tab Take 1 Tablet by mouth daily for 90 days. 5/3/22 8/1/22 Yes Macario Mejia MD   traMADoL (ULTRAM) 50 mg tablet Take 1 Tablet by mouth every twelve (12) hours for 14 days. Max Daily Amount: 100 mg. 4/22/22 5/6/22 Yes Bonnie Hyde MD   mupirocin (BACTROBAN) 2 % ointment Apply small amount to both nostrils twice daily for 14 days 3/17/22  Yes Bonnie Hyde MD   gabapentin (NEURONTIN) 300 mg capsule Take 300 mg by mouth two (2) times a day. 9/16/21  Yes Provider, Historical   Bydureon BCise 2 mg/0.85 mL atIn  8/27/21  Yes Provider, Historical   nebivoloL (Bystolic) 20 mg tablet Take 20 mg by mouth daily. Yes Other, MD Clinton   amLODIPine (NORVASC) 10 mg tablet Take 10 mg by mouth daily. Yes Other, MD Clinton   SITagliptin-metFORMIN (Janumet) 50-1,000 mg per tablet Take 1 Tab by mouth two (2) times daily (with meals). Yes Other, MD Clinton   dapagliflozin (Farxiga) 10 mg tab tablet Take 10 mg by mouth daily.    Yes Other, MD Clinton        Allergies   Allergen Reactions    Cortisone Anaphylaxis       Review of Systems:  ROS    A comprehensive review of systems was preformed and it is negative except mentioned in HPI    Objective:     Vitals:    05/03/22 1236 05/03/22 1243   BP: (!) 159/86 (!) 142/82   Pulse: 75    Temp: 98.4 °F (36.9 °C)    TempSrc: Temporal    SpO2: 98%    Weight: 214 lb 12.8 oz (97.4 kg)    Height: 5' 6\" (1.676 m)         Physical Exam:    Physical Exam  Vitals and nursing note reviewed. Constitutional:       Appearance: Normal appearance. HENT:      Head: Normocephalic and atraumatic. Mouth/Throat:      Comments: Does not have a lot of teeth but I do not appreciate any dental infections on my exam  Cardiovascular:      Rate and Rhythm: Normal rate and regular rhythm. Pulmonary:      Effort: Pulmonary effort is normal.      Breath sounds: Normal breath sounds. Neurological:      Mental Status: He is alert.           Labs and Imaging:    Last 3 Recorded Weights in this Encounter    05/03/22 1236   Weight: 214 lb 12.8 oz (97.4 kg)        Lab Results   Component Value Date/Time    Hemoglobin A1c 7.5 (H) 04/13/2022 10:00 AM        Assessment:     Patient Active Problem List   Diagnosis Code    Hypercalcemia E83.52    Benign essential HTN I10    Type 2 diabetes mellitus with hyperglycemia, without long-term current use of insulin (HCC) E11.65    Mixed hyperlipidemia E78.2    Hyperparathyroidism ([address] E21.3    Primary hyperparathyroidism ([address] E21.0    Vitamin D deficiency E55.9    Other osteoporosis without current pathological fracture M81.8           Plan:     Hypercalcemia/primary hyperparathyroidism:  :  Calcium slightly elevated at 10.8 (8.6-10.2)  25 hydroxy vitamin D low at 15.1    11-:  Ionized calcium borderline elevated at 5.7 (4.5-5.6)  Magnesium lower end of normal range at 1.6  Phosphorus lower end of normal range at 3 (2.8-4.1)  25 hydroxy vitamin D low at 17.8    1-:  Calcium elevated at 11.1 (8.6-10.2)    3-:  Normal hemoglobin and hematocrit    5-:  Calcium elevated at 10.7 (8.6-10.2)    5-:  25 hydroxy vitamin D low at 16  Ionized calcium slightly elevated at 5.8 (4.5-5.6)  PTH normal at 46 (15-65)  Renal function normal.    Patient is on lisinopril-hydrochlorothiazide 20-12.5 mg daily    Switched to plain lisinopril    9-:  Calcium borderline elevated at 10.5 (8.6-10.2)  Phosphorus normal at 3  Magnesium normal at 1.8  PTH elevated at 67 (15-65)  25 hydroxy vitamin D low at 18.5  1, 25 dihydroxy vitamin D normal at 46.7 (19.9-79. 3)    It appears that patient has primary hyper parathyroidism. Currently calcium elevation is mild, may be because of vitamin D deficiency. Optimize vitamin D replacement with vitamin D 2000 units twice a day and repeated labs. 11-:  25 hydroxy vitamin D normal at 33.6  Calcium borderline elevated at 10.4 (8.6-10.2)  PTH at the upper end of normal range at 61  24-hour urine calcium normal at 186 (0-320)    10-7-2021 bone density scan:  Lumbar spine T score -2.8  Right femoral neck T score -2.3  Right hip total T score -1.5  Left femoral neck T score -1.4  Left hip total T score -0.5    Patient has Primary hyperparathyroidism. Although calcium elevation is mild, patient has osteoporosis and he meets criteria for surgery. Patient had parathyroidectomy on 4-. Plan:  Check CMP, PTH and phosphorus today. Osteoporosis:  10-7-2021 bone density scan:  Lumbar spine T score -2.8  Right femoral neck T score -2.3  Right hip total T score -1.5  Left femoral neck T score -1.4  Left hip total T score -0.5  Plan:  Discussed treatment options with patient. Discussed about alendronate versus Reclast infusion. Patient will think about it and let me know. Check labs today. Consume 2 servings of dairy every day for bone health, continue vitamin D 2000 units every day. Weightbearing exercises as tolerated. Avoid falls.     Vitamin D deficiency:  25 hydroxy vitamin D low at 16 on 5-  Agree with avoiding high-dose vitamin D replacement  Start gentle replacement with cholecalciferol 2000 units daily    9-:  25 hydroxy vitamin D low at 18.5  Increase vitamin D to 2000 units twice a day. 11-:  25 hydroxy vitamin D normal at 33.3  Vitamin D decreased from 2000 units twice a day to 2000 units once a day    3-:  25 hydroxy vitamin D borderline low at 28.2  Plan:  Continue vitamin D 2000 units every day. Mixed hyperlipidemia:  Currently on simvastatin. Being managed by PCP and cardiologist.    Essential hypertension:  Blood pressure elevated today but much better than before. Continue current medications. Orders Placed This Encounter    METABOLIC PANEL, COMPREHENSIVE    PTH INTACT    PHOSPHORUS    cholecalciferol, vitamin D3, (Vitamin D3) 50 mcg (2,000 unit) tab     Sig: Take 1 Tablet by mouth daily for 90 days.      Dispense:  90 Tablet     Refill:  3        Signed By: Trey Harrington MD     May 3, 2022      Return to clinic 3 months

## 2022-05-03 NOTE — LETTER
5/3/2022    Patient: Jacqlyn Curling   YOB: 1955   Date of Visit: 5/3/2022     Milan Gonzales [facility] [address] [address]  Via Fax: [phone]    Dear TANVI Gonzales,      Thank you for referring Mr. Perla Zaragoza to [address] [place] [address] for evaluation. My notes for this consultation are attached. If you have questions, please do not hesitate to call me. I look forward to following your patient along with you.       Sincerely,    Mary Jack MD

## 2022-05-03 NOTE — PATIENT INSTRUCTIONS
Continue vitamin D 2000 units everyday. Drink 1 galss of milk a day. Treatment for osteoporosis: Fosamax tablet once a week or Reclast infusion once a year.

## 2022-05-04 LAB
ALBUMIN SERPL-MCNC: 4.1 G/DL (ref 3.8–4.8)
ALBUMIN/GLOB SERPL: 1.1 {RATIO} (ref 1.2–2.2)
ALP SERPL-CCNC: 109 IU/L (ref 44–121)
ALT SERPL-CCNC: 31 IU/L (ref 0–44)
AST SERPL-CCNC: 30 IU/L (ref 0–40)
BILIRUB SERPL-MCNC: 0.3 MG/DL (ref 0–1.2)
BUN SERPL-MCNC: 7 MG/DL (ref 8–27)
BUN/CREAT SERPL: 7 (ref 10–24)
CALCIUM SERPL-MCNC: 9 MG/DL (ref 8.6–10.2)
CHLORIDE SERPL-SCNC: 104 MMOL/L (ref 96–106)
CO2 SERPL-SCNC: 23 MMOL/L (ref 20–29)
CREAT SERPL-MCNC: 0.97 MG/DL (ref 0.76–1.27)
EGFR: 87 ML/MIN/1.73
GLOBULIN SER CALC-MCNC: 3.6 G/DL (ref 1.5–4.5)
GLUCOSE SERPL-MCNC: 235 MG/DL (ref 65–99)
PHOSPHATE SERPL-MCNC: 3.4 MG/DL (ref 2.8–4.1)
POTASSIUM SERPL-SCNC: 4.2 MMOL/L (ref 3.5–5.2)
PROT SERPL-MCNC: 7.7 G/DL (ref 6–8.5)
PTH-INTACT SERPL-MCNC: 50 PG/ML (ref 15–65)
SODIUM SERPL-SCNC: 143 MMOL/L (ref 134–144)

## 2022-05-05 NOTE — PROGRESS NOTES
Spoke to patient that labs are looking good. Asked whether he has made decision between alendronate and Reclast.  He has not discussed with his family yet. He will call back in a few days.

## 2022-05-06 ENCOUNTER — TELEPHONE (OUTPATIENT)
Dept: ENDOCRINOLOGY | Age: 67
End: 2022-05-06

## 2022-05-06 NOTE — TELEPHONE ENCOUNTER
LVM for pt to call office back to get him scheduled to have Reclast infusion done at [facility]

## 2022-05-06 NOTE — TELEPHONE ENCOUNTER
Patient was returning your call about what was discussed through a prior phone conversation. He also stated that he wanted to go with the shot verses the pill.

## 2022-05-06 NOTE — TELEPHONE ENCOUNTER
Please fax Reclast infusion form to [facility] same-day surgery and help to make appointment for patient.

## 2022-05-12 ENCOUNTER — HOSPITAL ENCOUNTER (OUTPATIENT)
Dept: INFUSION THERAPY | Age: 67
Discharge: HOME | End: 2022-05-12
Payer: MEDICARE

## 2022-05-12 VITALS
DIASTOLIC BLOOD PRESSURE: 79 MMHG | TEMPERATURE: 97.9 F | HEIGHT: 65 IN | OXYGEN SATURATION: 99 % | WEIGHT: 214 LBS | HEART RATE: 81 BPM | BODY MASS INDEX: 35.65 KG/M2 | SYSTOLIC BLOOD PRESSURE: 149 MMHG | RESPIRATION RATE: 18 BRPM

## 2022-05-12 PROCEDURE — 96374 THER/PROPH/DIAG INJ IV PUSH: CPT

## 2022-05-12 PROCEDURE — 74011250636 HC RX REV CODE- 250/636: Performed by: INTERNAL MEDICINE

## 2022-05-12 RX ORDER — HYDRALAZINE HYDROCHLORIDE 100 MG/1
100 TABLET, FILM COATED ORAL 2 TIMES DAILY
COMMUNITY

## 2022-05-12 RX ORDER — ASPIRIN 81 MG/1
81 TABLET ORAL DAILY
COMMUNITY

## 2022-05-12 RX ORDER — GABAPENTIN 300 MG/1
300 CAPSULE ORAL
COMMUNITY

## 2022-05-12 RX ORDER — ZOLEDRONIC ACID 5 MG/100ML
5 INJECTION, SOLUTION INTRAVENOUS
Status: COMPLETED | OUTPATIENT
Start: 2022-05-12 | End: 2022-05-12

## 2022-05-12 RX ADMIN — ZOLEDRONIC ACID 5 MG: 5 INJECTION, SOLUTION INTRAVENOUS at 12:07

## 2022-05-12 NOTE — PROGRESS NOTES
Patient alert and oriented x 4 with respirations even and unlabored on RA with no signs of distress noted. Bedside face to face report given to [facility] for continuation of care.

## 2022-05-14 ENCOUNTER — APPOINTMENT (OUTPATIENT)
Dept: CT IMAGING | Age: 67
End: 2022-05-14
Attending: FAMILY MEDICINE
Payer: MEDICARE

## 2022-05-14 ENCOUNTER — HOSPITAL ENCOUNTER (EMERGENCY)
Age: 67
Discharge: HOME | End: 2022-05-14
Attending: FAMILY MEDICINE
Payer: MEDICARE

## 2022-05-14 VITALS
WEIGHT: 214 LBS | HEART RATE: 84 BPM | RESPIRATION RATE: 18 BRPM | BODY MASS INDEX: 35.65 KG/M2 | HEIGHT: 65 IN | SYSTOLIC BLOOD PRESSURE: 130 MMHG | OXYGEN SATURATION: 95 % | TEMPERATURE: 99.5 F | DIASTOLIC BLOOD PRESSURE: 67 MMHG

## 2022-05-14 DIAGNOSIS — R00.2 PALPITATIONS: Primary | ICD-10-CM

## 2022-05-14 LAB
ALBUMIN SERPL-MCNC: 3.4 G/DL (ref 3.5–5)
ALBUMIN/GLOB SERPL: 0.8 {RATIO} (ref 1.1–2.2)
ALP SERPL-CCNC: 87 U/L (ref 45–117)
ALT SERPL-CCNC: 47 U/L (ref 12–78)
ANION GAP SERPL CALC-SCNC: 10 MMOL/L (ref 5–15)
AST SERPL W P-5'-P-CCNC: 30 U/L (ref 15–37)
ATRIAL RATE: 105 BPM
BASOPHILS # BLD: 0 K/UL (ref 0–0.1)
BASOPHILS NFR BLD: 0 % (ref 0–1)
BILIRUB SERPL-MCNC: 0.4 MG/DL (ref 0.2–1)
BNP SERPL-MCNC: 25 PG/ML
BUN SERPL-MCNC: 7 MG/DL (ref 6–20)
BUN/CREAT SERPL: 6 (ref 12–20)
CA-I BLD-MCNC: 8.4 MG/DL (ref 8.5–10.1)
CALCULATED P AXIS, ECG09: 51 DEGREES
CALCULATED R AXIS, ECG10: -16 DEGREES
CALCULATED T AXIS, ECG11: 38 DEGREES
CHLORIDE SERPL-SCNC: 105 MMOL/L (ref 97–108)
CO2 SERPL-SCNC: 25 MMOL/L (ref 21–32)
CREAT SERPL-MCNC: 1.27 MG/DL (ref 0.7–1.3)
DIAGNOSIS, 93000: NORMAL
DIFFERENTIAL METHOD BLD: ABNORMAL
EOSINOPHIL # BLD: 0.1 K/UL (ref 0–0.4)
EOSINOPHIL NFR BLD: 1 % (ref 0–7)
ERYTHROCYTE [DISTWIDTH] IN BLOOD BY AUTOMATED COUNT: 12.7 % (ref 11.5–14.5)
FLUAV AG NPH QL IA: NEGATIVE
FLUBV AG NOSE QL IA: NEGATIVE
GLOBULIN SER CALC-MCNC: 4.4 G/DL (ref 2–4)
GLUCOSE SERPL-MCNC: 232 MG/DL (ref 65–100)
HCT VFR BLD AUTO: 33.1 % (ref 36.6–50.3)
HGB BLD-MCNC: 11.1 G/DL (ref 12.1–17)
IMM GRANULOCYTES # BLD AUTO: 0 K/UL (ref 0–0.04)
IMM GRANULOCYTES NFR BLD AUTO: 0 % (ref 0–0.5)
LYMPHOCYTES # BLD: 1.3 K/UL (ref 0.8–3.5)
LYMPHOCYTES NFR BLD: 12 % (ref 12–49)
MCH RBC QN AUTO: 28.8 PG (ref 26–34)
MCHC RBC AUTO-ENTMCNC: 33.5 G/DL (ref 30–36.5)
MCV RBC AUTO: 86 FL (ref 80–99)
MONOCYTES # BLD: 1 K/UL (ref 0–1)
MONOCYTES NFR BLD: 9 % (ref 5–13)
NEUTS SEG # BLD: 8.1 K/UL (ref 1.8–8)
NEUTS SEG NFR BLD: 78 % (ref 32–75)
P-R INTERVAL, ECG05: 178 MS
PLATELET # BLD AUTO: 223 K/UL (ref 150–400)
PMV BLD AUTO: 10.2 FL (ref 8.9–12.9)
POTASSIUM SERPL-SCNC: 3.6 MMOL/L (ref 3.5–5.1)
PROT SERPL-MCNC: 7.8 G/DL (ref 6.4–8.2)
Q-T INTERVAL, ECG07: 346 MS
QRS DURATION, ECG06: 86 MS
QTC CALCULATION (BEZET), ECG08: 457 MS
RBC # BLD AUTO: 3.85 M/UL (ref 4.1–5.7)
SODIUM SERPL-SCNC: 140 MMOL/L (ref 136–145)
TROPONIN-HIGH SENSITIVITY: 11 NG/L (ref 0–76)
TROPONIN-HIGH SENSITIVITY: 11 NG/L (ref 0–76)
VENTRICULAR RATE, ECG03: 105 BPM
WBC # BLD AUTO: 10.5 K/UL (ref 4.1–11.1)

## 2022-05-14 PROCEDURE — 83880 ASSAY OF NATRIURETIC PEPTIDE: CPT

## 2022-05-14 PROCEDURE — 36415 COLL VENOUS BLD VENIPUNCTURE: CPT

## 2022-05-14 PROCEDURE — 80053 COMPREHEN METABOLIC PANEL: CPT

## 2022-05-14 PROCEDURE — 99285 EMERGENCY DEPT VISIT HI MDM: CPT

## 2022-05-14 PROCEDURE — 74011000636 HC RX REV CODE- 636: Performed by: FAMILY MEDICINE

## 2022-05-14 PROCEDURE — 84484 ASSAY OF TROPONIN QUANT: CPT

## 2022-05-14 PROCEDURE — 85025 COMPLETE CBC W/AUTO DIFF WBC: CPT

## 2022-05-14 PROCEDURE — 74011250637 HC RX REV CODE- 250/637: Performed by: FAMILY MEDICINE

## 2022-05-14 PROCEDURE — 93005 ELECTROCARDIOGRAM TRACING: CPT

## 2022-05-14 PROCEDURE — 87804 INFLUENZA ASSAY W/OPTIC: CPT

## 2022-05-14 PROCEDURE — 71275 CT ANGIOGRAPHY CHEST: CPT

## 2022-05-14 RX ORDER — ACETAMINOPHEN 500 MG
1000 TABLET ORAL ONCE
Status: COMPLETED | OUTPATIENT
Start: 2022-05-14 | End: 2022-05-14

## 2022-05-14 RX ADMIN — IOPAMIDOL 100 ML: 755 INJECTION, SOLUTION INTRAVENOUS at 02:28

## 2022-05-14 RX ADMIN — ACETAMINOPHEN 1000 MG: 500 TABLET ORAL at 01:03

## 2022-05-14 NOTE — ED TRIAGE NOTES
Pt complaining of \"fast heart rate\" since yesterday, accompanied by swelling in the feet and fatigue. Denies any chest pain.

## 2022-05-14 NOTE — Clinical Note
[facility] EMERGENCY DEPARTMENT  [address] [facility] [address] [phone]    Work/School Note    Date: 5/14/2022    To Whom It May concern:      Morales Theodore was seen and treated today in the emergency room by the following provider(s):  Attending Provider: Soila Roberts DO. Cyn Ospina is excused from work/school on 05/14/22. He is clear to return to work/school on 05/15/22.         Sincerely,          Vega Guevara DO

## 2022-05-14 NOTE — ED PROVIDER NOTES
EMERGENCY DEPARTMENT HISTORY AND PHYSICAL EXAM      Date: 5/14/2022  Patient Name: Thomas Kaplan    History of Presenting Illness     Chief Complaint   Patient presents with    Palpitations       History Provided By:     HPI: Thomas Kaplan, is an extremely pleasant 74 y.o. male presenting to the ED with a chief complaint of palpitations. Patient states he has been working very hard today and felt like he was having palpitations that he characterizes as fast heartbeat since this morning. No chest pain or shortness of breath. No arm pain or jaw pain. No nausea vomiting or diaphoresis. Denies other complaints at this time. There are no other complaints, changes, or physical findings at this time. PCP: TANVI Camacho    No current facility-administered medications on file prior to encounter. Current Outpatient Medications on File Prior to Encounter   Medication Sig Dispense Refill    gabapentin (NEURONTIN) 300 mg capsule Take 300 mg by mouth nightly.  hydrALAZINE (APRESOLINE) 100 mg tablet Take 100 mg by mouth two (2) times a day.  aspirin delayed-release 81 mg tablet Take 81 mg by mouth daily.  albuterol (PROVENTIL HFA, VENTOLIN HFA, PROAIR HFA) 90 mcg/actuation inhaler TAKE 2 INHALATIONS UP TO 4X A DAY AS NEEDED FOR SHORTNESS OF BREATH.      Ozempic 0.25 mg or 0.5 mg/dose (2 mg/1.5 ml) subq pen       simvastatin (ZOCOR) 20 mg tablet       cholecalciferol, vitamin D3, (Vitamin D3) 50 mcg (2,000 unit) tab Take 1 Tablet by mouth daily for 90 days. 90 Tablet 3    Bydureon BCise 2 mg/0.85 mL atIn       nebivoloL (Bystolic) 20 mg tablet Take 20 mg by mouth daily.  amLODIPine (NORVASC) 10 mg tablet Take 10 mg by mouth daily.  SITagliptin-metFORMIN (Janumet) 50-1,000 mg per tablet Take 1 Tab by mouth two (2) times daily (with meals).  dapagliflozin (Farxiga) 10 mg tab tablet Take 10 mg by mouth daily.          Past History     Past Medical History:  Past Medical History:   Diagnosis Date    CAD (coronary artery disease)     pt denies    Diabetes ([address]     Hypertension     Sleep apnea     CPAP       Past Surgical History:  Past Surgical History:   Procedure Laterality Date    HX CATARACT REMOVAL Bilateral     HX COLONOSCOPY      [place] ENDOCRINE SURG PARATHYROID PROC UNLISTED         Family History:  Family History   Problem Relation Age of Onset    Diabetes Mother        Social History:  Social History     Tobacco Use    Smoking status: Never Smoker    Smokeless tobacco: Never Used   Vaping Use    Vaping Use: Never used   Substance Use Topics    Alcohol use: Never    Drug use: Never       Allergies: Allergies   Allergen Reactions    Cortisone Anaphylaxis         Review of Systems     Review of Systems   Constitutional: Negative for activity change, appetite change, chills, fatigue and fever. HENT: Negative for congestion and sore throat. Eyes: Negative for photophobia and visual disturbance. Respiratory: Negative for cough, shortness of breath and wheezing. Cardiovascular: Positive for palpitations. Negative for chest pain and leg swelling. Gastrointestinal: Negative for abdominal pain, diarrhea, nausea and vomiting. Endocrine: Negative for cold intolerance and heat intolerance. Musculoskeletal: Negative for gait problem and joint swelling. Skin: Negative for color change and rash. Neurological: Negative for dizziness and headaches. Physical Exam     Physical Exam  Constitutional:       General: He is not in acute distress. Appearance: Normal appearance. He is not toxic-appearing. HENT:      Head: Normocephalic and atraumatic. Right Ear: External ear normal.      Left Ear: External ear normal.      Mouth/Throat:      Mouth: Mucous membranes are moist.      Pharynx: Oropharynx is clear. Eyes:      Extraocular Movements: Extraocular movements intact.       Conjunctiva/sclera: Conjunctivae normal.   Cardiovascular: Rate and Rhythm: Regular rhythm. Tachycardia present. Pulses: Normal pulses. Heart sounds: Normal heart sounds. Pulmonary:      Effort: Pulmonary effort is normal. No respiratory distress. Breath sounds: Normal breath sounds. No wheezing, rhonchi or rales. Abdominal:      General: There is no distension. Palpations: Abdomen is soft. Tenderness: There is no abdominal tenderness. There is no guarding or rebound. Musculoskeletal:         General: No deformity. Cervical back: Normal range of motion and neck supple. Right lower leg: No edema. Left lower leg: No edema. Skin:     Capillary Refill: Capillary refill takes less than 2 seconds. Findings: No erythema or rash. Neurological:      General: No focal deficit present. Mental Status: He is alert and oriented to person, place, and time.       Gait: Gait normal.   Psychiatric:         Mood and Affect: Mood normal.         Behavior: Behavior normal.         Lab and Diagnostic Study Results     Labs -     Recent Results (from the past 12 hour(s))   EKG, 12 LEAD, INITIAL    Collection Time: 05/14/22 12:36 AM   Result Value Ref Range    Ventricular Rate 105 BPM    Atrial Rate 105 BPM    P-R Interval 178 ms    QRS Duration 86 ms    Q-T Interval 346 ms    QTC Calculation (Bezet) 457 ms    Calculated P Axis 51 degrees    Calculated R Axis -16 degrees    Calculated T Axis 38 degrees    Diagnosis       Sinus tachycardia  Otherwise normal ECG  When compared with ECG of 15-MAR-2022 14:35,  QT has lengthened     CBC WITH AUTOMATED DIFF    Collection Time: 05/14/22 12:45 AM   Result Value Ref Range    WBC 10.5 4.1 - 11.1 K/uL    RBC 3.85 (L) 4.10 - 5.70 M/uL    HGB 11.1 (L) 12.1 - 17.0 g/dL    HCT 33.1 (L) 36.6 - 50.3 %    MCV 86.0 80.0 - 99.0 FL    MCH 28.8 26.0 - 34.0 PG    MCHC 33.5 30.0 - 36.5 g/dL    RDW 12.7 11.5 - 14.5 %    PLATELET 242 106 - 938 K/uL    MPV 10.2 8.9 - 12.9 FL    NEUTROPHILS 78 (H) 32 - 75 % LYMPHOCYTES 12 12 - 49 %    MONOCYTES 9 5 - 13 %    EOSINOPHILS 1 0 - 7 %    BASOPHILS 0 0 - 1 %    IMMATURE GRANULOCYTES 0 0.0 - 0.5 %    ABS. NEUTROPHILS 8.1 (H) 1.8 - 8.0 K/UL    ABS. LYMPHOCYTES 1.3 0.8 - 3.5 K/UL    ABS. MONOCYTES 1.0 0.0 - 1.0 K/UL    ABS. EOSINOPHILS 0.1 0.0 - 0.4 K/UL    ABS. BASOPHILS 0.0 0.0 - 0.1 K/UL    ABS. IMM. GRANS. 0.0 0.00 - 0.04 K/UL    DF AUTOMATED     METABOLIC PANEL, COMPREHENSIVE    Collection Time: 05/14/22 12:45 AM   Result Value Ref Range    Sodium 140 136 - 145 mmol/L    Potassium 3.6 3.5 - 5.1 mmol/L    Chloride 105 97 - 108 mmol/L    CO2 25 21 - 32 mmol/L    Anion gap 10 5 - 15 mmol/L    Glucose 232 (H) 65 - 100 mg/dL    BUN 7 6 - 20 mg/dL    Creatinine 1.27 0.70 - 1.30 mg/dL    BUN/Creatinine ratio 6 (L) 12 - 20      GFR est AA >60 >60 ml/min/1.73m2    GFR est non-AA 57 (L) >60 ml/min/1.73m2    Calcium 8.4 (L) 8.5 - 10.1 mg/dL    Bilirubin, total 0.4 0.2 - 1.0 mg/dL    AST (SGOT) 30 15 - 37 U/L    ALT (SGPT) 47 12 - 78 U/L    Alk. phosphatase 87 45 - 117 U/L    Protein, total 7.8 6.4 - 8.2 g/dL    Albumin 3.4 (L) 3.5 - 5.0 g/dL    Globulin 4.4 (H) 2.0 - 4.0 g/dL    A-G Ratio 0.8 (L) 1.1 - 2.2     TROPONIN-HIGH SENSITIVITY    Collection Time: 05/14/22 12:45 AM   Result Value Ref Range    Troponin-High Sensitivity 11 0 - 76 ng/L   NT-PRO BNP    Collection Time: 05/14/22 12:45 AM   Result Value Ref Range    NT pro-BNP 25 <125 pg/mL   INFLUENZA A & B AG (RAPID TEST)    Collection Time: 05/14/22  1:00 AM   Result Value Ref Range    Influenza A Antigen Negative Negative      Influenza B Antigen Negative Negative     TROPONIN-HIGH SENSITIVITY    Collection Time: 05/14/22  2:45 AM   Result Value Ref Range    Troponin-High Sensitivity 11 0 - 76 ng/L       Radiologic Studies -   @lastxrresult@  CT Results  (Last 48 hours)               05/14/22 0227  CTA CHEST W OR W WO CONT Final result    Impression:  Negative for an acute pulmonary embolus.        Narrative:  Study: Chest CTA Clinical Indication: Palpitations, tachycardia, evaluate for PE. Comparison: None available. Technique: Routine volume acquisition of chest was performed following the   intravenous administration of 100 mL Isovue-370. Images were optimized for   pulmonary arterial opacification. Coronal and sagittal reconstructions well as   MIP reformations were generated and reviewed. Dose reduction: All CT scans at   this facility are performed using dose reduction optimization techniques as   appropriate to a performed exam including the following-automated exposure   control, adjustments of mA and/or Kv according to patient size, or use of   iterative reconstructive technique. Findings:       Vessels: The main pulmonary artery is normal in caliber. No intraluminal filling   defect to suggest acute pulmonary embolus. The thoracic aorta is normal in size. Cardiac/Mediastinum: The heart is normal in size. No large pericardial effusion. Nondistended esophagus. Lungs/Pleura: The central airways are clear. No focal consolidation, pleural   effusion, or pneumothorax. Thyroid gland: Unremarkable. Lymph nodes: No enlarged mediastinal or hilar lymph nodes. Upper abdomen: Unremarkable. Bones/Chest wall: Multilevel thoracic spondylosis. CXR Results  (Last 48 hours)    None            Medical Decision Making   - I am the first provider for this patient. - I reviewed the vital signs, available nursing notes, past medical history, past surgical history, family history and social history. - Initial assessment performed. The patients presenting problems have been discussed, and they are in agreement with the care plan formulated and outlined with them. I have encouraged them to ask questions as they arise throughout their visit. Vital Signs-Reviewed the patient's vital signs.   Patient Vitals for the past 12 hrs:   Temp Pulse Resp BP SpO2   05/14/22 0411 -- 84 18 130/67 95 %   05/14/22 0244 -- 89 18 (!) 163/85 97 %   05/14/22 0102 -- 96 20 (!) 153/81 98 %   05/14/22 0035 99.5 °F (37.5 °C) (!) 105 20 (!) 172/87 97 %         ED Course/ Provider Notes (Medical Decision Making):     Patient presented to the emergency department with a chief complaint of palpitations. On examination the patient is nontoxic and well-appearing. Vitals were reviewed per above. EKG sinus tachycardia heart rate 105, no STEMI. Tachycardia resolves while in the emergency department. High-sensitivity troponin 11, repeat unchanged. No significant electrolyte abnormalities. Patient hyperglycemic but not acidotic. He has known diabetes. CTA chest negative. Upon reexamining patient he is feeling much better. Will have patient follow-up with cardiology for further evaluation of palpitations. Patient discharged home in stable and ambulatory condition. Tracey De Los Santos was given a thorough list of signs and symptoms that would warrant an immediate return to the emergency department. Otherwise Tracey De Los Santos will follow up with PCP. Procedures   Medical Decision Makingedical Decision Making  Performed by: Kristine Lozano,   Procedures  None       Disposition   Disposition:     Home     All of the diagnostic tests were reviewed and questions answered. Diagnosis, care plan and treatment options were discussed. The patient understands the instructions and will follow up as directed. The patients results have been reviewed with them. They have been counseled regarding their diagnosis. The patient verbally convey understanding and agreement of the signs, symptoms, diagnosis, treatment and prognosis and additionally agrees to follow up as recommended with their PCP in 24 - 48 hours. They also agree with the care-plan and convey that all of their questions have been answered.   I have also put together some discharge instructions for them that include: 1) educational information regarding their diagnosis, 2) how to care for their diagnosis at home, as well a 3) list of reasons why they would want to return to the ED prior to their follow-up appointment, should their condition change. DISCHARGE PLAN:    1. Cannot display discharge medications since this patient is not currently admitted. 2.   Follow-up Information     Follow up With Specialties Details Why Contact Info    Your primary care doctor  Schedule an appointment as soon as possible for a visit in 2 days      [facility] [address] Card[facility] [address]   [address]  [address] [facility]  [phone]            3. Return to ED if worse       4. Discharge Medication List as of 5/14/2022  4:21 AM            Diagnosis     Clinical Impression:    1. Palpitations        Attestations:    Kareem Ramirez, DO    Please note that this dictation was completed with [employer], the computer voice recognition software. Quite often unanticipated grammatical, syntax, homophones, and other interpretive errors are inadvertently transcribed by the computer software. Please disregard these errors. Please excuse any errors that have escaped final proofreading. Thank you.

## 2022-05-14 NOTE — DISCHARGE INSTRUCTIONS
Thank you! Thank you for allowing me to care for you in the emergency department. I sincerely hope that you are satisfied with your visit today. It is my goal to provide you with excellent care. Below you will find a list of your labs and imaging from your visit today. Should you have any questions regarding these results please do not hesitate to call the emergency department. Labs -     Recent Results (from the past 12 hour(s))   EKG, 12 LEAD, INITIAL    Collection Time: 05/14/22 12:36 AM   Result Value Ref Range    Ventricular Rate 105 BPM    Atrial Rate 105 BPM    P-R Interval 178 ms    QRS Duration 86 ms    Q-T Interval 346 ms    QTC Calculation (Bezet) 457 ms    Calculated P Axis 51 degrees    Calculated R Axis -16 degrees    Calculated T Axis 38 degrees    Diagnosis       Sinus tachycardia  Otherwise normal ECG  When compared with ECG of 15-MAR-2022 14:35,  QT has lengthened     CBC WITH AUTOMATED DIFF    Collection Time: 05/14/22 12:45 AM   Result Value Ref Range    WBC 10.5 4.1 - 11.1 K/uL    RBC 3.85 (L) 4.10 - 5.70 M/uL    HGB 11.1 (L) 12.1 - 17.0 g/dL    HCT 33.1 (L) 36.6 - 50.3 %    MCV 86.0 80.0 - 99.0 FL    MCH 28.8 26.0 - 34.0 PG    MCHC 33.5 30.0 - 36.5 g/dL    RDW 12.7 11.5 - 14.5 %    PLATELET 161 801 - 372 K/uL    MPV 10.2 8.9 - 12.9 FL    NEUTROPHILS 78 (H) 32 - 75 %    LYMPHOCYTES 12 12 - 49 %    MONOCYTES 9 5 - 13 %    EOSINOPHILS 1 0 - 7 %    BASOPHILS 0 0 - 1 %    IMMATURE GRANULOCYTES 0 0.0 - 0.5 %    ABS. NEUTROPHILS 8.1 (H) 1.8 - 8.0 K/UL    ABS. LYMPHOCYTES 1.3 0.8 - 3.5 K/UL    ABS. MONOCYTES 1.0 0.0 - 1.0 K/UL    ABS. EOSINOPHILS 0.1 0.0 - 0.4 K/UL    ABS. BASOPHILS 0.0 0.0 - 0.1 K/UL    ABS. IMM.  GRANS. 0.0 0.00 - 0.04 K/UL    DF AUTOMATED     METABOLIC PANEL, COMPREHENSIVE    Collection Time: 05/14/22 12:45 AM   Result Value Ref Range    Sodium 140 136 - 145 mmol/L    Potassium 3.6 3.5 - 5.1 mmol/L    Chloride 105 97 - 108 mmol/L    CO2 25 21 - 32 mmol/L    Anion gap 10 5 - 15 mmol/L    Glucose 232 (H) 65 - 100 mg/dL    BUN 7 6 - 20 mg/dL    Creatinine 1.27 0.70 - 1.30 mg/dL    BUN/Creatinine ratio 6 (L) 12 - 20      GFR est AA >60 >60 ml/min/1.73m2    GFR est non-AA 57 (L) >60 ml/min/1.73m2    Calcium 8.4 (L) 8.5 - 10.1 mg/dL    Bilirubin, total 0.4 0.2 - 1.0 mg/dL    AST (SGOT) 30 15 - 37 U/L    ALT (SGPT) 47 12 - 78 U/L    Alk. phosphatase 87 45 - 117 U/L    Protein, total 7.8 6.4 - 8.2 g/dL    Albumin 3.4 (L) 3.5 - 5.0 g/dL    Globulin 4.4 (H) 2.0 - 4.0 g/dL    A-G Ratio 0.8 (L) 1.1 - 2.2     TROPONIN-HIGH SENSITIVITY    Collection Time: 05/14/22 12:45 AM   Result Value Ref Range    Troponin-High Sensitivity 11 0 - 76 ng/L   NT-PRO BNP    Collection Time: 05/14/22 12:45 AM   Result Value Ref Range    NT pro-BNP 25 <125 pg/mL   INFLUENZA A & B AG (RAPID TEST)    Collection Time: 05/14/22  1:00 AM   Result Value Ref Range    Influenza A Antigen Negative Negative      Influenza B Antigen Negative Negative     TROPONIN-HIGH SENSITIVITY    Collection Time: 05/14/22  2:45 AM   Result Value Ref Range    Troponin-High Sensitivity 11 0 - 76 ng/L       Radiologic Studies -   CTA CHEST W OR W WO CONT   Final Result   Negative for an acute pulmonary embolus. CT Results  (Last 48 hours)                 05/14/22 0227  CTA CHEST W OR W WO CONT Final result    Impression:  Negative for an acute pulmonary embolus. Narrative:  Study: Chest CTA       Clinical Indication: Palpitations, tachycardia, evaluate for PE. Comparison: None available. Technique: Routine volume acquisition of chest was performed following the   intravenous administration of 100 mL Isovue-370. Images were optimized for   pulmonary arterial opacification. Coronal and sagittal reconstructions well as   MIP reformations were generated and reviewed.  Dose reduction: All CT scans at   this facility are performed using dose reduction optimization techniques as   appropriate to a performed exam including the following-automated exposure   control, adjustments of mA and/or Kv according to patient size, or use of   iterative reconstructive technique. Findings:       Vessels: The main pulmonary artery is normal in caliber. No intraluminal filling   defect to suggest acute pulmonary embolus. The thoracic aorta is normal in size. Cardiac/Mediastinum: The heart is normal in size. No large pericardial effusion. Nondistended esophagus. Lungs/Pleura: The central airways are clear. No focal consolidation, pleural   effusion, or pneumothorax. Thyroid gland: Unremarkable. Lymph nodes: No enlarged mediastinal or hilar lymph nodes. Upper abdomen: Unremarkable. Bones/Chest wall: Multilevel thoracic spondylosis. CXR Results  (Last 48 hours)      None               If you feel that you have not received excellent quality care or timely care, please ask to speak to the nurse manager. Please choose us in the future for your continued health care needs. ------------------------------------------------------------------------------------------------------------  The exam and treatment you received in the Emergency Department were for an urgent problem and are not intended as complete care. It is important that you follow-up with a doctor, nurse practitioner, or physician assistant to:  (1) confirm your diagnosis,  (2) re-evaluation of changes in your illness and treatment, and  (3) for ongoing care. If your symptoms become worse or you do not improve as expected and you are unable to reach your usual health care provider, you should return to the Emergency Department. We are available 24 hours a day. Please take your discharge instructions with you when you go to your follow-up appointment. If you have any problem arranging a follow-up appointment, contact the Emergency Department immediately.     If a prescription has been provided, please have it filled as soon as possible to prevent a delay in treatment. Read the entire medication instruction sheet provided to you by the pharmacy. If you have any questions or reservations about taking the medication due to side effects or interactions with other medications, please call your primary care physician or contact the ER to speak with the charge nurse. Make an appointment with your family doctor or the physician you were referred to for follow-up of this visit as instructed on your discharge paperwork, as this is a mandatory follow-up. Return to the ER if you are unable to be seen or if you are unable to be seen in a timely manner. If you have any problem arranging the follow-up visit, contact the Emergency Department immediately.

## 2022-05-23 ENCOUNTER — TRANSCRIBE ORDER (OUTPATIENT)
Dept: SCHEDULING | Age: 67
End: 2022-05-23

## 2022-05-23 DIAGNOSIS — I25.10 CAD (CORONARY ARTERY DISEASE): Primary | ICD-10-CM

## 2022-05-25 ENCOUNTER — OFFICE VISIT (OUTPATIENT)
Dept: ENT CLINIC | Age: 67
End: 2022-05-25
Payer: MEDICARE

## 2022-05-25 ENCOUNTER — OFFICE VISIT (OUTPATIENT)
Dept: ENT CLINIC | Age: 67
End: 2022-05-25

## 2022-05-25 VITALS
WEIGHT: 214 LBS | DIASTOLIC BLOOD PRESSURE: 82 MMHG | SYSTOLIC BLOOD PRESSURE: 136 MMHG | OXYGEN SATURATION: 98 % | RESPIRATION RATE: 18 BRPM | HEART RATE: 86 BPM | BODY MASS INDEX: 35.65 KG/M2 | HEIGHT: 65 IN

## 2022-05-25 DIAGNOSIS — E83.52 HYPERCALCEMIA: ICD-10-CM

## 2022-05-25 DIAGNOSIS — H90.3 SENSORINEURAL HEARING LOSS (SNHL) OF BOTH EARS: Primary | ICD-10-CM

## 2022-05-25 DIAGNOSIS — E21.3 HYPERPARATHYROIDISM (HCC): ICD-10-CM

## 2022-05-25 DIAGNOSIS — H61.23 BILATERAL IMPACTED CERUMEN: ICD-10-CM

## 2022-05-25 PROCEDURE — G8752 SYS BP LESS 140: HCPCS | Performed by: OTOLARYNGOLOGY

## 2022-05-25 PROCEDURE — G8427 DOCREV CUR MEDS BY ELIG CLIN: HCPCS | Performed by: OTOLARYNGOLOGY

## 2022-05-25 PROCEDURE — G8754 DIAS BP LESS 90: HCPCS | Performed by: OTOLARYNGOLOGY

## 2022-05-25 PROCEDURE — G8536 NO DOC ELDER MAL SCRN: HCPCS | Performed by: OTOLARYNGOLOGY

## 2022-05-25 PROCEDURE — 69210 REMOVE IMPACTED EAR WAX UNI: CPT | Performed by: OTOLARYNGOLOGY

## 2022-05-25 PROCEDURE — 1123F ACP DISCUSS/DSCN MKR DOCD: CPT | Performed by: OTOLARYNGOLOGY

## 2022-05-25 PROCEDURE — 1101F PT FALLS ASSESS-DOCD LE1/YR: CPT | Performed by: OTOLARYNGOLOGY

## 2022-05-25 PROCEDURE — 92557 COMPREHENSIVE HEARING TEST: CPT | Performed by: AUDIOLOGIST

## 2022-05-25 PROCEDURE — G8417 CALC BMI ABV UP PARAM F/U: HCPCS | Performed by: OTOLARYNGOLOGY

## 2022-05-25 PROCEDURE — G8510 SCR DEP NEG, NO PLAN REQD: HCPCS | Performed by: OTOLARYNGOLOGY

## 2022-05-25 PROCEDURE — 3017F COLORECTAL CA SCREEN DOC REV: CPT | Performed by: OTOLARYNGOLOGY

## 2022-05-25 PROCEDURE — 99214 OFFICE O/P EST MOD 30 MIN: CPT | Performed by: OTOLARYNGOLOGY

## 2022-05-25 NOTE — PROGRESS NOTES
Marisa Nunez, a 74y.o. year old male, was seen in ENT clinic today for a hearing evaluation on referral from Dr. Stuart Pena. Patient complains of hearing loss. He currently wears binaural custom [place] Unitron hearing aids which he believes he got through [facility] (patient unclear on name or provider). He reports that due to COVID-19 he has not been seen for a hearing aid check in approximately 2 years (when he purchased the aids). He believes his last audiogram was approximately 4 years ago. He reports that he feels his hearing may have changed and his hearing aids are not working well. Otoscopy: normal external ear canals and visible tympanic membranes, bilaterally. Tympanometry: CNT    SRT: RE Speech Reception Threshold (SRT) was obtained at 45 dBHL LE Speech Reception Threshold (SRT) was obtained at 45 dBHL    WRS: RE Excellent in quiet when words were presented at 85 dBHL. WRS: LE Good in quiet when words were presented at 85 dBHL. Pure tone audiometry:  RE: Moderate sloping to severe sensorineural hearing loss  LE: Mild sloping to profound sensorineural hearing loss    Sensorineural hearing loss, bilaterally    Impressions:  hearing loss requiring medical/otologic and audiologic follow-up  Discussed results of today's testing with patient. It is likely that his hearing loss has worsened in 4 years which may be partly causing his difficulty with the hearing aids. Recommended returning to hearing aid provider for cleaning/checking and reprogramming with updated audiogram. Also offered patient the option to transfer care here. Provided copy of audiogram to patient today. Plan:  Follow-up with ENT.   Repeat audiogram upon request.    Eliezer Maxwell   Doctor of Audiology

## 2022-05-25 NOTE — LETTER
5/25/2022    Patient: Kate Campbell   YOB: 1955   Date of Visit: 5/25/2022     Milan Holley [address] [place] [id number]  Via Fax: [phone]    Dear TANVI Holley,      Thank you for referring Mr. Declan Dunlap to [facility] [address] for evaluation. My notes for this consultation are attached. If you have questions, please do not hesitate to call me. I look forward to following your patient along with you.       Sincerely,    Fili George MD

## 2022-05-25 NOTE — PROGRESS NOTES
Patient: Kala Stark  YOB: 1955  MRN: [id number]  Date of Service: 5/25/2022    Chief Complaint: Hyperparathyroidism    History of Present Illness: Kala Stark is a 74y.o. year old male who presents today accompanied by daughter for discussion of    Referred by Dr. Rashi Landeros, endocrinology    Reports right hip pain for 2 years  Saw PCP In June and found to have elevated calcium and intermittent abnormal PTH  Has had no imaging    Denies difficulty swallowing, voice changes   +fatigue, muscle cramping    Per endo note: Onset: 1 year back     Symptoms of hypercalcemia: Constipation, fatigue. Denies any polyuria, polydipsia, stomach pain, kidney stones, bone fractures, episodes of confusion. :  Calcium slightly elevated at 10.8 (8.6-10.2)    11-:  Ionized calcium borderline elevated at 5.7 (4.5-5.6)     1-:  Calcium elevated at 11.1 (8.6-10. 2)     5-:  Calcium elevated at 10.7 (8.6-10. 2)     5-:  Ionized calcium slightly elevated at 5.8 (4.5-5.6)  PTH normal at 46 (15-65)  Renal function normal.    9-:  Calcium borderline elevated at 10.5 (8.6-10.2)  PTH elevated at 67 (15-65)   It appears that patient has primary hyper parathyroidism. Currently calcium elevation is mild, may be because of vitamin D deficiency. Optimize vitamin D replacement with vitamin D 2000 units twice a day and repeated labs.    11-:  Calcium borderline elevated at 10.4 (8.6-10.2)  PTH at the upper end of normal range at 61     10-7-2021 bone density scan:  Lumbar spine T score -2.8  Right femoral neck T score -2.3  Right hip total T score -1.5  Left femoral neck T score -1.4  Left hip total T score -0.5  Plan:  Primary hyperparathyroidism. Although calcium elevation is mild, patient has osteoporosis and he meets criteria for surgery. He will need treatment for osteoporosis after surgery. Referring patient to ENT. I will see him back in 2 months.   Continue to stay off calcium tablets, Tums, hydrochlorothiazide.       Family Hx: mother with parathyroid    1/28/22 - pt follows up today to discuss results of US and PTH scan    4/27/2022 -6 days postop parathyroidectomy. Doing well no complaints    5/25/2022 -1 month follow-up. Still states he is doing well just little bit of sensitivity at the incision site. He had blood work from endocrinologist earlier this month. He went to the ER 10 days ago for rapid heart rate. He is supposedly having some cardiac testing done later this summer. He states he has just not regained his energy yet since after surgery. Here for hearing testing as well. Review of Systems   Constitutional: Negative for chills and fever. HENT: Positive for hearing loss. Negative for ear pain, nosebleeds and tinnitus. Eyes: Negative for blurred vision and double vision. Respiratory: Positive for shortness of breath. Negative for cough and sputum production. Cardiovascular: Positive for palpitations. Negative for chest pain. Gastrointestinal: Negative for heartburn, nausea and vomiting. Musculoskeletal: Positive for joint pain. Negative for neck pain. Skin: Negative. Neurological: Negative for dizziness, speech change, weakness and headaches. Endo/Heme/Allergies: Negative for environmental allergies. Does not bruise/bleed easily. Psychiatric/Behavioral: Negative for memory loss. The patient does not have insomnia.           Past Medical History:  Past Medical History:   Diagnosis Date    CAD (coronary artery disease)     pt denies    Diabetes ([address]     Hypertension     Sleep apnea     CPAP       Past Surgical History:   Past Surgical History:   Procedure Laterality Date    HX CATARACT REMOVAL Bilateral     HX COLONOSCOPY      NE ENDOCRINE SURG PARATHYROID PROC UNLISTED         Medications:   Current Outpatient Medications   Medication Instructions    albuterol (PROVENTIL HFA, VENTOLIN HFA, PROAIR HFA) 90 mcg/actuation inhaler TAKE 2 INHALATIONS UP TO 4X A DAY AS NEEDED FOR SHORTNESS OF BREATH.  amLODIPine (NORVASC) 10 mg, Oral, DAILY    aspirin delayed-release 81 mg, Oral, DAILY    Bydureon BCise 2 mg/0.85 mL atIn No dose, route, or frequency recorded.  cholecalciferol (vitamin D3) (VITAMIN D3) 2,000 Units, Oral, DAILY    dapagliflozin (FARXIGA) 10 mg, Oral, DAILY    gabapentin (NEURONTIN) 300 mg, Oral, EVERY BEDTIME    hydrALAZINE (APRESOLINE) 100 mg, Oral, 2 TIMES DAILY    nebivoloL (BYSTOLIC) 20 mg, Oral, DAILY    Ozempic 0.25 mg or 0.5 mg/dose (2 mg/1.5 ml) subq pen No dose, route, or frequency recorded.  simvastatin (ZOCOR) 20 mg tablet No dose, route, or frequency recorded.  SITagliptin-metFORMIN (Janumet) 50-1,000 mg per tablet 1 Tablet, Oral, 2 TIMES DAILY WITH MEALS       Allergies: Allergies   Allergen Reactions    Cortisone Anaphylaxis       Social History:   Social History     Tobacco Use    Smoking status: Never Smoker    Smokeless tobacco: Never Used   Vaping Use    Vaping Use: Never used   Substance Use Topics    Alcohol use: Never    Drug use: Never        Family History:  Family History   Problem Relation Age of Onset    Diabetes Mother          Physical Examination:   Vitals:    05/25/22 0842   BP: 136/82   BP 1 Location: Left upper arm   BP Patient Position: Sitting   BP Cuff Size: Adult   Pulse: 86   Resp: 18   Height: 5' 5\" (1.651 m)   Weight: 214 lb (97.1 kg)   SpO2: 98%      Physical Exam  Vitals reviewed. Constitutional:       General: He is awake. Appearance: Normal appearance. He is normal weight. HENT:      Head: Normocephalic and atraumatic. Jaw: There is normal jaw occlusion. No trismus, tenderness or malocclusion. Salivary Glands: Right salivary gland is not diffusely enlarged or tender. Left salivary gland is not diffusely enlarged or tender. Right Ear: Tympanic membrane, ear canal and external ear normal. Decreased hearing noted.  There is impacted cerumen. Left Ear: Tympanic membrane, ear canal and external ear normal. Decreased hearing noted. There is impacted cerumen. Ears:      Dewey exam findings: does not lateralize. Right Rinne: AC > BC. Left Rinne: AC > BC. Comments: Hearing aids in place     Nose: No septal deviation, mucosal edema or rhinorrhea. Right Turbinates: Not enlarged, swollen or pale. Left Turbinates: Not enlarged, swollen or pale. Right Sinus: No maxillary sinus tenderness or frontal sinus tenderness. Left Sinus: No maxillary sinus tenderness or frontal sinus tenderness. Mouth/Throat:      Lips: Pink. Mouth: Mucous membranes are moist. No oral lesions. Dentition: Normal dentition. No gum lesions. Tongue: No lesions. Palate: No mass and lesions. Pharynx: Oropharynx is clear. Uvula midline. Tonsils: No tonsillar exudate. 0 on the right. 0 on the left. Eyes:      General: Vision grossly intact. Extraocular Movements: Extraocular movements intact. Right eye: No nystagmus. Left eye: No nystagmus. Pupils: Pupils are equal, round, and reactive to light. Neck:      Thyroid: No thyroid mass, thyromegaly or thyroid tenderness. Trachea: Trachea and phonation normal. No tracheal tenderness. Comments: Well-healed neck incision  Cardiovascular:      Rate and Rhythm: Normal rate and regular rhythm. Pulmonary:      Effort: Pulmonary effort is normal.      Breath sounds: Normal breath sounds. No stridor. No wheezing. Musculoskeletal:         General: Normal range of motion. Cervical back: Normal range of motion. No edema or erythema. Lymphadenopathy:      Cervical: No cervical adenopathy. Skin:     General: Skin is warm and dry. Neurological:      General: No focal deficit present. Mental Status: He is alert and oriented to person, place, and time. Mental status is at baseline. Cranial Nerves: Cranial nerves are intact. Coordination: Romberg sign negative. Gait: Gait is intact. Psychiatric:         Mood and Affect: Mood normal.         Behavior: Behavior normal. Behavior is cooperative. Procedure - Removal Impacted Cerumen    Indications: cerumen impaction    Ears are examined under microscope/headlight.  bilateral ears are cleaned using otologic curette, suction, and/or alligator forceps. Assessment and Plan:   1. Hyperparathyroidism   2. Sensorineural hearing loss  3. Hypercalcemia    I reviewed his labs from May 3. His calcium is 9.0. PTH is 50. Continue follow-up with endocrinology. Audiogram today showing moderate to severe overall symmetrical sensorineural hearing loss bilaterally. He has hearing aids currently around 1to 3years old. We gave him a copy of his audiogram to take to his current audiology practice. If he is recommended new hearing aids then he can also follow-up with Dr. Tad Garcia again. See me back in 1 year for ear cleaning.

## 2022-08-10 ENCOUNTER — HOSPITAL ENCOUNTER (OUTPATIENT)
Dept: CT IMAGING | Age: 67
Discharge: HOME | End: 2022-08-10
Payer: MEDICARE

## 2022-08-10 VITALS — HEART RATE: 73 BPM | SYSTOLIC BLOOD PRESSURE: 145 MMHG | DIASTOLIC BLOOD PRESSURE: 81 MMHG

## 2022-08-10 DIAGNOSIS — I25.10 CAD (CORONARY ARTERY DISEASE): ICD-10-CM

## 2022-08-10 PROCEDURE — 74011000250 HC RX REV CODE- 250: Performed by: INTERNAL MEDICINE

## 2022-08-10 PROCEDURE — 74011250637 HC RX REV CODE- 250/637: Performed by: INTERNAL MEDICINE

## 2022-08-10 PROCEDURE — 74011000636 HC RX REV CODE- 636: Performed by: INTERNAL MEDICINE

## 2022-08-10 PROCEDURE — 75574 CT ANGIO HRT W/3D IMAGE: CPT

## 2022-08-10 RX ORDER — NITROGLYCERIN 0.4 MG/1
0.4 TABLET SUBLINGUAL
Status: DISCONTINUED | OUTPATIENT
Start: 2022-08-10 | End: 2022-08-14 | Stop reason: HOSPADM

## 2022-08-10 RX ORDER — METOPROLOL TARTRATE 5 MG/5ML
5 INJECTION INTRAVENOUS
Status: DISCONTINUED | OUTPATIENT
Start: 2022-08-10 | End: 2022-08-14 | Stop reason: HOSPADM

## 2022-08-10 RX ADMIN — IOPAMIDOL 100 ML: 755 INJECTION, SOLUTION INTRAVENOUS at 07:51

## 2022-08-10 RX ADMIN — IOPAMIDOL 50 ML: 755 INJECTION, SOLUTION INTRAVENOUS at 07:51

## 2022-08-10 RX ADMIN — METOPROLOL TARTRATE 5 MG: 5 INJECTION INTRAVENOUS at 07:24

## 2022-08-10 RX ADMIN — NITROGLYCERIN 0.4 MG: 0.4 TABLET, ORALLY DISINTEGRATING SUBLINGUAL at 07:30

## 2022-08-10 RX ADMIN — METOPROLOL TARTRATE 5 MG: 5 INJECTION INTRAVENOUS at 07:33

## 2022-08-10 RX ADMIN — METOPROLOL TARTRATE 5 MG: 5 INJECTION INTRAVENOUS at 07:28

## 2022-08-10 RX ADMIN — METOPROLOL TARTRATE 5 MG: 5 INJECTION INTRAVENOUS at 07:19

## 2022-08-10 NOTE — PROGRESS NOTES
6371  Patient found on CT table supine for CT Coronary. Patient identified with two identifiers and Allergies reviewed. /81 HR 74. Metoprolol 5 gm IVP x  4 doses given. Nitro . 4 mg sublingual given.

## 2022-08-31 ENCOUNTER — OFFICE VISIT (OUTPATIENT)
Dept: ENT CLINIC | Age: 67
End: 2022-08-31
Payer: MEDICARE

## 2022-08-31 DIAGNOSIS — H90.3 SENSORINEURAL HEARING LOSS (SNHL) OF BOTH EARS: Primary | ICD-10-CM

## 2022-08-31 PROCEDURE — V5299 HEARING SERVICE: HCPCS | Performed by: AUDIOLOGIST

## 2022-08-31 NOTE — PROGRESS NOTES
Pt. Name: Susy Dennis   : 1955   MRN: [id number]     Appointment type: Hearing Aid Check    : Muhammad Sick   Model: T Insera 600 P CIC  S/N R: [id number]   S/N L: [id number]  Repair warranty: 3-  L/D warranty: 3-    Patient seen today for hearing aid check. Patient was initially seen for updated audiogram on 2022. At that time, patient stated that he had been fit with Unitron CICs \"2 years ago\" through [facility] and per patient report had not had an updated audiogram in 4 years. Patient states that he would like to transfer hearing aid care to this office as it is closer to his home, and he is also interested in new hearing aids. Informed patient of $60.00 programming fee for hearing aids not purchased through this office; patient agreed. Put patient's most recent audiogram into CARL and updated hearing aids. Also re-ran feedback management and reduced gain to soft sounds by 2 steps. Patient reported comfortable sound quality. Upon inspection, noticed that wax tube for left hearing aid was missing; patient reports that it \"fell out\" several months ago. Did floss some cerumen out of wax tube port on left hearing aid today but recommended to patient that this get replaced. Will discuss with patient at next appointment. Patient states that he is interested in new hearing aids. Briefly discussed styles and levels of technology today; patient would like to stay with a custom in-the-ear style. Also discussed that patient has a secondary insurance benefit for hearing aids every 5 years through [employer]. Will verify insurance and see when patient's benefits renew. Recommended patient return in 1 month for hearing aid check after reprogramming; can also discuss new hearing aids at that time and/or discuss sending left hearing aid in for repair under warranty. Patient paid $60.00 for hearing aid programming today.      Plan: Patient to RTC in 1 month for hearing aid check. Allow 60 minutes in case patient is eligible to use [employer] Federal benefit; can select updated hearing aids if desired.      Eliezer Chanel   Doctor of Audiology

## 2022-09-14 ENCOUNTER — HOSPITAL ENCOUNTER (OUTPATIENT)
Dept: PREADMISSION TESTING | Age: 67
Discharge: HOME | End: 2022-09-14
Payer: MEDICARE

## 2022-09-14 VITALS
OXYGEN SATURATION: 98 % | HEART RATE: 95 BPM | TEMPERATURE: 98.2 F | HEIGHT: 66 IN | BODY MASS INDEX: 32.92 KG/M2 | RESPIRATION RATE: 16 BRPM | WEIGHT: 204.81 LBS | SYSTOLIC BLOOD PRESSURE: 126 MMHG | DIASTOLIC BLOOD PRESSURE: 75 MMHG

## 2022-09-14 LAB
ALBUMIN SERPL-MCNC: 3.6 G/DL (ref 3.5–5)
ALBUMIN/GLOB SERPL: 0.8 {RATIO} (ref 1.1–2.2)
ALP SERPL-CCNC: 84 U/L (ref 45–117)
ALT SERPL-CCNC: 32 U/L (ref 12–78)
ANION GAP SERPL CALC-SCNC: 6 MMOL/L (ref 5–15)
AST SERPL W P-5'-P-CCNC: 25 U/L (ref 15–37)
BILIRUB SERPL-MCNC: 0.4 MG/DL (ref 0.2–1)
BUN SERPL-MCNC: 13 MG/DL (ref 6–20)
BUN/CREAT SERPL: 9 (ref 12–20)
CA-I BLD-MCNC: 8.9 MG/DL (ref 8.5–10.1)
CHLORIDE SERPL-SCNC: 108 MMOL/L (ref 97–108)
CO2 SERPL-SCNC: 27 MMOL/L (ref 21–32)
CREAT SERPL-MCNC: 1.41 MG/DL (ref 0.7–1.3)
ERYTHROCYTE [DISTWIDTH] IN BLOOD BY AUTOMATED COUNT: 13 % (ref 11.5–14.5)
GLOBULIN SER CALC-MCNC: 4.4 G/DL (ref 2–4)
GLUCOSE SERPL-MCNC: 150 MG/DL (ref 65–100)
HCT VFR BLD AUTO: 33.9 % (ref 36.6–50.3)
HGB BLD-MCNC: 11.3 G/DL (ref 12.1–17)
MCH RBC QN AUTO: 28.9 PG (ref 26–34)
MCHC RBC AUTO-ENTMCNC: 33.3 G/DL (ref 30–36.5)
MCV RBC AUTO: 86.7 FL (ref 80–99)
NRBC # BLD: 0 K/UL (ref 0–0.01)
NRBC BLD-RTO: 0 PER 100 WBC
PLATELET # BLD AUTO: 232 K/UL (ref 150–400)
PMV BLD AUTO: 10.2 FL (ref 8.9–12.9)
POTASSIUM SERPL-SCNC: 3.6 MMOL/L (ref 3.5–5.1)
PROT SERPL-MCNC: 8 G/DL (ref 6.4–8.2)
RBC # BLD AUTO: 3.91 M/UL (ref 4.1–5.7)
SODIUM SERPL-SCNC: 141 MMOL/L (ref 136–145)
WBC # BLD AUTO: 10.9 K/UL (ref 4.1–11.1)

## 2022-09-14 PROCEDURE — 80053 COMPREHEN METABOLIC PANEL: CPT

## 2022-09-14 PROCEDURE — 36415 COLL VENOUS BLD VENIPUNCTURE: CPT

## 2022-09-14 PROCEDURE — 85027 COMPLETE CBC AUTOMATED: CPT

## 2022-09-14 RX ORDER — HYDROCHLOROTHIAZIDE 25 MG/1
TABLET ORAL
COMMUNITY

## 2022-09-14 RX ORDER — LISINOPRIL 40 MG/1
TABLET ORAL
COMMUNITY
Start: 2022-07-30

## 2022-09-14 RX ORDER — POTASSIUM CHLORIDE 750 MG/1
CAPSULE, EXTENDED RELEASE ORAL
COMMUNITY
Start: 2022-08-23

## 2022-09-14 RX ORDER — LATANOPROST 50 UG/ML
SOLUTION/ DROPS OPHTHALMIC
COMMUNITY
Start: 2022-08-17

## 2022-09-14 RX ORDER — ORAL SEMAGLUTIDE 7 MG/1
14 TABLET ORAL
COMMUNITY
Start: 2022-08-02

## 2022-09-15 ENCOUNTER — HOSPITAL ENCOUNTER (OUTPATIENT)
Age: 67
Setting detail: OBSERVATION
Discharge: HOME | End: 2022-09-16
Attending: INTERNAL MEDICINE | Admitting: INTERNAL MEDICINE
Payer: MEDICARE

## 2022-09-15 DIAGNOSIS — R06.02 SHORTNESS OF BREATH: ICD-10-CM

## 2022-09-15 DIAGNOSIS — R07.9 CHEST PAIN, UNSPECIFIED TYPE: ICD-10-CM

## 2022-09-15 PROBLEM — I25.10 CAD (CORONARY ARTERY DISEASE): Status: ACTIVE | Noted: 2022-09-15

## 2022-09-15 LAB
GLUCOSE BLD STRIP.AUTO-MCNC: 112 MG/DL (ref 65–100)
GLUCOSE BLD STRIP.AUTO-MCNC: 132 MG/DL (ref 65–100)
GLUCOSE BLD STRIP.AUTO-MCNC: 134 MG/DL (ref 65–100)
PERFORMED BY, TECHID: ABNORMAL

## 2022-09-15 PROCEDURE — 77030013516 HC DEV INFL ANGI MRTM -B: Performed by: INTERNAL MEDICINE

## 2022-09-15 PROCEDURE — 99153 MOD SED SAME PHYS/QHP EA: CPT | Performed by: INTERNAL MEDICINE

## 2022-09-15 PROCEDURE — 93571 IV DOP VEL&/PRESS C FLO 1ST: CPT | Performed by: INTERNAL MEDICINE

## 2022-09-15 PROCEDURE — 74011000250 HC RX REV CODE- 250: Performed by: INTERNAL MEDICINE

## 2022-09-15 PROCEDURE — C1769 GUIDE WIRE: HCPCS | Performed by: INTERNAL MEDICINE

## 2022-09-15 PROCEDURE — 92928 PRQ TCAT PLMT NTRAC ST 1 LES: CPT | Performed by: INTERNAL MEDICINE

## 2022-09-15 PROCEDURE — C1887 CATHETER, GUIDING: HCPCS | Performed by: INTERNAL MEDICINE

## 2022-09-15 PROCEDURE — 99152 MOD SED SAME PHYS/QHP 5/>YRS: CPT | Performed by: INTERNAL MEDICINE

## 2022-09-15 PROCEDURE — 76210000017 HC OR PH I REC 1.5 TO 2 HR: Performed by: INTERNAL MEDICINE

## 2022-09-15 PROCEDURE — 77030019698 HC SYR ANGI MDLON MRTM -A: Performed by: INTERNAL MEDICINE

## 2022-09-15 PROCEDURE — 2709999900 HC NON-CHARGEABLE SUPPLY: Performed by: INTERNAL MEDICINE

## 2022-09-15 PROCEDURE — 77030008542 HC TBNG MON PRSS EDWD -A: Performed by: INTERNAL MEDICINE

## 2022-09-15 PROCEDURE — 74011000258 HC RX REV CODE- 258: Performed by: INTERNAL MEDICINE

## 2022-09-15 PROCEDURE — C1725 CATH, TRANSLUMIN NON-LASER: HCPCS | Performed by: INTERNAL MEDICINE

## 2022-09-15 PROCEDURE — 82962 GLUCOSE BLOOD TEST: CPT

## 2022-09-15 PROCEDURE — 77030012468 HC VLV BLEEDBK CNTRL ABBT -B: Performed by: INTERNAL MEDICINE

## 2022-09-15 PROCEDURE — 77030025703 HC SYR ANGI VACLOK MRTM -A: Performed by: INTERNAL MEDICINE

## 2022-09-15 PROCEDURE — 77030016700 HC CATH ANGI DX INFN2 CARD -B: Performed by: INTERNAL MEDICINE

## 2022-09-15 PROCEDURE — C1874 STENT, COATED/COV W/DEL SYS: HCPCS | Performed by: INTERNAL MEDICINE

## 2022-09-15 PROCEDURE — G0378 HOSPITAL OBSERVATION PER HR: HCPCS

## 2022-09-15 PROCEDURE — 74011250637 HC RX REV CODE- 250/637: Performed by: INTERNAL MEDICINE

## 2022-09-15 PROCEDURE — 93005 ELECTROCARDIOGRAM TRACING: CPT

## 2022-09-15 PROCEDURE — C1894 INTRO/SHEATH, NON-LASER: HCPCS | Performed by: INTERNAL MEDICINE

## 2022-09-15 PROCEDURE — 93458 L HRT ARTERY/VENTRICLE ANGIO: CPT | Performed by: INTERNAL MEDICINE

## 2022-09-15 PROCEDURE — 74011000636 HC RX REV CODE- 636: Performed by: INTERNAL MEDICINE

## 2022-09-15 PROCEDURE — 74011250636 HC RX REV CODE- 250/636: Performed by: INTERNAL MEDICINE

## 2022-09-15 DEVICE — STENT COR DES 3.50X15MM -- DES RESOLUTE ONYX: Type: IMPLANTABLE DEVICE | Status: FUNCTIONAL

## 2022-09-15 RX ORDER — LISINOPRIL 40 MG/1
40 TABLET ORAL DAILY
Status: DISCONTINUED | OUTPATIENT
Start: 2022-09-15 | End: 2022-09-16 | Stop reason: HOSPADM

## 2022-09-15 RX ORDER — NITROGLYCERIN 5 MG/ML
INJECTION, SOLUTION INTRAVENOUS AS NEEDED
Status: DISCONTINUED | OUTPATIENT
Start: 2022-09-15 | End: 2022-09-15 | Stop reason: HOSPADM

## 2022-09-15 RX ORDER — GUAIFENESIN 100 MG/5ML
LIQUID (ML) ORAL AS NEEDED
Status: DISCONTINUED | OUTPATIENT
Start: 2022-09-15 | End: 2022-09-15 | Stop reason: HOSPADM

## 2022-09-15 RX ORDER — FENTANYL CITRATE 50 UG/ML
INJECTION, SOLUTION INTRAMUSCULAR; INTRAVENOUS AS NEEDED
Status: DISCONTINUED | OUTPATIENT
Start: 2022-09-15 | End: 2022-09-15 | Stop reason: HOSPADM

## 2022-09-15 RX ORDER — SODIUM CHLORIDE 0.9 % (FLUSH) 0.9 %
5-40 SYRINGE (ML) INJECTION EVERY 8 HOURS
Status: DISCONTINUED | OUTPATIENT
Start: 2022-09-15 | End: 2022-09-16 | Stop reason: HOSPADM

## 2022-09-15 RX ORDER — HYDROCHLOROTHIAZIDE 25 MG/1
25 TABLET ORAL DAILY
Status: DISCONTINUED | OUTPATIENT
Start: 2022-09-15 | End: 2022-09-16 | Stop reason: HOSPADM

## 2022-09-15 RX ORDER — ASPIRIN 81 MG/1
81 TABLET ORAL DAILY
Status: DISCONTINUED | OUTPATIENT
Start: 2022-09-15 | End: 2022-09-16 | Stop reason: HOSPADM

## 2022-09-15 RX ORDER — SODIUM CHLORIDE 9 MG/ML
75 INJECTION, SOLUTION INTRAVENOUS CONTINUOUS
Status: DISCONTINUED | OUTPATIENT
Start: 2022-09-15 | End: 2022-09-16 | Stop reason: HOSPADM

## 2022-09-15 RX ORDER — LIDOCAINE HYDROCHLORIDE 10 MG/ML
INJECTION INFILTRATION; PERINEURAL AS NEEDED
Status: DISCONTINUED | OUTPATIENT
Start: 2022-09-15 | End: 2022-09-15 | Stop reason: HOSPADM

## 2022-09-15 RX ORDER — ATORVASTATIN CALCIUM 10 MG/1
10 TABLET, FILM COATED ORAL DAILY
Status: DISCONTINUED | OUTPATIENT
Start: 2022-09-15 | End: 2022-09-16 | Stop reason: HOSPADM

## 2022-09-15 RX ORDER — MIDAZOLAM HYDROCHLORIDE 1 MG/ML
INJECTION INTRAMUSCULAR; INTRAVENOUS AS NEEDED
Status: DISCONTINUED | OUTPATIENT
Start: 2022-09-15 | End: 2022-09-15 | Stop reason: HOSPADM

## 2022-09-15 RX ORDER — SODIUM CHLORIDE 0.9 % (FLUSH) 0.9 %
5-40 SYRINGE (ML) INJECTION AS NEEDED
Status: DISCONTINUED | OUTPATIENT
Start: 2022-09-15 | End: 2022-09-16 | Stop reason: HOSPADM

## 2022-09-15 RX ORDER — GABAPENTIN 300 MG/1
300 CAPSULE ORAL
Status: DISCONTINUED | OUTPATIENT
Start: 2022-09-15 | End: 2022-09-16 | Stop reason: HOSPADM

## 2022-09-15 RX ORDER — HEPARIN SODIUM 1000 [USP'U]/ML
INJECTION, SOLUTION INTRAVENOUS; SUBCUTANEOUS AS NEEDED
Status: DISCONTINUED | OUTPATIENT
Start: 2022-09-15 | End: 2022-09-15 | Stop reason: HOSPADM

## 2022-09-15 RX ORDER — SIMVASTATIN 10 MG/1
20 TABLET, FILM COATED ORAL DAILY
Status: DISCONTINUED | OUTPATIENT
Start: 2022-09-15 | End: 2022-09-15

## 2022-09-15 RX ADMIN — ASPIRIN 81 MG: 81 TABLET, COATED ORAL at 12:41

## 2022-09-15 RX ADMIN — SODIUM CHLORIDE, PRESERVATIVE FREE 5 ML: 5 INJECTION INTRAVENOUS at 12:53

## 2022-09-15 RX ADMIN — SODIUM CHLORIDE 75 ML/HR: 9 INJECTION, SOLUTION INTRAVENOUS at 07:56

## 2022-09-15 RX ADMIN — SODIUM CHLORIDE, PRESERVATIVE FREE 10 ML: 5 INJECTION INTRAVENOUS at 21:32

## 2022-09-15 RX ADMIN — TICAGRELOR 90 MG: 90 TABLET ORAL at 12:41

## 2022-09-15 RX ADMIN — LISINOPRIL 40 MG: 40 TABLET ORAL at 12:41

## 2022-09-15 RX ADMIN — HYDROCHLOROTHIAZIDE 25 MG: 25 TABLET ORAL at 12:41

## 2022-09-15 RX ADMIN — TICAGRELOR 90 MG: 90 TABLET ORAL at 21:29

## 2022-09-15 NOTE — PERIOP NOTES
TRANSFER - OUT REPORT:    Verbal report given to SIMON Cordero(name) on Grsi Wheatley  being transferred to [facility](unit) for routine post - op       Report consisted of patients Situation, Background, Assessment and   Recommendations(SBAR). Information from the following report(s) SBAR, Procedure Summary, Intake/Output, and MAR was reviewed with the receiving nurse. Opportunity for questions and clarification was provided.       Patient transported with:   Monitor  Registered Nurse

## 2022-09-15 NOTE — Clinical Note
TRANSFER - OUT REPORT:     Verbal report given to: (at bedside). Report consisted of patient's Situation, Background, Assessment and   Recommendations(SBAR). Opportunity for questions and clarification was provided. Patient transported with a Registered Nurse and Monitor. Patient transported to: recovery.

## 2022-09-15 NOTE — PROGRESS NOTES
Primary Nurse Ren Rasmussen RN and Sunday Duty RN  RN performed a dual skin assessment on this patient No impairment noted  Gabriel score is 18

## 2022-09-15 NOTE — PROGRESS NOTES
Medicare Outpatient Observation Notice (MOON)/ [place] Outpatient Observation Notice (Rodolfo Diaz) provided to patient/representative with verbal explanation of the notice. Time allotted for questions regarding the notice. Patient /representative provided a completed copy of the MOON/VOON notice. Copy placed on bedside chart.

## 2022-09-15 NOTE — Clinical Note
Contrast Dose Calculator:   Patient's age: 74.   Patient's sex: Male. Patient weight (kg) = 92.9. Creatinine level (mg/dL) = 1.41. Creatinine clearance (mL/min): 69.   Contrast concentration (mg/mL) = 370. MACD = 267.11 mL. Max Contrast dose per Creatinine Cl calculator = 155.25 mL.

## 2022-09-15 NOTE — PROGRESS NOTES
Problem: Falls - Risk of  Goal: *Absence of Falls  Description: Document [place] Fall Risk and appropriate interventions in the flowsheet.   Outcome: Progressing Towards Goal  Note: Fall Risk Interventions:            Medication Interventions: Bed/chair exit alarm, Teach patient to arise slowly, Patient to call before getting OOB                   Problem: Patient Education: Go to Patient Education Activity  Goal: Patient/Family Education  Outcome: Progressing Towards Goal

## 2022-09-16 VITALS
WEIGHT: 209.44 LBS | TEMPERATURE: 98.2 F | SYSTOLIC BLOOD PRESSURE: 135 MMHG | HEART RATE: 89 BPM | RESPIRATION RATE: 18 BRPM | OXYGEN SATURATION: 99 % | DIASTOLIC BLOOD PRESSURE: 81 MMHG | BODY MASS INDEX: 33.8 KG/M2

## 2022-09-16 LAB
GLUCOSE BLD STRIP.AUTO-MCNC: 154 MG/DL (ref 65–100)
PERFORMED BY, TECHID: ABNORMAL

## 2022-09-16 PROCEDURE — 74011000250 HC RX REV CODE- 250: Performed by: INTERNAL MEDICINE

## 2022-09-16 PROCEDURE — 74011250637 HC RX REV CODE- 250/637: Performed by: INTERNAL MEDICINE

## 2022-09-16 PROCEDURE — G0378 HOSPITAL OBSERVATION PER HR: HCPCS

## 2022-09-16 PROCEDURE — 82962 GLUCOSE BLOOD TEST: CPT

## 2022-09-16 RX ORDER — ATORVASTATIN CALCIUM 10 MG/1
80 TABLET, FILM COATED ORAL DAILY
Qty: 30 TABLET | Refills: 3 | Status: SHIPPED | OUTPATIENT
Start: 2022-09-17 | End: 2022-09-27

## 2022-09-16 RX ADMIN — SODIUM CHLORIDE, PRESERVATIVE FREE 10 ML: 5 INJECTION INTRAVENOUS at 05:31

## 2022-09-16 RX ADMIN — TICAGRELOR 90 MG: 90 TABLET ORAL at 08:34

## 2022-09-16 RX ADMIN — ASPIRIN 81 MG: 81 TABLET, COATED ORAL at 08:32

## 2022-09-16 RX ADMIN — ATORVASTATIN CALCIUM 10 MG: 10 TABLET, FILM COATED ORAL at 08:32

## 2022-09-16 RX ADMIN — HYDROCHLOROTHIAZIDE 25 MG: 25 TABLET ORAL at 08:32

## 2022-09-16 RX ADMIN — LISINOPRIL 40 MG: 40 TABLET ORAL at 08:32

## 2022-09-16 NOTE — PROGRESS NOTES
Discharge plan of care/case management plan validated with provider discharge order. I have reviewed discharge instructions with the patient. The patient verbalized understanding. Iv and tele removed. Patient awaiting transportation.

## 2022-09-16 NOTE — PROGRESS NOTES
Problem: Falls - Risk of  Goal: *Absence of Falls  Description: Document [place] Fall Risk and appropriate interventions in the flowsheet.   Outcome: Progressing Towards Goal  Note: Fall Risk Interventions:  Mobility Interventions: Utilize walker, cane, or other assistive device         Medication Interventions: Bed/chair exit alarm                   Problem: Patient Education: Go to Patient Education Activity  Goal: Patient/Family Education  Outcome: Progressing Towards Goal

## 2022-09-16 NOTE — PROGRESS NOTES
Problem: Falls - Risk of  Goal: *Absence of Falls  Description: Document [place] Fall Risk and appropriate interventions in the flowsheet.   Outcome: Progressing Towards Goal  Note: Fall Risk Interventions:  Mobility Interventions: Patient to call before getting OOB         Medication Interventions: Teach patient to arise slowly                   Problem: Patient Education: Go to Patient Education Activity  Goal: Patient/Family Education  Outcome: Progressing Towards Goal

## 2022-09-16 NOTE — DISCHARGE SUMMARY
Admit date: 9/15/2022   Admitting Provider: Sulma Tejada MD    Discharge date: 9/16/2022  Discharging Provider: Sulma Tejada MD      * Admission Diagnoses: Shortness of breath [R06.02]  Chest pain, unspecified type [R07.9]  Chest pain [R07.9]  CAD (coronary artery disease) [I25.10]    * Discharge Diagnoses:    Hospital Problems as of 9/16/2022 Date Reviewed: 5/25/2022            Codes Class Noted - Resolved POA    Chest pain ICD-10-CM: R07.9  ICD-9-CM: 786.50  9/15/2022 - Present Unknown        CAD (coronary artery disease) ICD-10-CM: I25.10  ICD-9-CM: 414.00  9/15/2022 - Present Unknown           * Hospital Course:     Patient presented for cardiac catheterization due to abnormal coronary CT scan. He was noted to have moderate to severe proximal LAD disease that was treated with a drug-eluting stent. Patient was monitored overnight. Denied having any chest pain or shortness of breath. Rhythm was stable. Right radial pulses +2 without hematoma he was felt to be stable for discharge on 9/16/2022. We discussed regarding importance of being compliant with aspirin and Brilinta. * Procedures:   Procedure(s):  LEFT HEART CATH / CORONARY ANGIOGRAPHY  PERCUTANEOUS CORONARY INTERVENTION      Consults: Rehabilitation Medicine    Significant Diagnostic Studies: labs:     Discharge Exam:  Visit Vitals  /81 (BP 1 Location: Right upper arm, BP Patient Position: At rest)   Pulse 89   Temp 98.2 °F (36.8 °C)   Resp 18   Wt 95 kg (209 lb 7 oz)   SpO2 99%   BMI 33.80 kg/m²     General:  Alert, cooperative, no distress, appears stated age. Head:  Normocephalic, without obvious abnormality, atraumatic. Eyes:  Conjunctivae/corneas clear. PERRL, EOMs intact. Fundi benign   Ears:  Normal TMs and external ear canals both ears. Nose: Nares normal. Septum midline. Mucosa normal. No drainage or sinus tenderness.    Throat: Lips, mucosa, and tongue normal. Teeth and gums normal.   Neck: Supple, symmetrical, trachea midline, no adenopathy, thyroid: no enlargement/tenderness/nodules, no carotid bruit and no JVD. Back:   Symmetric, no curvature. ROM normal. No CVA tenderness. Lungs:   Clear to auscultation bilaterally. Chest wall:  No tenderness or deformity. Heart:  Regular rate and rhythm, S1, S2 normal, no murmur, click, rub or gallop. Abdomen:   Soft, non-tender. Bowel sounds normal. No masses,  No organomegaly. Genitalia:  Normal male without lesion, discharge or tenderness. Rectal:  Normal tone, normal prostate, no masses or tenderness  Guaiac negative stool. Extremities: Extremities normal, atraumatic, no cyanosis or edema. Pulses: 2+ and symmetric all extremities. Skin: Skin color, texture, turgor normal. No rashes or lesions   Lymph nodes: Cervical, supraclavicular, and axillary nodes normal.   Neurologic: CNII-XII intact. Normal strength, sensation and reflexes throughout. * Discharge Condition: good  * Disposition: Home    Discharge Medications:  Current Discharge Medication List        START taking these medications    Details   atorvastatin (LIPITOR) 10 mg tablet Take 8 Tablets by mouth daily. Indications: hardening of the arteries due to plaque buildup  Qty: 30 Tablet, Refills: 3  Start date: 9/17/2022      ticagrelor (BRILINTA) 90 mg tablet Take 1 Tablet by mouth two (2) times a day. Indications: blood clot prevention following percutaneous coronary intervention  Qty: 60 Tablet, Refills: 3  Start date: 9/16/2022           CONTINUE these medications which have NOT CHANGED    Details   hydroCHLOROthiazide (HYDRODIURIL) 25 mg tablet       latanoprost (XALATAN) 0.005 % ophthalmic solution 1 drop      Rybelsus 7 mg tablet 14 mg.      potassium chloride SA (MICRO-K) 10 mEq capsule       lisinopriL (PRINIVIL, ZESTRIL) 40 mg tablet       gabapentin (NEURONTIN) 300 mg capsule Take 300 mg by mouth nightly. hydrALAZINE (APRESOLINE) 100 mg tablet Take 100 mg by mouth two (2) times a day. aspirin delayed-release 81 mg tablet Take 81 mg by mouth daily. albuterol (PROVENTIL HFA, VENTOLIN HFA, PROAIR HFA) 90 mcg/actuation inhaler TAKE 2 INHALATIONS UP TO 4X A DAY AS NEEDED FOR SHORTNESS OF BREATH.      nebivoloL (BYSTOLIC) 20 mg tablet Take 20 mg by mouth daily. amLODIPine (NORVASC) 10 mg tablet Take 10 mg by mouth daily. dapagliflozin (FARXIGA) 10 mg tab tablet Take 10 mg by mouth daily. STOP taking these medications       simvastatin (ZOCOR) 20 mg tablet Comments:   Reason for Stopping:         Ozempic 0.25 mg or 0.5 mg/dose (2 mg/1.5 ml) subq pen Comments:   Reason for Stopping:         Bydureon BCise 2 mg/0.85 mL atIn Comments:   Reason for Stopping:         SITagliptin-metFORMIN (Janumet) 50-1,000 mg per tablet Comments:   Reason for Stopping:               * Follow-up Care/Patient Instructions:   Activity: Activity as tolerated and no driving for today  Diet: Cardiac Diet  Wound Care: Keep wound clean and dry    Follow-up Information       Follow up With Specialties Details Why 555  [address] Nurse Practitioner   [address]  [address] [facility] [address]  [phone]              Signed:  Nino Zaldivar MD  9/16/2022  9:22 AM

## 2022-09-19 ENCOUNTER — TELEPHONE (OUTPATIENT)
Dept: ENT CLINIC | Age: 67
End: 2022-09-19

## 2022-09-19 LAB
ATRIAL RATE: 85 BPM
CALCULATED P AXIS, ECG09: 37 DEGREES
CALCULATED R AXIS, ECG10: -4 DEGREES
CALCULATED T AXIS, ECG11: 24 DEGREES
DIAGNOSIS, 93000: NORMAL
P-R INTERVAL, ECG05: 206 MS
Q-T INTERVAL, ECG07: 360 MS
QRS DURATION, ECG06: 84 MS
QTC CALCULATION (BEZET), ECG08: 428 MS
VENTRICULAR RATE, ECG03: 85 BPM

## 2022-09-19 PROCEDURE — 93010 ELECTROCARDIOGRAM REPORT: CPT | Performed by: INTERNAL MEDICINE

## 2022-09-19 NOTE — TELEPHONE ENCOUNTER
Per insurance pt's hearing aid benefits renews the beginning of the calendar year in 2023  XNI#[id number]

## 2022-09-27 ENCOUNTER — HOSPITAL ENCOUNTER (OUTPATIENT)
Dept: CARDIAC REHAB | Age: 67
Discharge: HOME | End: 2022-09-27
Payer: MEDICARE

## 2022-09-27 VITALS — HEIGHT: 65 IN | BODY MASS INDEX: 33.39 KG/M2 | WEIGHT: 200.4 LBS

## 2022-09-27 PROCEDURE — 93798 PHYS/QHP OP CAR RHAB W/ECG: CPT

## 2022-09-27 PROCEDURE — 93797 PHYS/QHP OP CAR RHAB WO ECG: CPT

## 2022-09-27 RX ORDER — CHOLECALCIFEROL (VITAMIN D3) 125 MCG
2000 CAPSULE ORAL
COMMUNITY

## 2022-09-27 RX ORDER — ATORVASTATIN CALCIUM 80 MG/1
80 TABLET, FILM COATED ORAL DAILY
COMMUNITY

## 2022-09-27 NOTE — CARDIO/PULMONARY
[employer]   74 y.o.    presented to cardiac rehab for orientation and exercise tolerance test today with a primary diagnosis of PTCA with PCI placement. Bradley Amezquita has a history of . Cardiac risk factors include hypertension, stress, thyroid dysfunction. Bradley Amezquita is  and lives with his wife. PHQ9, depression score, is 9 and this is considered mild. The result was discussed with patient who affirms score to be accurate. Patient denied chest pain or SOB during 6 minute walk test and was in SR without ectopy. Exercise prescription developed around patient stated goals, to be supplemented with home exercise recommendations. EF is 60-65%. Education manual given to patient and reviewed. Patient will attend cardiac rehab 2 times.

## 2022-09-29 ENCOUNTER — HOSPITAL ENCOUNTER (OUTPATIENT)
Dept: CARDIAC REHAB | Age: 67
Discharge: HOME | End: 2022-09-29
Payer: MEDICARE

## 2022-09-29 ENCOUNTER — OFFICE VISIT (OUTPATIENT)
Dept: ENT CLINIC | Age: 67
End: 2022-09-29
Payer: MEDICARE

## 2022-09-29 VITALS — BODY MASS INDEX: 33.45 KG/M2 | WEIGHT: 201 LBS

## 2022-09-29 DIAGNOSIS — H90.3 SENSORINEURAL HEARING LOSS (SNHL) OF BOTH EARS: Primary | ICD-10-CM

## 2022-09-29 PROCEDURE — 93798 PHYS/QHP OP CAR RHAB W/ECG: CPT

## 2022-09-29 PROCEDURE — V5299 HEARING SERVICE: HCPCS | Performed by: AUDIOLOGIST

## 2022-09-29 NOTE — PROGRESS NOTES
Pt. Name: Ingrid Ramey   : 1955   MRN: [id number]     Appointment type: Hearing Aid Check    :  Eddie Regalado                         Model: T Insera 600 P CIC  S/N R:  [id number]                                S/N L: [id number]  Repair warranty: 3-                L/D warranty: 3-    Patient in for a progress check with [employer] T Insera 600 P CIC hearing aids. Patient reports no issues but he is interested in discussing new technology. Discussed options for new hearing aids today including PAUL and custom styles; patient would prefer to keep his current CIC style with no rechargeability option. He would also like to stay as close as possible to his [address] benefit of $2500. He is eligible to use this benefit in . Provided sen quote for Phonak Virto P50 CIC hearing aids today. At last appointment 2022, it was noted that the left wax tube port was missing; patient had reported that it \"fell out. \" Patient's hearing aids are still under warranty through 3-. Offered to send hearing aid in for repair at this time; patient would like to wait until he has his new hearing aids as he does not want to be without one of his hearing aids for 2-3 weeks. Patient inquired today about wearing hearing aids while hunting. Advised him that he should not have hearing aids in while shooting a gun and should instead use hearing protection. Patient stated that without his hearing aids, he is unable to hear the deer. Advised patient that he could feasibly keep hearing aids in until just before shooting, and exchange hearing aids for hearing protection while shooting; patient stated this might be too time-consuming. Reiterated that hearing protection should be used around gunfire rather than hearing aids, and provided several pairs of disposable earplugs today.      Recommended earmold impressions for new hearing aids be obtained closer to the time of ordering to ensure material does not shrink. Patient agreed. Plan: Patient to RTC in January 2023 for earmold impression. Order hearing aids and schedule hearing aid fitting at that time.      Eliezer Persaud   Doctor of Audiology

## 2022-10-04 ENCOUNTER — HOSPITAL ENCOUNTER (OUTPATIENT)
Dept: CARDIAC REHAB | Age: 67
Discharge: HOME | End: 2022-10-04
Payer: MEDICARE

## 2022-10-04 VITALS — WEIGHT: 193 LBS | BODY MASS INDEX: 32.12 KG/M2

## 2022-10-04 PROCEDURE — 93798 PHYS/QHP OP CAR RHAB W/ECG: CPT

## 2022-10-05 ENCOUNTER — TRANSCRIBE ORDER (OUTPATIENT)
Dept: EMERGENCY DEPT | Age: 67
End: 2022-10-05

## 2022-10-06 ENCOUNTER — APPOINTMENT (OUTPATIENT)
Dept: CARDIAC REHAB | Age: 67
End: 2022-10-06
Payer: MEDICARE

## 2022-10-11 ENCOUNTER — HOSPITAL ENCOUNTER (OUTPATIENT)
Dept: CARDIAC REHAB | Age: 67
Discharge: HOME | End: 2022-10-11
Payer: MEDICARE

## 2022-10-11 VITALS — BODY MASS INDEX: 32.03 KG/M2 | WEIGHT: 192.5 LBS

## 2022-10-11 PROCEDURE — 93798 PHYS/QHP OP CAR RHAB W/ECG: CPT

## 2022-10-13 ENCOUNTER — HOSPITAL ENCOUNTER (OUTPATIENT)
Dept: CARDIAC REHAB | Age: 67
Discharge: HOME | End: 2022-10-13
Payer: MEDICARE

## 2022-10-13 VITALS — BODY MASS INDEX: 32.42 KG/M2 | WEIGHT: 194.8 LBS

## 2022-10-13 PROCEDURE — 93798 PHYS/QHP OP CAR RHAB W/ECG: CPT

## 2022-10-18 ENCOUNTER — HOSPITAL ENCOUNTER (OUTPATIENT)
Dept: CARDIAC REHAB | Age: 67
Discharge: HOME | End: 2022-10-18
Payer: MEDICARE

## 2022-10-18 VITALS — BODY MASS INDEX: 32.48 KG/M2 | WEIGHT: 195.2 LBS

## 2022-10-18 PROCEDURE — 93798 PHYS/QHP OP CAR RHAB W/ECG: CPT

## 2022-10-20 ENCOUNTER — HOSPITAL ENCOUNTER (OUTPATIENT)
Dept: CARDIAC REHAB | Age: 67
Discharge: HOME | End: 2022-10-20
Payer: MEDICARE

## 2022-10-20 VITALS — WEIGHT: 197.8 LBS | BODY MASS INDEX: 32.92 KG/M2

## 2022-10-20 PROCEDURE — 93798 PHYS/QHP OP CAR RHAB W/ECG: CPT

## 2022-10-25 ENCOUNTER — HOSPITAL ENCOUNTER (OUTPATIENT)
Dept: CARDIAC REHAB | Age: 67
Discharge: HOME | End: 2022-10-25
Payer: MEDICARE

## 2022-10-25 VITALS — BODY MASS INDEX: 32.48 KG/M2 | WEIGHT: 195.2 LBS

## 2022-10-25 PROCEDURE — 93798 PHYS/QHP OP CAR RHAB W/ECG: CPT

## 2022-10-27 ENCOUNTER — HOSPITAL ENCOUNTER (OUTPATIENT)
Dept: CARDIAC REHAB | Age: 67
Discharge: HOME | End: 2022-10-27
Payer: MEDICARE

## 2022-10-27 VITALS — BODY MASS INDEX: 32.48 KG/M2 | WEIGHT: 195.2 LBS

## 2022-10-27 PROCEDURE — 93798 PHYS/QHP OP CAR RHAB W/ECG: CPT

## 2022-11-01 ENCOUNTER — HOSPITAL ENCOUNTER (OUTPATIENT)
Dept: CARDIAC REHAB | Age: 67
Discharge: HOME | End: 2022-11-01
Payer: MEDICARE

## 2022-11-01 VITALS — WEIGHT: 193.2 LBS | BODY MASS INDEX: 32.15 KG/M2

## 2022-11-01 PROCEDURE — 93798 PHYS/QHP OP CAR RHAB W/ECG: CPT

## 2022-11-03 ENCOUNTER — HOSPITAL ENCOUNTER (OUTPATIENT)
Dept: CARDIAC REHAB | Age: 67
Discharge: HOME | End: 2022-11-03
Payer: MEDICARE

## 2022-11-03 VITALS — WEIGHT: 193.3 LBS | BODY MASS INDEX: 32.17 KG/M2

## 2022-11-03 PROCEDURE — 93798 PHYS/QHP OP CAR RHAB W/ECG: CPT

## 2022-11-08 ENCOUNTER — HOSPITAL ENCOUNTER (OUTPATIENT)
Dept: CARDIAC REHAB | Age: 67
Discharge: HOME | End: 2022-11-08
Payer: MEDICARE

## 2022-11-08 VITALS — WEIGHT: 193.6 LBS | BODY MASS INDEX: 32.22 KG/M2

## 2022-11-08 PROCEDURE — 93798 PHYS/QHP OP CAR RHAB W/ECG: CPT

## 2022-11-10 ENCOUNTER — HOSPITAL ENCOUNTER (OUTPATIENT)
Dept: CARDIAC REHAB | Age: 67
Discharge: HOME | End: 2022-11-10
Payer: MEDICARE

## 2022-11-10 ENCOUNTER — APPOINTMENT (OUTPATIENT)
Dept: CARDIAC REHAB | Age: 67
End: 2022-11-10
Payer: MEDICARE

## 2022-11-10 VITALS — WEIGHT: 192.3 LBS | BODY MASS INDEX: 32 KG/M2

## 2022-11-10 PROCEDURE — 93798 PHYS/QHP OP CAR RHAB W/ECG: CPT

## 2022-11-10 PROCEDURE — 93797 PHYS/QHP OP CAR RHAB WO ECG: CPT

## 2022-11-10 NOTE — CARDIO/PULMONARY
Lifestyle/Nutrition Nurse    Met with patient via Doxy. me and discussed the following topics noted below. Stress:  Patient states he believes he has little stress. We discussed the potential impact of stress on health and the possible benefit of daily stress management such as regular physical activity, deep breathing throughout the day, and chair yoga. Sleep:  Patient states he uses a CPAP machine and regularly sleeps 7-8 hours nightly. Food:  Patient states he lives with his wife and the grandchildren are frequently there because they live close by. Patient states his wife does the cooking and sometimes cooks more heart-health meals. We reviewed the nutrition section of the [facility] education book with a focus on the [address] and the Dash Diet. Patient states he has lost 18 pounds over the past year due to decreased appetite because his activity has been limited due to his hip discomfort. Patient states he currently weighs 193 lb and is 5 ft 5 in. We discussed the potential benefit of losing at least 5-10% of his current body weight. Physical Activity:  Patient states his physical activity has been limited due to hip discomfort. We discussed the potential benefit of chair yoga, water exercise, and resistance exercises. We also discussed the recommendations and potential benefits of regular physical activity. Social Connections:  Patient lives with his wife. His grandchildren live nearby and are frequently at his house. He is also a member of his [employer]. He states he is no longer active in some of his other groups due to his limited physical activity. Risky Substances:  Patient states he has never been a smoker, does not drink alcohol, and does not use any other substances not prescribed for him. Patient Plan: Patient plans to use physical activity, deep breathing throughout the day, and chair yoga for managing daily stress.   Patient plans to ask the exercise physiologist about when and how to initiate resistance exercises. Patient plans to explore the possibility of water exercise and online chair yoga classes. Patient will share the cookbook and education book with his wife to help her prepare heart-healthier meals for him and their grandchildren.

## 2022-11-15 ENCOUNTER — HOSPITAL ENCOUNTER (OUTPATIENT)
Dept: CARDIAC REHAB | Age: 67
Discharge: HOME | End: 2022-11-15
Payer: MEDICARE

## 2022-11-15 VITALS — WEIGHT: 194.2 LBS | BODY MASS INDEX: 32.32 KG/M2

## 2022-11-15 PROCEDURE — 93798 PHYS/QHP OP CAR RHAB W/ECG: CPT

## 2022-11-17 ENCOUNTER — HOSPITAL ENCOUNTER (OUTPATIENT)
Dept: CARDIAC REHAB | Age: 67
Discharge: HOME | End: 2022-11-17
Payer: MEDICARE

## 2022-11-17 VITALS — WEIGHT: 194 LBS | BODY MASS INDEX: 32.28 KG/M2

## 2022-11-17 PROCEDURE — 93798 PHYS/QHP OP CAR RHAB W/ECG: CPT

## 2022-11-22 ENCOUNTER — HOSPITAL ENCOUNTER (OUTPATIENT)
Dept: CARDIAC REHAB | Age: 67
Discharge: HOME | End: 2022-11-22
Payer: MEDICARE

## 2022-11-22 VITALS — BODY MASS INDEX: 32.22 KG/M2 | WEIGHT: 193.6 LBS

## 2022-11-22 PROCEDURE — 93798 PHYS/QHP OP CAR RHAB W/ECG: CPT

## 2022-11-23 ENCOUNTER — OFFICE VISIT (OUTPATIENT)
Dept: ENT CLINIC | Age: 67
End: 2022-11-23
Payer: MEDICARE

## 2022-11-23 VITALS
BODY MASS INDEX: 32.15 KG/M2 | DIASTOLIC BLOOD PRESSURE: 80 MMHG | HEART RATE: 116 BPM | WEIGHT: 193 LBS | OXYGEN SATURATION: 98 % | SYSTOLIC BLOOD PRESSURE: 140 MMHG | HEIGHT: 65 IN | RESPIRATION RATE: 18 BRPM

## 2022-11-23 DIAGNOSIS — H90.3 SENSORINEURAL HEARING LOSS (SNHL) OF BOTH EARS: Primary | ICD-10-CM

## 2022-11-23 DIAGNOSIS — E21.3 HYPERPARATHYROIDISM (HCC): ICD-10-CM

## 2022-11-23 PROCEDURE — 3074F SYST BP LT 130 MM HG: CPT | Performed by: OTOLARYNGOLOGY

## 2022-11-23 PROCEDURE — G8753 SYS BP > OR = 140: HCPCS | Performed by: OTOLARYNGOLOGY

## 2022-11-23 PROCEDURE — G8510 SCR DEP NEG, NO PLAN REQD: HCPCS | Performed by: OTOLARYNGOLOGY

## 2022-11-23 PROCEDURE — G8536 NO DOC ELDER MAL SCRN: HCPCS | Performed by: OTOLARYNGOLOGY

## 2022-11-23 PROCEDURE — G8754 DIAS BP LESS 90: HCPCS | Performed by: OTOLARYNGOLOGY

## 2022-11-23 PROCEDURE — 1124F ACP DISCUSS-NO DSCNMKR DOCD: CPT | Performed by: OTOLARYNGOLOGY

## 2022-11-23 PROCEDURE — G8427 DOCREV CUR MEDS BY ELIG CLIN: HCPCS | Performed by: OTOLARYNGOLOGY

## 2022-11-23 PROCEDURE — G8417 CALC BMI ABV UP PARAM F/U: HCPCS | Performed by: OTOLARYNGOLOGY

## 2022-11-23 PROCEDURE — 3078F DIAST BP <80 MM HG: CPT | Performed by: OTOLARYNGOLOGY

## 2022-11-23 PROCEDURE — 1101F PT FALLS ASSESS-DOCD LE1/YR: CPT | Performed by: OTOLARYNGOLOGY

## 2022-11-23 PROCEDURE — 99213 OFFICE O/P EST LOW 20 MIN: CPT | Performed by: OTOLARYNGOLOGY

## 2022-11-23 PROCEDURE — 3017F COLORECTAL CA SCREEN DOC REV: CPT | Performed by: OTOLARYNGOLOGY

## 2022-11-23 NOTE — PROGRESS NOTES
Patient: Will Granados  YOB: 1955  MRN: [id number]  Date of Service: 11/23/2022    Chief Complaint: Hyperparathyroidism    History of Present Illness: Will Granados is a 74y.o. year old male who presents today accompanied by daughter for discussion of    Referred by Dr. Talita Mccall, endocrinology    Reports right hip pain for 2 years  Saw PCP In June and found to have elevated calcium and intermittent abnormal PTH  Has had no imaging    Denies difficulty swallowing, voice changes   +fatigue, muscle cramping    Per endo note: Onset: 1 year back     Symptoms of hypercalcemia: Constipation, fatigue. Denies any polyuria, polydipsia, stomach pain, kidney stones, bone fractures, episodes of confusion. :  Calcium slightly elevated at 10.8 (8.6-10.2)    11-:  Ionized calcium borderline elevated at 5.7 (4.5-5.6)     1-:  Calcium elevated at 11.1 (8.6-10.2)     5-:  Calcium elevated at 10.7 (8.6-10.2)     5-:  Ionized calcium slightly elevated at 5.8 (4.5-5.6)  PTH normal at 46 (15-65)  Renal function normal.    9-:  Calcium borderline elevated at 10.5 (8.6-10.2)  PTH elevated at 67 (15-65)   It appears that patient has primary hyper parathyroidism. Currently calcium elevation is mild, may be because of vitamin D deficiency. Optimize vitamin D replacement with vitamin D 2000 units twice a day and repeated labs. 11-:  Calcium borderline elevated at 10.4 (8.6-10.2)  PTH at the upper end of normal range at 61     10-7-2021 bone density scan:  Lumbar spine T score -2.8  Right femoral neck T score -2.3  Right hip total T score -1.5  Left femoral neck T score -1.4  Left hip total T score -0.5  Plan:  Primary hyperparathyroidism. Although calcium elevation is mild, patient has osteoporosis and he meets criteria for surgery. He will need treatment for osteoporosis after surgery. Referring patient to ENT. I will see him back in 2 months.   Continue to stay off calcium tablets, Tums, hydrochlorothiazide. Family Hx: mother with parathyroid    1/28/22 - pt follows up today to discuss results of US and PTH scan    4/27/2022 -6 days postop parathyroidectomy. Doing well no complaints    5/25/2022 -1 month follow-up. Still states he is doing well just little bit of sensitivity at the incision site. He had blood work from endocrinologist earlier this month. He went to the ER 10 days ago for rapid heart rate. He is supposedly having some cardiac testing done later this summer. He states he has just not regained his energy yet since after surgery. Here for hearing testing as well. 11/23/2022  6-month follow-up is overall been doing well he was complaining of some pressure he feels like at his incision site no issues with swallowing no choking. Is also complaining of some cold intolerance since his surgery    Review of Systems   Constitutional:  Negative for chills and fever. HENT:  Positive for hearing loss. Negative for ear pain, nosebleeds and tinnitus. Eyes:  Negative for blurred vision and double vision. Respiratory:  Positive for shortness of breath. Negative for cough and sputum production. Cardiovascular:  Positive for palpitations. Negative for chest pain. Gastrointestinal:  Negative for heartburn, nausea and vomiting. Musculoskeletal:  Positive for joint pain. Negative for neck pain. Skin: Negative. Neurological:  Negative for dizziness, speech change, weakness and headaches. Endo/Heme/Allergies:  Negative for environmental allergies. Does not bruise/bleed easily. Psychiatric/Behavioral:  Negative for memory loss. The patient does not have insomnia.         Past Medical History:  Past Medical History:   Diagnosis Date    CAD (coronary artery disease)     pt denies    Diabetes ([address]     GERD (gastroesophageal reflux disease)     Hypertension     Sleep apnea     CPAP    Thyroid disease        Past Surgical History:   Past Surgical History:   Procedure Laterality Date    HX CATARACT REMOVAL Bilateral     HX COLONOSCOPY      [facility] ENDOCRINE SURG PARATHYROID PROC UNLISTED  03/01/2022       Medications:   Current Outpatient Medications   Medication Instructions    albuterol (PROVENTIL HFA, VENTOLIN HFA, PROAIR HFA) 90 mcg/actuation inhaler TAKE 2 INHALATIONS UP TO 4X A DAY AS NEEDED FOR SHORTNESS OF BREATH.    amLODIPine (NORVASC) 10 mg, Oral, DAILY    aspirin delayed-release 81 mg, Oral, DAILY    atorvastatin (LIPITOR) 80 mg, Oral, DAILY    cholecalciferol, vitamin D3, 50 mcg (2,000 unit) tab 2,000 Int'l Units, Oral    dapagliflozin (FARXIGA) 10 mg, DAILY    gabapentin (NEURONTIN) 300 mg, Oral, EVERY BEDTIME    hydrALAZINE (APRESOLINE) 100 mg, Oral, 2 TIMES DAILY    hydroCHLOROthiazide (HYDRODIURIL) 25 mg tablet No dose, route, or frequency recorded. latanoprost (XALATAN) 0.005 % ophthalmic solution 1 drop    lisinopriL (PRINIVIL, ZESTRIL) 40 mg tablet No dose, route, or frequency recorded. nebivoloL (BYSTOLIC) 20 mg, Oral, DAILY    potassium chloride SA (MICRO-K) 10 mEq capsule No dose, route, or frequency recorded. Rybelsus 14 mg    ticagrelor (BRILINTA) 90 mg, Oral, 2 TIMES DAILY       Allergies: Allergies   Allergen Reactions    Cortisone Anaphylaxis       Social History:   Social History     Tobacco Use    Smoking status: Never     Passive exposure: Never    Smokeless tobacco: Never   Vaping Use    Vaping Use: Never used   Substance Use Topics    Alcohol use: Never    Drug use: Never        Family History:  Family History   Problem Relation Age of Onset    Diabetes Mother     Aortic dissection Mother          Physical Examination:   Vitals:    11/23/22 1009   BP: (!) 140/80   BP 1 Location: Left upper arm   BP Patient Position: Sitting   BP Cuff Size: Adult   Pulse: (!) 116   Resp: 18   Height: 5' 5\" (1.651 m)   Weight: 193 lb (87.5 kg)   SpO2: 98%      Physical Exam  Vitals reviewed. Constitutional:       General: He is awake. Appearance: Normal appearance. He is normal weight. HENT:      Head: Normocephalic and atraumatic. Jaw: There is normal jaw occlusion. No trismus, tenderness or malocclusion. Salivary Glands: Right salivary gland is not diffusely enlarged or tender. Left salivary gland is not diffusely enlarged or tender. Right Ear: Tympanic membrane, ear canal and external ear normal. Decreased hearing noted. There is no impacted cerumen. Left Ear: Tympanic membrane, ear canal and external ear normal. Decreased hearing noted. There is no impacted cerumen. Ears:      Comments: Hearing aids in place     Nose: No septal deviation, mucosal edema or rhinorrhea. Right Turbinates: Not enlarged, swollen or pale. Left Turbinates: Not enlarged, swollen or pale. Right Sinus: No maxillary sinus tenderness or frontal sinus tenderness. Left Sinus: No maxillary sinus tenderness or frontal sinus tenderness. Mouth/Throat:      Lips: Pink. Mouth: Mucous membranes are moist. No oral lesions. Dentition: Normal dentition. No gum lesions. Tongue: No lesions. Palate: No mass and lesions. Pharynx: Oropharynx is clear. Uvula midline. Tonsils: No tonsillar exudate. 0 on the right. 0 on the left. Eyes:      General: Vision grossly intact. Extraocular Movements: Extraocular movements intact. Right eye: No nystagmus. Left eye: No nystagmus. Pupils: Pupils are equal, round, and reactive to light. Neck:      Thyroid: No thyroid mass, thyromegaly or thyroid tenderness. Trachea: Trachea and phonation normal. No tracheal tenderness. Comments: Well-healed neck incision  Approximately 1 cm midline subcutaneous scar tissue at the incision, nontender not fluctuant, mobile  Cardiovascular:      Rate and Rhythm: Normal rate and regular rhythm. Pulmonary:      Effort: Pulmonary effort is normal.      Breath sounds: Normal breath sounds. No stridor.  No wheezing. Musculoskeletal:         General: Normal range of motion. Cervical back: Normal range of motion. No edema or erythema. Lymphadenopathy:      Cervical: No cervical adenopathy. Skin:     General: Skin is warm and dry. Neurological:      General: No focal deficit present. Mental Status: He is alert and oriented to person, place, and time. Mental status is at baseline. Coordination: Romberg sign negative. Gait: Gait is intact. Psychiatric:         Mood and Affect: Mood normal.         Behavior: Behavior normal. Behavior is cooperative. Assessment and Plan:   Hyperparathyroidism   Sensorineural hearing loss  Hypercalcemia    His most recent calcium level in September was 8.9. He has a small scar fibroma likely a suture granuloma midline subcutaneous neck incision. This may be causing some of his symptoms he does not have any symptoms within the pharynx. I offered the option to do a steroid injection or even a simple excision of this but he is not troubled by it just wanted to be looked at. He is complaining of some cold intolerance, I recommend he have a TSH checked at his next blood work with his PCP    He has new hearing aids coming in January. Follow-up as needed.

## 2022-11-23 NOTE — LETTER
11/23/2022    Patient: Refugio So   YOB: 1955   Date of Visit: 11/23/2022     Nevaeh Monroypeter [address] [place] [id number]  Via Fax: [phone]    Dear TANVI Monroy,      Thank you for referring Mr. Dyana Mae to [facility] [address] for evaluation. My notes for this consultation are attached. If you have questions, please do not hesitate to call me. I look forward to following your patient along with you.       Sincerely,    Livan Del Rosario MD

## 2022-11-29 ENCOUNTER — HOSPITAL ENCOUNTER (OUTPATIENT)
Dept: CARDIAC REHAB | Age: 67
End: 2022-11-29
Payer: MEDICARE

## 2022-11-29 VITALS — WEIGHT: 192.8 LBS | BODY MASS INDEX: 32.08 KG/M2

## 2022-11-29 PROCEDURE — 93798 PHYS/QHP OP CAR RHAB W/ECG: CPT

## 2022-12-01 ENCOUNTER — HOSPITAL ENCOUNTER (OUTPATIENT)
Dept: CARDIAC REHAB | Age: 67
Discharge: HOME | End: 2022-12-01
Payer: MEDICARE

## 2022-12-01 VITALS — WEIGHT: 194.4 LBS | BODY MASS INDEX: 32.35 KG/M2

## 2022-12-01 PROCEDURE — 93798 PHYS/QHP OP CAR RHAB W/ECG: CPT

## 2022-12-06 ENCOUNTER — HOSPITAL ENCOUNTER (OUTPATIENT)
Dept: CARDIAC REHAB | Age: 67
Discharge: HOME | End: 2022-12-06
Payer: MEDICARE

## 2022-12-06 VITALS — BODY MASS INDEX: 32.28 KG/M2 | WEIGHT: 194 LBS

## 2022-12-06 PROCEDURE — 93798 PHYS/QHP OP CAR RHAB W/ECG: CPT

## 2022-12-08 ENCOUNTER — HOSPITAL ENCOUNTER (OUTPATIENT)
Dept: CARDIAC REHAB | Age: 67
Discharge: HOME | End: 2022-12-08
Payer: MEDICARE

## 2022-12-08 VITALS — WEIGHT: 192.2 LBS | BODY MASS INDEX: 31.98 KG/M2

## 2022-12-08 PROCEDURE — 93798 PHYS/QHP OP CAR RHAB W/ECG: CPT

## 2022-12-08 NOTE — PROGRESS NOTES
Provided patient education about and explained the importance of medication compliance. Patient was given Brilinta coupon. Patient was advised that if the antiplatelet medication becomes unaffordable, he must notify the cardiologist immediately so that an alternate plan for antiplatelet therapy can be made. Patient stated that he will fill this prescription before or upon discharge so that it will be available for the next scheduled dose after discharge. Patient asked appropriate questions and verbalized understanding during this consultation and was given printed teaching materials and my contact information in case I can provide further assistance. We discussed the options to have prescriptions filled to include bedside delivery before discharge through [facility]. Spoke with patient about phase 2 outpatient cardiac rehab. Patient was given a choice of facilities to be enrolled and chose [email or web address]. Patient was scheduled for and made aware of, verbally and in writing, an initial evaluation appointment to begin cardiac rehab on September 27 at 9:30. Patient was made aware of this verbally and in writing. Patient's information was forwarded to this facility. Patient verbalized understanding and was given printed teaching materials, my contact information in case he needs further assistance, and the address and phone number for the cardiac rehab facility to which he has been referred. Never

## 2022-12-13 ENCOUNTER — HOSPITAL ENCOUNTER (OUTPATIENT)
Dept: CARDIAC REHAB | Age: 67
Discharge: HOME | End: 2022-12-13
Payer: MEDICARE

## 2022-12-13 VITALS — BODY MASS INDEX: 31.95 KG/M2 | WEIGHT: 192 LBS

## 2022-12-13 PROCEDURE — 93798 PHYS/QHP OP CAR RHAB W/ECG: CPT

## 2022-12-15 ENCOUNTER — HOSPITAL ENCOUNTER (OUTPATIENT)
Dept: CARDIAC REHAB | Age: 67
Discharge: HOME | End: 2022-12-15
Payer: MEDICARE

## 2022-12-15 VITALS — WEIGHT: 192 LBS | BODY MASS INDEX: 31.95 KG/M2

## 2022-12-15 PROCEDURE — 93798 PHYS/QHP OP CAR RHAB W/ECG: CPT

## 2022-12-20 ENCOUNTER — HOSPITAL ENCOUNTER (OUTPATIENT)
Dept: CARDIAC REHAB | Age: 67
Discharge: HOME | End: 2022-12-20
Payer: MEDICARE

## 2022-12-20 VITALS — WEIGHT: 194 LBS | BODY MASS INDEX: 32.28 KG/M2

## 2022-12-20 PROCEDURE — 93798 PHYS/QHP OP CAR RHAB W/ECG: CPT

## 2022-12-22 ENCOUNTER — HOSPITAL ENCOUNTER (OUTPATIENT)
Dept: CARDIAC REHAB | Age: 67
Discharge: HOME | End: 2022-12-22
Payer: MEDICARE

## 2022-12-22 VITALS — WEIGHT: 195 LBS | BODY MASS INDEX: 32.45 KG/M2

## 2022-12-22 PROCEDURE — 93798 PHYS/QHP OP CAR RHAB W/ECG: CPT

## 2022-12-27 ENCOUNTER — HOSPITAL ENCOUNTER (OUTPATIENT)
Dept: CARDIAC REHAB | Age: 67
End: 2022-12-27
Payer: MEDICARE

## 2022-12-27 VITALS — BODY MASS INDEX: 32.15 KG/M2 | WEIGHT: 193.2 LBS

## 2022-12-27 PROCEDURE — 93798 PHYS/QHP OP CAR RHAB W/ECG: CPT

## 2022-12-29 ENCOUNTER — HOSPITAL ENCOUNTER (OUTPATIENT)
Dept: CARDIAC REHAB | Age: 67
End: 2022-12-29
Payer: MEDICARE

## 2022-12-29 VITALS — WEIGHT: 195.2 LBS | BODY MASS INDEX: 32.48 KG/M2

## 2022-12-29 PROCEDURE — 93798 PHYS/QHP OP CAR RHAB W/ECG: CPT

## 2023-01-03 ENCOUNTER — HOSPITAL ENCOUNTER (OUTPATIENT)
Dept: CARDIAC REHAB | Age: 68
Discharge: HOME | End: 2023-01-03
Payer: MEDICARE

## 2023-01-03 VITALS — WEIGHT: 196.2 LBS | BODY MASS INDEX: 32.65 KG/M2

## 2023-01-03 PROCEDURE — 93798 PHYS/QHP OP CAR RHAB W/ECG: CPT

## 2023-01-05 ENCOUNTER — HOSPITAL ENCOUNTER (OUTPATIENT)
Dept: CARDIAC REHAB | Age: 68
Discharge: HOME | End: 2023-01-05
Payer: MEDICARE

## 2023-01-05 VITALS — WEIGHT: 197.2 LBS | BODY MASS INDEX: 32.82 KG/M2

## 2023-01-05 PROCEDURE — 93798 PHYS/QHP OP CAR RHAB W/ECG: CPT

## 2023-01-10 ENCOUNTER — HOSPITAL ENCOUNTER (OUTPATIENT)
Dept: CARDIAC REHAB | Age: 68
Discharge: HOME | End: 2023-01-10
Payer: MEDICARE

## 2023-01-10 VITALS — BODY MASS INDEX: 32.48 KG/M2 | WEIGHT: 195.2 LBS

## 2023-01-10 PROCEDURE — 93798 PHYS/QHP OP CAR RHAB W/ECG: CPT

## 2023-01-11 ENCOUNTER — OFFICE VISIT (OUTPATIENT)
Dept: ENT CLINIC | Age: 68
End: 2023-01-11
Payer: MEDICARE

## 2023-01-11 DIAGNOSIS — H90.3 SENSORINEURAL HEARING LOSS (SNHL) OF BOTH EARS: Primary | ICD-10-CM

## 2023-01-11 PROCEDURE — V5299 HEARING SERVICE: HCPCS | Performed by: AUDIOLOGIST

## 2023-01-11 NOTE — PROGRESS NOTES
Pt. Name: Rashid Ruff   : 1955   MRN: [id number]     Appointment type: [facility]io [facility]ano Impression  Patient was seen today for bilateral earmold impressions. Patient was last seen for hearing aid check on 2022 with Yu [employer]GAD[employer] Insera 600 CIC hearing aids. At that time, patient had expressed interest in upgrading technology. Patient has a $2500 benefit through his secondary [employer] which was set to renew in ; recommended earmold impression closer to that time to ensure good fit with new devices. At that time, had provided price quote to patient for Phonak Virto P50 ITC hearing aids. Patient reports today that he received a call from [facility] (where his current aids were purchased) to let him know that they were still under warranty. Confirmed in my records that warranty period ends 3-. Discussed options with patient including continuing care with [facility] or purchasing new aids through our office using his insurance benefit. Patient stated that he was unsure at this point what to do. Offered to take earmold impressions today and hold in office for 1 month, with plan to call patient in February to see what he would like to do. Patient agreed with this plan. After informed verbal consent was obtained, bilateral earmold impressions were obtained without incident. Will hold impressions in the office and will plan to call patient next month to see if he would like to go ahead with ordering new hearing aids through our office. Patient inquired about Vaishnavi hearing devices today after seeing ads for them online; discussed that Vaishnavi is an online-sales model and is only suitable for certain losses. Discussed that due to degree of patient's hearing loss as well as his history with \"prescription\" hearing aids it would be advisable to continue with this model rather than try an amplifier like Vaishnavi. Patient indicated understanding.      Also discussed hearing safety today regarding hunting; reviewed with patient that hearing protection would always be recommended over hearing aids while hunting in order to preserve remaining hearing. Discussed option to try custom earplugs for ease of insertion/removal while hunting; patient will consider. Plan: Call patient in February to determine next steps regarding new hearing aids; will hold impressions in office in the meantime and bill if patient elects to proceed with new technology.        Eliezer James   Doctor of Audiology

## 2023-01-12 ENCOUNTER — HOSPITAL ENCOUNTER (OUTPATIENT)
Dept: CARDIAC REHAB | Age: 68
Discharge: HOME | End: 2023-01-12
Payer: MEDICARE

## 2023-01-12 VITALS — WEIGHT: 195 LBS | BODY MASS INDEX: 32.45 KG/M2

## 2023-01-12 PROCEDURE — 93798 PHYS/QHP OP CAR RHAB W/ECG: CPT

## 2023-01-17 ENCOUNTER — HOSPITAL ENCOUNTER (OUTPATIENT)
Dept: CARDIAC REHAB | Age: 68
Discharge: HOME | End: 2023-01-17
Payer: MEDICARE

## 2023-01-17 VITALS — WEIGHT: 193.8 LBS | BODY MASS INDEX: 32.25 KG/M2

## 2023-01-17 PROCEDURE — 93798 PHYS/QHP OP CAR RHAB W/ECG: CPT

## 2023-01-19 ENCOUNTER — HOSPITAL ENCOUNTER (OUTPATIENT)
Dept: CARDIAC REHAB | Age: 68
Discharge: HOME | End: 2023-01-19
Payer: MEDICARE

## 2023-01-19 VITALS — WEIGHT: 193 LBS | BODY MASS INDEX: 32.12 KG/M2

## 2023-01-19 PROCEDURE — 93798 PHYS/QHP OP CAR RHAB W/ECG: CPT

## 2023-01-24 ENCOUNTER — HOSPITAL ENCOUNTER (OUTPATIENT)
Dept: CARDIAC REHAB | Age: 68
Discharge: HOME | End: 2023-01-24
Payer: MEDICARE

## 2023-01-24 VITALS — BODY MASS INDEX: 32.45 KG/M2 | WEIGHT: 195 LBS

## 2023-01-24 PROCEDURE — 93798 PHYS/QHP OP CAR RHAB W/ECG: CPT

## 2023-01-26 ENCOUNTER — HOSPITAL ENCOUNTER (OUTPATIENT)
Dept: CARDIAC REHAB | Age: 68
Discharge: HOME | End: 2023-01-26
Payer: MEDICARE

## 2023-01-26 VITALS — BODY MASS INDEX: 32.45 KG/M2 | WEIGHT: 195 LBS

## 2023-01-26 PROCEDURE — 93798 PHYS/QHP OP CAR RHAB W/ECG: CPT

## 2023-01-26 NOTE — CARDIO/PULMONARY
[employer]  Completed phase II cardiac rehab and attended 36 sessions from 9/27/22-1/26/23. Magali Hammans is interested in maintaining optimal health and [facility]   [facility] Magali Hammans has improved his endurance and stamina through regular exercise, lost 5 lbs and 4.5 inches from his waist. Blood pressure is 125/75 and is WNL. He has also improved his nutrition, [employer] and depression scores and these were reviewed with patient. [employer] plans to continue exercising at home and has set revised goals that include maintaining exercise routine using stationary bike and walking for at least 3-4 days/week for 20-30 minutes. Patient plans to continue resistance training with bands at home as well 2-3 days/week.   Watson Jean  1/26/2023

## 2023-01-31 ENCOUNTER — APPOINTMENT (OUTPATIENT)
Dept: CARDIAC REHAB | Age: 68
End: 2023-01-31
Payer: MEDICARE

## 2023-02-10 ENCOUNTER — TELEPHONE (OUTPATIENT)
Dept: ENT CLINIC | Age: 68
End: 2023-02-10

## 2023-02-10 NOTE — TELEPHONE ENCOUNTER
Called pt to check in on decision to order new hearing aids as discussed on his appointment on 1-. Patient states that his current hearing aid dispenser ([facility]) is rebranded as [facility] and he has a check up with them in the next week. Asked if he could give a call back after his appointment to touch base and let me know his decision. Will wait to hear from patient regarding ordering new aids.

## 2023-06-06 ENCOUNTER — OFFICE VISIT (OUTPATIENT)
Age: 68
End: 2023-06-06
Payer: MEDICARE

## 2023-06-06 VITALS
DIASTOLIC BLOOD PRESSURE: 82 MMHG | SYSTOLIC BLOOD PRESSURE: 140 MMHG | RESPIRATION RATE: 17 BRPM | WEIGHT: 195 LBS | HEIGHT: 65 IN | OXYGEN SATURATION: 97 % | HEART RATE: 100 BPM | BODY MASS INDEX: 32.49 KG/M2

## 2023-06-06 DIAGNOSIS — H90.3 SENSORINEURAL HEARING LOSS, BILATERAL: Primary | ICD-10-CM

## 2023-06-06 PROCEDURE — 3079F DIAST BP 80-89 MM HG: CPT | Performed by: OTOLARYNGOLOGY

## 2023-06-06 PROCEDURE — 3077F SYST BP >= 140 MM HG: CPT | Performed by: OTOLARYNGOLOGY

## 2023-06-06 PROCEDURE — 3017F COLORECTAL CA SCREEN DOC REV: CPT | Performed by: OTOLARYNGOLOGY

## 2023-06-06 PROCEDURE — 99213 OFFICE O/P EST LOW 20 MIN: CPT | Performed by: OTOLARYNGOLOGY

## 2023-06-06 PROCEDURE — G8417 CALC BMI ABV UP PARAM F/U: HCPCS | Performed by: OTOLARYNGOLOGY

## 2023-06-06 PROCEDURE — G8427 DOCREV CUR MEDS BY ELIG CLIN: HCPCS | Performed by: OTOLARYNGOLOGY

## 2023-06-06 PROCEDURE — 1036F TOBACCO NON-USER: CPT | Performed by: OTOLARYNGOLOGY

## 2023-06-06 PROCEDURE — 1123F ACP DISCUSS/DSCN MKR DOCD: CPT | Performed by: OTOLARYNGOLOGY

## 2023-06-06 ASSESSMENT — ENCOUNTER SYMPTOMS
CHOKING: 0
RHINORRHEA: 0
SORE THROAT: 0
SINUS PAIN: 0
VOICE CHANGE: 0
VOMITING: 0
COUGH: 0
APNEA: 0
TROUBLE SWALLOWING: 0
EYE DISCHARGE: 0
ABDOMINAL PAIN: 0
SHORTNESS OF BREATH: 0
NAUSEA: 0
BACK PAIN: 0
STRIDOR: 0
SINUS PRESSURE: 0
EYE ITCHING: 0

## 2023-06-06 NOTE — PROGRESS NOTES
Positive for arthralgias, gait problem, joint swelling and myalgias. Negative for back pain, neck pain and neck stiffness. Skin:  Negative for rash and wound. Allergic/Immunologic: Negative for environmental allergies and food allergies. Neurological:  Negative for dizziness, speech difficulty, light-headedness and headaches. Hematological:  Negative for adenopathy. Does not bruise/bleed easily. Psychiatric/Behavioral:  Negative for confusion and sleep disturbance. The patient is not nervous/anxious. Past Medical History:   Diagnosis Date    CAD (coronary artery disease)     pt denies    Diabetes ([facility] [address]     GERD (gastroesophageal reflux disease)     Hypertension     Sleep apnea     CPAP    Thyroid disease      Prior to Admission medications    Medication Sig Start Date End Date Taking? Authorizing Provider   albuterol sulfate HFA (PROVENTIL;VENTOLIN;PROAIR) 108 (90 Base) MCG/ACT inhaler TAKE 2 INHALATIONS UP TO 4X A DAY AS NEEDED FOR SHORTNESS OF BREATH. 4/14/22   Ar Automatic Reconciliation   amLODIPine (NORVASC) 10 MG tablet Take 1 tablet by mouth daily    Ar Automatic Reconciliation   aspirin 81 MG EC tablet Take 1 tablet by mouth daily    Ar Automatic Reconciliation   atorvastatin (LIPITOR) 80 MG tablet Take 1 tablet by mouth daily    Ar Automatic Reconciliation   Cholecalciferol 50 MCG (2000 UT) TABS Take 2,000 Int'l Units by mouth    Ar Automatic Reconciliation   dapagliflozin (FARXIGA) 10 MG tablet Take 1 tablet by mouth daily    Ar Automatic Reconciliation   gabapentin (NEURONTIN) 300 MG capsule Take 1 capsule by mouth nightly.     Ar Automatic Reconciliation   hydrALAZINE (APRESOLINE) 100 MG tablet Take 1 tablet by mouth 2 times daily    Ar Automatic Reconciliation   hydroCHLOROthiazide (HYDRODIURIL) 25 MG tablet ceived the following from [facility].  - [facility]: Outside name: hydroCHLOROthiazide (HYDRODIURIL) 25 mg tablet    Ar Automatic Reconciliation   latanoprost (XALATAN) 0.005 %

## 2023-08-16 ENCOUNTER — HOSPITAL ENCOUNTER (EMERGENCY)
Facility: HOSPITAL | Age: 68
Discharge: HOME | End: 2023-08-16
Payer: MEDICARE

## 2023-08-16 VITALS
SYSTOLIC BLOOD PRESSURE: 128 MMHG | DIASTOLIC BLOOD PRESSURE: 66 MMHG | HEIGHT: 65 IN | BODY MASS INDEX: 34.16 KG/M2 | WEIGHT: 205 LBS | HEART RATE: 109 BPM | RESPIRATION RATE: 18 BRPM | TEMPERATURE: 99.2 F | OXYGEN SATURATION: 96 %

## 2023-08-16 DIAGNOSIS — R52 BODY ACHES: ICD-10-CM

## 2023-08-16 DIAGNOSIS — R09.81 NASAL CONGESTION: Primary | ICD-10-CM

## 2023-08-16 LAB
FLUAV AG NPH QL IA: NEGATIVE
FLUBV AG NOSE QL IA: NEGATIVE

## 2023-08-16 PROCEDURE — 87804 INFLUENZA ASSAY W/OPTIC: CPT

## 2023-08-16 PROCEDURE — 99283 EMERGENCY DEPT VISIT LOW MDM: CPT

## 2023-08-16 PROCEDURE — 6370000000 HC RX 637 (ALT 250 FOR IP)

## 2023-08-16 PROCEDURE — 87635 SARS-COV-2 COVID-19 AMP PRB: CPT

## 2023-08-16 RX ORDER — ACETAMINOPHEN 500 MG
1000 TABLET ORAL
Status: COMPLETED | OUTPATIENT
Start: 2023-08-16 | End: 2023-08-16

## 2023-08-16 RX ORDER — GUAIFENESIN 600 MG/1
600 TABLET, EXTENDED RELEASE ORAL 2 TIMES DAILY
Qty: 30 TABLET | Refills: 0 | Status: SHIPPED | OUTPATIENT
Start: 2023-08-16 | End: 2023-08-31

## 2023-08-16 RX ORDER — LORATADINE 10 MG/1
10 TABLET ORAL DAILY
Qty: 30 TABLET | Refills: 0 | Status: SHIPPED | OUTPATIENT
Start: 2023-08-16

## 2023-08-16 RX ADMIN — ACETAMINOPHEN 1000 MG: 500 TABLET ORAL at 21:29

## 2023-08-16 ASSESSMENT — LIFESTYLE VARIABLES
HOW OFTEN DO YOU HAVE A DRINK CONTAINING ALCOHOL: NEVER
HOW MANY STANDARD DRINKS CONTAINING ALCOHOL DO YOU HAVE ON A TYPICAL DAY: PATIENT DOES NOT DRINK

## 2023-08-16 ASSESSMENT — PAIN SCALES - GENERAL: PAINLEVEL_OUTOF10: 4

## 2023-08-16 ASSESSMENT — PAIN DESCRIPTION - LOCATION: LOCATION: GENERALIZED

## 2023-08-16 ASSESSMENT — PAIN - FUNCTIONAL ASSESSMENT: PAIN_FUNCTIONAL_ASSESSMENT: 0-10

## 2023-08-17 LAB
SARS-COV-2 RNA RESP QL NAA+PROBE: DETECTED
SOURCE: ABNORMAL

## 2023-08-17 NOTE — DISCHARGE INSTRUCTIONS
Thank you! Thank you for allowing me to care for you in the emergency department. It is my goal to provide you with excellent care. If you have not received excellent quality care, please ask to speak to the nurse manager. Please fill out the survey that will come to you by mail or email since we listen to your feedback! Below you will find a list of your tests from today's visit. Should you have any questions, please do not hesitate to call the emergency department. Labs  Recent Results (from the past 12 hour(s))   Rapid influenza A/B antigens    Collection Time: 08/16/23  9:45 PM    Specimen: Nasal Washing   Result Value Ref Range    Influenza A Ag Negative Negative      Influenza B Ag Negative Negative         Radiologic Studies  No orders to display     [unfilled]  [unfilled]  ------------------------------------------------------------------------------------------------------------  The exam and treatment you received in the Emergency Department were for an urgent problem and are not intended as complete care. It is important that you follow-up with a doctor, nurse practitioner, or physician assistant to:  (1) confirm your diagnosis,  (2) re-evaluation of changes in your illness and treatment, and  (3) for ongoing care. Please take your discharge instructions with you when you go to your follow-up appointment. If you have any problem arranging a follow-up appointment, contact the Emergency Department. If your symptoms become worse or you do not improve as expected and you are unable to reach your health care provider, please return to the Emergency Department. We are available 24 hours a day. If a prescription has been provided, please have it filled as soon as possible to prevent a delay in treatment. If you have any questions or reservations about taking the medication due to side effects or interactions with other medications, please call your primary care provider or contact the ER.

## 2023-08-17 NOTE — ED TRIAGE NOTES
Patient presents with Chills, aches and nasal congestion. Symptoms began couple days ago. Denies fever. No nausea.

## 2024-03-05 ENCOUNTER — APPOINTMENT (OUTPATIENT)
Facility: HOSPITAL | Age: 69
End: 2024-03-05
Payer: MEDICARE

## 2024-03-26 ENCOUNTER — HOSPITAL ENCOUNTER (OUTPATIENT)
Facility: HOSPITAL | Age: 69
Setting detail: RECURRING SERIES
Discharge: HOME | End: 2024-03-29
Payer: MEDICARE

## 2024-03-26 PROCEDURE — 97161 PT EVAL LOW COMPLEX 20 MIN: CPT | Performed by: PHYSICAL THERAPIST

## 2024-03-26 NOTE — THERAPY EVALUATION
Objective/Functional Outcome Measure:   AM-PAC: 54.90%  AM-PAC score = an established functional score where 0% = no disability       0 min [x]Eval - untimed                        Therapeutic Procedures:  Tx Min Billable or 1:1 Min (if diff from Tx Min) Procedure, Rationale, Specifics         Details if applicable:           Details if applicable:           Details if applicable:           Details if applicable:           Details if applicable:     0 0    Total Total             [x]  Patient Education billed concurrently with other procedures   [x] Review HEP    [] Progressed/Changed HEP, detail:    [] Other detail:         Pain Level at end of session (0-10 scale): 5    Plan of Care / Statement of Necessity for Physical Therapy Services     Assessment / key information:  68 yo male with chief complaint of right lateral hip pain with referral into right lateral thigh.  Objective findings are indicative of lumbar dysfunction.  Will benefit from skilled PT intervention as an adjunct to spine specialist.     Evaluation Complexity:  History:  MEDIUM  Complexity : 1-2 comorbidities / personal factors will impact the outcome/ POC ; Examination:  LOW Complexity : 1-2 Standardized tests and measures addressing body structure, function, activity limitation and / or participation in recreation  ;Presentation:  LOW Complexity : Stable, uncomplicated  ;Clinical Decision Making:  Other outcome measures [facility] 54%  MEDIUM Overall Complexity Rating: LOW   Problem List: pain affecting function, decrease strength, and impaired gait/balance   Treatment Plan may include any combination of the followin Therapeutic Exercise, 23394 Neuromuscular Re-Education, 94004 Manual Therapy, and 51871 Therapeutic Activity  Patient / Family readiness to learn indicated by: asking questions and interest  Persons(s) to be included in education: patient (P)  Barriers to Learning/Limitations: none  Measures taken if barriers to learning 
learning present: na  Patient Self Reported Health Status: good  Rehabilitation Potential: good    Short Term Goals: To be accomplished in 8 treatments.   Independent with HEP   Improve quality of gait on even surfaces   Stand 15 minutes without pain > 5/10  Long Term Goals: To be accomplished in 20 treatments.   Speed of Gait: > 1.0 m/s   Patient can walk or stand 30 minutes with pain < 5/10     Frequency / Duration: Patient to be seen 2 times per week for 20 treatments.    Patient/ Caregiver education and instruction: Diagnosis, prognosis, exercises , posture/body mechanics.   [x]  Plan of care has been reviewed with PTA      Certification Period: 3/26/2024 to 6/26/2024 April Letharing Page, PT       3/26/2024       11:24 AM        ===================================================================  I certify that the above Therapy Services are being furnished while the patient is under my care. I agree with the treatment plan and certify that this therapy is necessary.    Physician's Signature:_________________________   DATE:_________   TIME:________                           Ko Sotomayor MD    ** Signature, Date and Time must be completed for valid certification **  Please sign and fax to [phone].  Thank you

## 2024-03-29 ENCOUNTER — HOSPITAL ENCOUNTER (OUTPATIENT)
Facility: HOSPITAL | Age: 69
Setting detail: RECURRING SERIES
Discharge: HOME | End: 2024-04-01
Payer: MEDICARE

## 2024-03-29 PROCEDURE — 97110 THERAPEUTIC EXERCISES: CPT

## 2024-03-29 NOTE — PROGRESS NOTES
Noted to have bilateral lower extremity swelling.  Likely related to IVF administration for sepsis  No history of CHF, last known EF from Echo in January '23 shows an EF of 65% with normal diastolic function.  Echo (1/25/24): The left ventricular ejection fraction is 65%. Systolic function is normal. Wall motion is normal. Diastolic function is mildly abnormal, consistent with grade I (abnormal) relaxation.   Continue 40 mg PO Lasix daily.  Ace wraps for compression to BLE  Chest x-ray with atelectasis, trace pleural effusions   Therapeutic Activity (timed):  use of dynamic activities replicating functional movements to increase ROM, strength, coordination, balance, and proprioception in order to improve patient's ability to progress to PLOF and address remaining functional goals.  (see flow sheet as applicable)    Details if applicable:           Details if applicable:            Details if applicable:     40     Total Total         [x]  Patient Education billed concurrently with other procedures   [x] Review HEP    [] Progressed/Changed HEP, detail:    [] Other detail:         Other Objective/Functional Measures      Pain Level at end of session (0-10 scale): 5      Assessment   Pt tolerated session fairly. Seated rest breaks after two standing TE. RLE will give out without warning, per patient.   Patient will continue to benefit from skilled PT / OT services to modify and progress therapeutic interventions, analyze and address functional mobility deficits, analyze and address ROM deficits, and analyze and address strength deficits to address functional deficits and attain remaining goals.    Progress toward goals / Updated goals:  []  See Progress Note/Recertification    Short Term Goals: To be accomplished in 8 treatments.   Independent with HEP   Improve quality of gait on even surfaces   Stand 15 minutes without pain > 5/10  Long Term Goals: To be accomplished in 20 treatments.   Speed of Gait: > 1.0 m/s   Patient can walk or stand 30 minutes with pain < 5/10       PLAN  Yes  Continue plan of care  Re-Cert Due: 6/26/24  [x]  Upgrade activities as tolerated  []  Discharge due to:  []  Other:      JAMES MARKS JR, PTA       3/29/2024       12:25 PM

## 2024-04-03 ENCOUNTER — HOSPITAL ENCOUNTER (OUTPATIENT)
Facility: HOSPITAL | Age: 69
Setting detail: RECURRING SERIES
Discharge: HOME | End: 2024-04-06
Payer: MEDICARE

## 2024-04-03 PROCEDURE — 97110 THERAPEUTIC EXERCISES: CPT | Performed by: PHYSICAL THERAPIST

## 2024-04-03 NOTE — PROGRESS NOTES
flow sheet as applicable)     Details if applicable:       57247 Therapeutic Activity (timed):  use of dynamic activities replicating functional movements to increase ROM, strength, coordination, balance, and proprioception in order to improve patient's ability to progress to PLOF and address remaining functional goals.  (see flow sheet as applicable)    Details if applicable:           Details if applicable:            Details if applicable:     46     Total Total         [x]  Patient Education billed concurrently with other procedures   [x] Review HEP    [] Progressed/Changed HEP, detail:    [] Other detail:         Other Objective/Functional Measures      Pain Level at end of session (0-10 scale): 5      Assessment   Good return of today's program.   Limited standing due to increase in symptoms.   Overall, good effort.   Patient will continue to benefit from skilled PT / OT services to modify and progress therapeutic interventions, analyze and address functional mobility deficits, analyze and address ROM deficits, and analyze and address strength deficits to address functional deficits and attain remaining goals.    Progress toward goals / Updated goals:  []  See Progress Note/Recertification    Short Term Goals: To be accomplished in 8 treatments.   Independent with HEP   Improve quality of gait on even surfaces   Stand 15 minutes without pain > 5/10  Long Term Goals: To be accomplished in 20 treatments.   Speed of Gait: > 1.0 m/s   Patient can walk or stand 30 minutes with pain < 5/10       PLAN  Yes  Continue plan of care  Re-Cert Due: 6/26/24  [x]  Upgrade activities as tolerated  []  Discharge due to:  []  Other:      Rajwinder Woo, PT       4/3/2024       11:41 AM

## 2024-04-05 ENCOUNTER — HOSPITAL ENCOUNTER (OUTPATIENT)
Facility: HOSPITAL | Age: 69
Setting detail: RECURRING SERIES
Discharge: HOME | End: 2024-04-08
Payer: MEDICARE

## 2024-04-05 PROCEDURE — 97110 THERAPEUTIC EXERCISES: CPT

## 2024-04-05 NOTE — PROGRESS NOTES
PHYSICAL THERAPY - MEDICARE DAILY TREATMENT NOTE (updated 3/23)      Date: 2024          Patient Name:  Jesu Schrader :  1955   Medical   Diagnosis:  Radiculopathy, lumbosacral region [M54.17] Treatment Diagnosis:  Radiculopathy, lumbosacral region [M54.17]   Referral Source:  Ko Sotomayor MD Insurance:   Payor: MEDICARE / Plan: MEDICARE PART A AND B / Product Type: *No Product type* /                     Patient  verified yes     Visit #   Current  / Total 4 50   Time   In / Out 1025 am  1110 am    Total Treatment Time 45   Total Timed Codes 40   1:1 Treatment Time 40      [facility] Totals Reminder:  bill using total billable   min of TIMED therapeutic procedures and modalities.   8-22 min = 1 unit; 23-37 min = 2 units; 38-52 min = 3 units; 53-67 min = 4 units; 68-82 min = 5 units            SUBJECTIVE    Pain Level (0-10 scale): 0    Any medication changes, allergies to medications, adverse drug reactions, diagnosis change, or new procedure performed?: [x] No    [] Yes (see summary sheet for update)  Medications: Verified on Patient Summary List    Subjective functional status/changes:     \"Got my injection yesterday, feeling good today\"    OBJECTIVE      Therapeutic Procedures:  Tx Min Billable or 1:1 Min (if diff from Tx Min) Procedure, Rationale, Specifics   40  76031 Therapeutic Exercise (timed):  increase ROM, strength, coordination, balance, and proprioception to improve patient's ability to progress to PLOF and address remaining functional goals. (see flow sheet as applicable)     Details if applicable:       39658 Neuromuscular Re-Education (timed):  improve balance, coordination, kinesthetic sense, posture, core stability and proprioception to improve patient's ability to develop conscious control of individual muscles and awareness of position of extremities in order to progress to PLOF and address remaining functional goals. (see flow sheet as applicable)     Details if

## 2024-04-10 ENCOUNTER — HOSPITAL ENCOUNTER (OUTPATIENT)
Facility: HOSPITAL | Age: 69
Setting detail: RECURRING SERIES
Discharge: HOME | End: 2024-04-13
Payer: MEDICARE

## 2024-04-10 PROCEDURE — 97110 THERAPEUTIC EXERCISES: CPT

## 2024-04-10 NOTE — PROGRESS NOTES
PHYSICAL THERAPY - MEDICARE DAILY TREATMENT NOTE (updated 3/23)      Date: 4/10/2024          Patient Name:  Jesu Schrader :  1955   Medical   Diagnosis:  Radiculopathy, lumbosacral region [M54.17] Treatment Diagnosis:  Radiculopathy, lumbosacral region [M54.17]   Referral Source:  Ko Sotomayor MD Insurance:   Payor: MEDICARE / Plan: MEDICARE PART A AND B / Product Type: *No Product type* /                     Patient  verified yes     Visit #   Current  / Total 5 50   Time   In / Out 10 am  1045 am    Total Treatment Time 45   Total Timed Codes 41   1:1 Treatment Time 41      [facility] Totals Reminder:  bill using total billable   min of TIMED therapeutic procedures and modalities.   8-22 min = 1 unit; 23-37 min = 2 units; 38-52 min = 3 units; 53-67 min = 4 units; 68-82 min = 5 units            SUBJECTIVE    Pain Level (0-10 scale): 0    Any medication changes, allergies to medications, adverse drug reactions, diagnosis change, or new procedure performed?: [x] No    [] Yes (see summary sheet for update)  Medications: Verified on Patient Summary List    Subjective functional status/changes:     Still feeling good.     OBJECTIVE      Therapeutic Procedures:  Tx Min Billable or 1:1 Min (if diff from Tx Min) Procedure, Rationale, Specifics   41  88907 Therapeutic Exercise (timed):  increase ROM, strength, coordination, balance, and proprioception to improve patient's ability to progress to PLOF and address remaining functional goals. (see flow sheet as applicable)     Details if applicable:       73727 Neuromuscular Re-Education (timed):  improve balance, coordination, kinesthetic sense, posture, core stability and proprioception to improve patient's ability to develop conscious control of individual muscles and awareness of position of extremities in order to progress to PLOF and address remaining functional goals. (see flow sheet as applicable)     Details if applicable:       59619 Therapeutic

## 2024-04-12 ENCOUNTER — HOSPITAL ENCOUNTER (OUTPATIENT)
Facility: HOSPITAL | Age: 69
Setting detail: RECURRING SERIES
Discharge: HOME | End: 2024-04-15
Payer: MEDICARE

## 2024-04-12 PROCEDURE — 97110 THERAPEUTIC EXERCISES: CPT

## 2024-04-12 NOTE — PROGRESS NOTES
PHYSICAL THERAPY - MEDICARE DAILY TREATMENT NOTE (updated 3/23)      Date: 2024          Patient Name:  Jesu Schrader :  1955   Medical   Diagnosis:  Radiculopathy, lumbosacral region [M54.17] Treatment Diagnosis:  Radiculopathy, lumbosacral region [M54.17]   Referral Source:  Ko Sotomayor MD Insurance:   Payor: MEDICARE / Plan: MEDICARE PART A AND B / Product Type: *No Product type* /                     Patient  verified yes     Visit #   Current  / Total 6 50   Time   In / Out 1030 am  1115 am    Total Treatment Time 45   Total Timed Codes 39   1:1 Treatment Time 39      [facility] Totals Reminder:  bill using total billable   min of TIMED therapeutic procedures and modalities.   8-22 min = 1 unit; 23-37 min = 2 units; 38-52 min = 3 units; 53-67 min = 4 units; 68-82 min = 5 units            SUBJECTIVE    Pain Level (0-10 scale): 0    Any medication changes, allergies to medications, adverse drug reactions, diagnosis change, or new procedure performed?: [x] No    [] Yes (see summary sheet for update)  Medications: Verified on Patient Summary List    Subjective functional status/changes:     No pain, just soreness.     OBJECTIVE      Therapeutic Procedures:  Tx Min Billable or 1:1 Min (if diff from Tx Min) Procedure, Rationale, Specifics   39  39096 Therapeutic Exercise (timed):  increase ROM, strength, coordination, balance, and proprioception to improve patient's ability to progress to PLOF and address remaining functional goals. (see flow sheet as applicable)     Details if applicable:       58037 Neuromuscular Re-Education (timed):  improve balance, coordination, kinesthetic sense, posture, core stability and proprioception to improve patient's ability to develop conscious control of individual muscles and awareness of position of extremities in order to progress to PLOF and address remaining functional goals. (see flow sheet as applicable)     Details if applicable:       76745

## 2024-04-17 ENCOUNTER — APPOINTMENT (OUTPATIENT)
Facility: HOSPITAL | Age: 69
End: 2024-04-17
Payer: MEDICARE

## 2024-04-17 ENCOUNTER — HOSPITAL ENCOUNTER (OUTPATIENT)
Facility: HOSPITAL | Age: 69
Setting detail: RECURRING SERIES
Discharge: HOME | End: 2024-04-20
Payer: MEDICARE

## 2024-04-17 PROCEDURE — 97110 THERAPEUTIC EXERCISES: CPT

## 2024-04-17 NOTE — PROGRESS NOTES
PHYSICAL THERAPY - MEDICARE DAILY TREATMENT NOTE (updated 3/23)      Date: 2024          Patient Name:  Jesu Schrader :  1955   Medical   Diagnosis:  Radiculopathy, lumbosacral region [M54.17] Treatment Diagnosis:  Radiculopathy, lumbosacral region [M54.17]   Referral Source:  Ko Sotomayor MD Insurance:   Payor: MEDICARE / Plan: MEDICARE PART A AND B / Product Type: *No Product type* /                     Patient  verified yes     Visit #   Current  / Total 7 50   Time   In / Out 10 am  1045 am    Total Treatment Time 45   Total Timed Codes 39   1:1 Treatment Time 39      [facility] Totals Reminder:  bill using total billable   min of TIMED therapeutic procedures and modalities.   8-22 min = 1 unit; 23-37 min = 2 units; 38-52 min = 3 units; 53-67 min = 4 units; 68-82 min = 5 units            SUBJECTIVE    Pain Level (0-10 scale): 2    Any medication changes, allergies to medications, adverse drug reactions, diagnosis change, or new procedure performed?: [x] No    [] Yes (see summary sheet for update)  Medications: Verified on Patient Summary List    Subjective functional status/changes:     No pain, just soreness.     OBJECTIVE      Therapeutic Procedures:  Tx Min Billable or 1:1 Min (if diff from Tx Min) Procedure, Rationale, Specifics   39  08331 Therapeutic Exercise (timed):  increase ROM, strength, coordination, balance, and proprioception to improve patient's ability to progress to PLOF and address remaining functional goals. (see flow sheet as applicable)     Details if applicable:       99903 Neuromuscular Re-Education (timed):  improve balance, coordination, kinesthetic sense, posture, core stability and proprioception to improve patient's ability to develop conscious control of individual muscles and awareness of position of extremities in order to progress to PLOF and address remaining functional goals. (see flow sheet as applicable)     Details if applicable:       32582 Therapeutic

## 2024-04-19 ENCOUNTER — APPOINTMENT (OUTPATIENT)
Facility: HOSPITAL | Age: 69
End: 2024-04-19
Payer: MEDICARE

## 2024-04-23 ENCOUNTER — HOSPITAL ENCOUNTER (OUTPATIENT)
Facility: HOSPITAL | Age: 69
Setting detail: RECURRING SERIES
Discharge: HOME | End: 2024-04-26
Payer: MEDICARE

## 2024-04-23 PROCEDURE — 97110 THERAPEUTIC EXERCISES: CPT

## 2024-04-23 NOTE — PROGRESS NOTES
PHYSICAL THERAPY - MEDICARE DAILY TREATMENT NOTE (updated 3/23)      Date: 2024          Patient Name:  Jesu Schrader :  1955   Medical   Diagnosis:  Radiculopathy, lumbosacral region [M54.17] Treatment Diagnosis:  Radiculopathy, lumbosacral region [M54.17]   Referral Source:  Ko Sotomayor MD Insurance:   Payor: MEDICARE / Plan: MEDICARE PART A AND B / Product Type: *No Product type* /                     Patient  verified yes     Visit #   Current  / Total 8 50   Time   In / Out 945 am  1030 am    Total Treatment Time 45   Total Timed Codes 40   1:1 Treatment Time 40      [facility] Totals Reminder:  bill using total billable   min of TIMED therapeutic procedures and modalities.   8-22 min = 1 unit; 23-37 min = 2 units; 38-52 min = 3 units; 53-67 min = 4 units; 68-82 min = 5 units            SUBJECTIVE    Pain Level (0-10 scale): 5    Any medication changes, allergies to medications, adverse drug reactions, diagnosis change, or new procedure performed?: [x] No    [] Yes (see summary sheet for update)  Medications: Verified on Patient Summary List    Subjective functional status/changes:     Going to have get surgery    OBJECTIVE      Therapeutic Procedures:  Tx Min Billable or 1:1 Min (if diff from Tx Min) Procedure, Rationale, Specifics   40  88247 Therapeutic Exercise (timed):  increase ROM, strength, coordination, balance, and proprioception to improve patient's ability to progress to PLOF and address remaining functional goals. (see flow sheet as applicable)     Details if applicable:       69663 Neuromuscular Re-Education (timed):  improve balance, coordination, kinesthetic sense, posture, core stability and proprioception to improve patient's ability to develop conscious control of individual muscles and awareness of position of extremities in order to progress to PLOF and address remaining functional goals. (see flow sheet as applicable)     Details if applicable:       06307

## 2024-04-23 NOTE — PROGRESS NOTES
Kelvin Redmond [facility]  [address], [address]  [place] [address]  Phone: [phone]    Fax: [phone]    PHYSICAL THERAPY PROGRESS NOTE  Patient Name:  Jesu Schrader :  1955   Treatment/Medical Diagnosis: Radiculopathy, lumbosacral region [M54.17]   Referral Source:  Ko Sotomayor MD     Date of Initial Visit:  3/26/24 Attended Visits:  8 Missed Visits:  0     SUMMARY OF TREATMENT/ASSESSMENT:  Pt continues to make progress towards his goals. He received an injection two weeks ago which has begun to be ineffective. Per patient he has noticed an improvement with his leg strength and endurance, but standing tolerance remains limited. Pt spoke with MD regarding surgery within his lumbar spine.     AMPAC:23.57%    CURRENT STATUS/GOALS  Short Term Goals: To be accomplished in 8 treatments.   Independent with HEP met 24   Improve quality of gait on even surfaces progressing 24   Stand 15 minutes without pain > 5/10 progressing 24  Long Term Goals: To be accomplished in 20 treatments.   Speed of Gait: > 1.0 m/s progressing 24   Patient can walk or stand 30 minutes with pain < 5/10 not met 24    RECOMMENDATIONS FOR SKILLED THERAPY  Continue POC initiated by PT until all goals are met.          JESU MARKS JR, PTA       2024       9:49 AM    If you have any questions/comments please contact us directly at [phone].   Thank you for allowing us to assist in the care of your patient.

## 2024-04-25 ENCOUNTER — HOSPITAL ENCOUNTER (OUTPATIENT)
Facility: HOSPITAL | Age: 69
Setting detail: RECURRING SERIES
Discharge: HOME | End: 2024-04-28
Payer: MEDICARE

## 2024-04-25 PROCEDURE — 97110 THERAPEUTIC EXERCISES: CPT | Performed by: PHYSICAL THERAPIST

## 2024-04-25 NOTE — PROGRESS NOTES
PHYSICAL THERAPY - MEDICARE DAILY TREATMENT NOTE (updated 3/23)      Date: 2024          Patient Name:  Jesu Schrader :  1955   Medical   Diagnosis:  Radiculopathy, lumbosacral region [M54.17] Treatment Diagnosis:  Radiculopathy, lumbosacral region [M54.17]   Referral Source:  Ko Sotomayor MD Insurance:   Payor: MEDICARE / Plan: MEDICARE PART A AND B / Product Type: *No Product type* /                     Patient  verified yes     Visit #   Current  / Total 9 50   Time   In / Out 1010 am  1100  am    Total Treatment Time 50   Total Timed Codes 48   1:1 Treatment Time 48      [facility] Totals Reminder:  bill using total billable   min of TIMED therapeutic procedures and modalities.   8-22 min = 1 unit; 23-37 min = 2 units; 38-52 min = 3 units; 53-67 min = 4 units; 68-82 min = 5 units            SUBJECTIVE    Pain Level (0-10 scale): 5, low back into right LE.     Any medication changes, allergies to medications, adverse drug reactions, diagnosis change, or new procedure performed?: [x] No    [] Yes (see summary sheet for update)  Medications: Verified on Patient Summary List    Subjective functional status/changes:     Going to have get surgery. Meeting with the surgeon next week.     OBJECTIVE      Therapeutic Procedures:  Tx Min Billable or 1:1 Min (if diff from Tx Min) Procedure, Rationale, Specifics   48  24501 Therapeutic Exercise (timed):  increase ROM, strength, coordination, balance, and proprioception to improve patient's ability to progress to PLOF and address remaining functional goals. (see flow sheet as applicable)     Details if applicable:       05626 Neuromuscular Re-Education (timed):  improve balance, coordination, kinesthetic sense, posture, core stability and proprioception to improve patient's ability to develop conscious control of individual muscles and awareness of position of extremities in order to progress to PLOF and address remaining functional goals. (see flow

## 2024-04-30 ENCOUNTER — HOSPITAL ENCOUNTER (OUTPATIENT)
Facility: HOSPITAL | Age: 69
Setting detail: RECURRING SERIES
Discharge: HOME | End: 2024-05-03
Payer: MEDICARE

## 2024-04-30 PROCEDURE — 97110 THERAPEUTIC EXERCISES: CPT

## 2024-04-30 NOTE — PROGRESS NOTES
sheet as applicable)     Details if applicable:       19022 Therapeutic Activity (timed):  use of dynamic activities replicating functional movements to increase ROM, strength, coordination, balance, and proprioception in order to improve patient's ability to progress to PLOF and address remaining functional goals.  (see flow sheet as applicable)    Details if applicable:           Details if applicable:            Details if applicable:     40     Total Total         [x]  Patient Education billed concurrently with other procedures   [x] Review HEP    [] Progressed/Changed HEP, detail:    [] Other detail:     Access Code: [email or web address]  URL: [email or web address]  Date: 04/05/2024  Prepared by: Jesu Quezada Jr    Exercises  - Clamshell  - 1 x daily - 3 sets - 10 reps  - Sidelying Reverse Clamshell  - 1 x daily - 3 sets - 10 reps  - Supine Single Knee to Chest Stretch  - 1 x daily - 3 sets - 10 reps  - Standing Hip Abduction with Counter Support  - 1 x daily - 3 sets - 10 reps      Other Objective/Functional Measures      Pain Level at end of session (0-10 scale): 4      Assessment   Today's program consisted of alternating standing and sitting activities as to avoid increasing right leg symptoms.   Patient reports modest decrease in symptoms 3-4 hours after PT.   Continuation in contingent on surgeon's evaluation.    Patient will continue to benefit from skilled PT / OT services to modify and progress therapeutic interventions, analyze and address functional mobility deficits, analyze and address ROM deficits, and analyze and address strength deficits to address functional deficits and attain remaining goals.    Progress toward goals / Updated goals:  []  See Progress Note/Recertification    Short Term Goals: To be accomplished in 8 treatments.   Independent with HEP met 4/23/24   Improve quality of gait on even surfaces progressing 4/23/24   Stand 15 minutes without pain > 5/10 progressing 4/23/24  Long Term

## 2024-05-02 ENCOUNTER — APPOINTMENT (OUTPATIENT)
Facility: HOSPITAL | Age: 69
End: 2024-05-02
Payer: MEDICARE

## 2024-05-08 ENCOUNTER — HOSPITAL ENCOUNTER (OUTPATIENT)
Facility: HOSPITAL | Age: 69
Setting detail: RECURRING SERIES
Discharge: HOME | End: 2024-05-11
Payer: MEDICARE

## 2024-05-08 PROCEDURE — 97110 THERAPEUTIC EXERCISES: CPT

## 2024-05-08 NOTE — PROGRESS NOTES
PHYSICAL THERAPY - MEDICARE DAILY TREATMENT NOTE (updated 3/23)      Date: 2024          Patient Name:  Jesu Schrader :  1955   Medical   Diagnosis:  Radiculopathy, lumbosacral region [M54.17] Treatment Diagnosis:  Radiculopathy, lumbosacral region [M54.17]   Referral Source:  Ko Sotomayor MD Insurance:   Payor: MEDICARE / Plan: MEDICARE PART A AND B / Product Type: *No Product type* /                     Patient  verified yes     Visit #   Current  / Total 11 50   Time   In / Out 1030 am  1115  am    Total Treatment Time 45   Total Timed Codes 40   1:1 Treatment Time 40      [facility] Totals Reminder:  bill using total billable   min of TIMED therapeutic procedures and modalities.   8-22 min = 1 unit; 23-37 min = 2 units; 38-52 min = 3 units; 53-67 min = 4 units; 68-82 min = 5 units            SUBJECTIVE    Pain Level (0-10 scale): 4, low back into right LE.     Any medication changes, allergies to medications, adverse drug reactions, diagnosis change, or new procedure performed?: [x] No    [] Yes (see summary sheet for update)  Medications: Verified on Patient Summary List    Subjective functional status/changes:     Been feeling rough this week.     OBJECTIVE      Therapeutic Procedures:  Tx Min Billable or 1:1 Min (if diff from Tx Min) Procedure, Rationale, Specifics   40  75114 Therapeutic Exercise (timed):  increase ROM, strength, coordination, balance, and proprioception to improve patient's ability to progress to PLOF and address remaining functional goals. (see flow sheet as applicable)     Details if applicable:       13547 Neuromuscular Re-Education (timed):  improve balance, coordination, kinesthetic sense, posture, core stability and proprioception to improve patient's ability to develop conscious control of individual muscles and awareness of position of extremities in order to progress to PLOF and address remaining functional goals. (see flow sheet as applicable)     Details if

## 2024-05-10 ENCOUNTER — APPOINTMENT (OUTPATIENT)
Facility: HOSPITAL | Age: 69
End: 2024-05-10
Payer: MEDICARE

## 2024-05-15 ENCOUNTER — HOSPITAL ENCOUNTER (OUTPATIENT)
Facility: HOSPITAL | Age: 69
Setting detail: RECURRING SERIES
Discharge: HOME | End: 2024-05-18
Payer: MEDICARE

## 2024-05-15 PROCEDURE — 97110 THERAPEUTIC EXERCISES: CPT

## 2024-05-15 NOTE — PROGRESS NOTES
applicable:       32236 Therapeutic Activity (timed):  use of dynamic activities replicating functional movements to increase ROM, strength, coordination, balance, and proprioception in order to improve patient's ability to progress to PLOF and address remaining functional goals.  (see flow sheet as applicable)    Details if applicable:           Details if applicable:            Details if applicable:     40     Total Total         [x]  Patient Education billed concurrently with other procedures   [x] Review HEP    [] Progressed/Changed HEP, detail:    [] Other detail:     Access Code: [email or web address]  URL: [email or web address]  Date: 04/05/2024  Prepared by: Jesu Quezada Jr    Exercises  - Clamshell  - 1 x daily - 3 sets - 10 reps  - Sidelying Reverse Clamshell  - 1 x daily - 3 sets - 10 reps  - Supine Single Knee to Chest Stretch  - 1 x daily - 3 sets - 10 reps  - Standing Hip Abduction with Counter Support  - 1 x daily - 3 sets - 10 reps      Other Objective/Functional Measures      Pain Level at end of session (0-10 scale): 4      Assessment   Today's program consisted of alternating standing and sitting activities as to avoid increasing right leg symptoms.   Patient reports modest decrease in symptoms 3-4 hours after PT.   Continuation in contingent on surgeon's evaluation.    Patient will continue to benefit from skilled PT / OT services to modify and progress therapeutic interventions, analyze and address functional mobility deficits, analyze and address ROM deficits, and analyze and address strength deficits to address functional deficits and attain remaining goals.    Progress toward goals / Updated goals:  []  See Progress Note/Recertification    Short Term Goals: To be accomplished in 8 treatments.   Independent with HEP met 4/23/24   Improve quality of gait on even surfaces progressing 4/23/24   Stand 15 minutes without pain > 5/10 progressing 4/23/24  Long Term Goals: To be accomplished in 20

## 2024-05-17 ENCOUNTER — HOSPITAL ENCOUNTER (OUTPATIENT)
Facility: HOSPITAL | Age: 69
Setting detail: RECURRING SERIES
Discharge: HOME | End: 2024-05-20
Payer: MEDICARE

## 2024-05-17 PROCEDURE — 97110 THERAPEUTIC EXERCISES: CPT

## 2024-05-17 NOTE — PROGRESS NOTES
applicable:       94919 Therapeutic Activity (timed):  use of dynamic activities replicating functional movements to increase ROM, strength, coordination, balance, and proprioception in order to improve patient's ability to progress to PLOF and address remaining functional goals.  (see flow sheet as applicable)    Details if applicable:           Details if applicable:            Details if applicable:     39     Total Total         [x]  Patient Education billed concurrently with other procedures   [x] Review HEP    [] Progressed/Changed HEP, detail:    [] Other detail:     Access Code: [email or web address]  URL: [email or web address]  Date: 04/05/2024  Prepared by: Jesu Quezada Jr    Exercises  - Clamshell  - 1 x daily - 3 sets - 10 reps  - Sidelying Reverse Clamshell  - 1 x daily - 3 sets - 10 reps  - Supine Single Knee to Chest Stretch  - 1 x daily - 3 sets - 10 reps  - Standing Hip Abduction with Counter Support  - 1 x daily - 3 sets - 10 reps      Other Objective/Functional Measures      Pain Level at end of session (0-10 scale): 4      Assessment   Today's program consisted of alternating standing and sitting activities as to avoid increasing right leg symptoms.   Patient reports modest decrease in symptoms 3-4 hours after PT.   Continuation in contingent on surgeon's evaluation.    Patient will continue to benefit from skilled PT / OT services to modify and progress therapeutic interventions, analyze and address functional mobility deficits, analyze and address ROM deficits, and analyze and address strength deficits to address functional deficits and attain remaining goals.    Progress toward goals / Updated goals:  []  See Progress Note/Recertification    Short Term Goals: To be accomplished in 8 treatments.   Independent with HEP met 4/23/24   Improve quality of gait on even surfaces progressing 4/23/24   Stand 15 minutes without pain > 5/10 progressing 4/23/24  Long Term Goals: To be accomplished in 20

## 2024-05-24 ENCOUNTER — HOSPITAL ENCOUNTER (OUTPATIENT)
Facility: HOSPITAL | Age: 69
Setting detail: RECURRING SERIES
Discharge: HOME | End: 2024-05-27
Payer: MEDICARE

## 2024-05-24 PROCEDURE — 97110 THERAPEUTIC EXERCISES: CPT

## 2024-05-24 NOTE — PROGRESS NOTES
PHYSICAL THERAPY - MEDICARE DAILY TREATMENT NOTE (updated 3/23)      Date: 2024          Patient Name:  Jesu Schrader :  1955   Medical   Diagnosis:  Radiculopathy, lumbosacral region [M54.17] Treatment Diagnosis:  Radiculopathy, lumbosacral region [M54.17]   Referral Source:  Ko Sotomayor MD Insurance:   Payor: MEDICARE / Plan: MEDICARE PART A AND B / Product Type: *No Product type* /                     Patient  verified yes     Visit #   Current  / Total 14 50   Time   In / Out 1030 am  1155  am    Total Treatment Time 45   Total Timed Codes 39   1:1 Treatment Time 39      [facility] Totals Reminder:  bill using total billable   min of TIMED therapeutic procedures and modalities.   8-22 min = 1 unit; 23-37 min = 2 units; 38-52 min = 3 units; 53-67 min = 4 units; 68-82 min = 5 units            SUBJECTIVE    Pain Level (0-10 scale): 4, low back into right LE.     Any medication changes, allergies to medications, adverse drug reactions, diagnosis change, or new procedure performed?: [x] No    [] Yes (see summary sheet for update)  Medications: Verified on Patient Summary List    Subjective functional status/changes:     Getting surgery on      OBJECTIVE      Therapeutic Procedures:  Tx Min Billable or 1:1 Min (if diff from Tx Min) Procedure, Rationale, Specifics   39  25186 Therapeutic Exercise (timed):  increase ROM, strength, coordination, balance, and proprioception to improve patient's ability to progress to PLOF and address remaining functional goals. (see flow sheet as applicable)     Details if applicable:       58024 Neuromuscular Re-Education (timed):  improve balance, coordination, kinesthetic sense, posture, core stability and proprioception to improve patient's ability to develop conscious control of individual muscles and awareness of position of extremities in order to progress to PLOF and address remaining functional goals. (see flow sheet as applicable)     Details if

## 2024-05-24 NOTE — PROGRESS NOTES
[facility] Red[facility]  [address], [address]  [place] [address]  Phone: [phone]    Fax: [phone]    PHYSICAL THERAPY PROGRESS NOTE  Patient Name:  Jesu Schrader :  1955   Treatment/Medical Diagnosis: Radiculopathy, lumbosacral region [M54.17]   Referral Source:  Ko Sotomayor MD     Date of Initial Visit:  3/26/24 Attended Visits:  14 Missed Visits:  0     SUMMARY OF TREATMENT/ASSESSMENT:  Mr. Schrader continues have pain within his back and hips without any reduction of intensity. He has noticed that his standing tolerance has improved a little but still needs to sit after 10 minutes of standing. He describes his pain as sharp ache that begins on the right side of his lumbar, travels to the left side, and also down his RLE with some tingling noted. He declines any recent instability within his RLE.     CURRENT STATUS/GOALS    Short Term Goals: To be accomplished in 8 treatments.   Independent with HEP met 24   Improve quality of gait on even surfaces progressing 24    Stand 15 minutes without pain > 5/10 progressing 24  Long Term Goals: To be accomplished in 20 treatments.   Speed of Gait: > 1.0 m/s progressing 24 (1.05 m/s)   Patient can walk or stand 30 minutes with pain < 5/10 not met 24      RECOMMENDATIONS FOR SKILLED THERAPY  Continue POC initiated by PT until all goals are met.          JESU MARKS JR, PTA       2024       10:36 AM    If you have any questions/comments please contact us directly at [phone].   Thank you for allowing us to assist in the care of your patient.

## 2024-05-28 ENCOUNTER — HOSPITAL ENCOUNTER (OUTPATIENT)
Facility: HOSPITAL | Age: 69
Setting detail: RECURRING SERIES
Discharge: HOME | End: 2024-05-31
Payer: MEDICARE

## 2024-05-28 PROCEDURE — 97110 THERAPEUTIC EXERCISES: CPT

## 2024-05-28 NOTE — PROGRESS NOTES
PHYSICAL THERAPY - MEDICARE DAILY TREATMENT NOTE (updated 3/23)      Date: 2024          Patient Name:  Jesu Schrader :  1955   Medical   Diagnosis:  Radiculopathy, lumbosacral region [M54.17] Treatment Diagnosis:  Radiculopathy, lumbosacral region [M54.17]   Referral Source:  Ko Sotomayor MD Insurance:   Payor: MEDICARE / Plan: MEDICARE PART A AND B / Product Type: *No Product type* /                     Patient  verified yes     Visit #   Current  / Total 15 50   Time   In / Out 1110 am  1155  am    Total Treatment Time 45   Total Timed Codes 40   1:1 Treatment Time 40      [facility] Totals Reminder:  bill using total billable   min of TIMED therapeutic procedures and modalities.   8-22 min = 1 unit; 23-37 min = 2 units; 38-52 min = 3 units; 53-67 min = 4 units; 68-82 min = 5 units            SUBJECTIVE    Pain Level (0-10 scale): 4, low back into right LE.     Any medication changes, allergies to medications, adverse drug reactions, diagnosis change, or new procedure performed?: [x] No    [] Yes (see summary sheet for update)  Medications: Verified on Patient Summary List    Subjective functional status/changes:     MD gave me this new cream, working pretty good so far.     OBJECTIVE      Therapeutic Procedures:  Tx Min Billable or 1:1 Min (if diff from Tx Min) Procedure, Rationale, Specifics   40  24003 Therapeutic Exercise (timed):  increase ROM, strength, coordination, balance, and proprioception to improve patient's ability to progress to PLOF and address remaining functional goals. (see flow sheet as applicable)     Details if applicable:       44399 Neuromuscular Re-Education (timed):  improve balance, coordination, kinesthetic sense, posture, core stability and proprioception to improve patient's ability to develop conscious control of individual muscles and awareness of position of extremities in order to progress to PLOF and address remaining functional goals. (see flow sheet as

## 2024-05-30 ENCOUNTER — HOSPITAL ENCOUNTER (OUTPATIENT)
Facility: HOSPITAL | Age: 69
Setting detail: RECURRING SERIES
End: 2024-05-30
Payer: MEDICARE

## 2024-05-30 PROCEDURE — 97110 THERAPEUTIC EXERCISES: CPT

## 2024-05-30 NOTE — PROGRESS NOTES
PHYSICAL THERAPY - MEDICARE DAILY TREATMENT NOTE (updated 3/23)      Date: 2024          Patient Name:  Jesu Schrader :  1955   Medical   Diagnosis:  Radiculopathy, lumbosacral region [M54.17] Treatment Diagnosis:  Radiculopathy, lumbosacral region [M54.17]   Referral Source:  Ko Sotomayor MD Insurance:   Payor: MEDICARE / Plan: MEDICARE PART A AND B / Product Type: *No Product type* /                     Patient  verified yes     Visit #   Current  / Total 16 50   Time   In / Out 1025 am  1110  am    Total Treatment Time 45   Total Timed Codes 38   1:1 Treatment Time 38      [facility] Totals Reminder:  bill using total billable   min of TIMED therapeutic procedures and modalities.   8-22 min = 1 unit; 23-37 min = 2 units; 38-52 min = 3 units; 53-67 min = 4 units; 68-82 min = 5 units            SUBJECTIVE    Pain Level (0-10 scale): 4, low back into right LE.     Any medication changes, allergies to medications, adverse drug reactions, diagnosis change, or new procedure performed?: [x] No    [] Yes (see summary sheet for update)  Medications: Verified on Patient Summary List    Subjective functional status/changes:     MD gave me this new cream, working pretty good so far.     OBJECTIVE      Therapeutic Procedures:  Tx Min Billable or 1:1 Min (if diff from Tx Min) Procedure, Rationale, Specifics   38  03975 Therapeutic Exercise (timed):  increase ROM, strength, coordination, balance, and proprioception to improve patient's ability to progress to PLOF and address remaining functional goals. (see flow sheet as applicable)     Details if applicable:       41984 Neuromuscular Re-Education (timed):  improve balance, coordination, kinesthetic sense, posture, core stability and proprioception to improve patient's ability to develop conscious control of individual muscles and awareness of position of extremities in order to progress to PLOF and address remaining functional goals. (see flow sheet as

## 2024-06-04 ENCOUNTER — APPOINTMENT (OUTPATIENT)
Facility: HOSPITAL | Age: 69
End: 2024-06-04
Payer: MEDICARE

## 2024-06-07 ENCOUNTER — HOSPITAL ENCOUNTER (OUTPATIENT)
Facility: HOSPITAL | Age: 69
Setting detail: RECURRING SERIES
Discharge: HOME | End: 2024-06-10
Payer: MEDICARE

## 2024-06-07 PROCEDURE — 97110 THERAPEUTIC EXERCISES: CPT

## 2024-06-07 NOTE — PROGRESS NOTES
PHYSICAL THERAPY - MEDICARE DAILY TREATMENT NOTE (updated 3/23)      Date: 2024          Patient Name:  Jesu Schrader :  1955   Medical   Diagnosis:  Radiculopathy, lumbosacral region [M54.17] Treatment Diagnosis:  Radiculopathy, lumbosacral region [M54.17]   Referral Source:  Ko Sotomayor MD Insurance:   Payor: MEDICARE / Plan: MEDICARE PART A AND B / Product Type: *No Product type* /                     Patient  verified yes     Visit #   Current  / Total 17 50   Time   In / Out 1035 am  1120  am    Total Treatment Time 45   Total Timed Codes 39   1:1 Treatment Time 39      [facility] Totals Reminder:  bill using total billable   min of TIMED therapeutic procedures and modalities.   8-22 min = 1 unit; 23-37 min = 2 units; 38-52 min = 3 units; 53-67 min = 4 units; 68-82 min = 5 units            SUBJECTIVE    Pain Level (0-10 scale): 3, low back into right LE.     Any medication changes, allergies to medications, adverse drug reactions, diagnosis change, or new procedure performed?: [x] No    [] Yes (see summary sheet for update)  Medications: Verified on Patient Summary List    Subjective functional status/changes:     Going to see the spine doctor next week.     OBJECTIVE      Therapeutic Procedures:  Tx Min Billable or 1:1 Min (if diff from Tx Min) Procedure, Rationale, Specifics   39  17564 Therapeutic Exercise (timed):  increase ROM, strength, coordination, balance, and proprioception to improve patient's ability to progress to PLOF and address remaining functional goals. (see flow sheet as applicable)     Details if applicable:       08245 Neuromuscular Re-Education (timed):  improve balance, coordination, kinesthetic sense, posture, core stability and proprioception to improve patient's ability to develop conscious control of individual muscles and awareness of position of extremities in order to progress to PLOF and address remaining functional goals. (see flow sheet as applicable)

## 2024-06-18 ENCOUNTER — HOSPITAL ENCOUNTER (OUTPATIENT)
Facility: HOSPITAL | Age: 69
Setting detail: RECURRING SERIES
Discharge: HOME | End: 2024-06-21
Payer: MEDICARE

## 2024-06-18 PROCEDURE — 97110 THERAPEUTIC EXERCISES: CPT

## 2024-06-18 NOTE — PROGRESS NOTES
PHYSICAL THERAPY - MEDICARE DAILY TREATMENT NOTE (updated 3/23)      Date: 2024          Patient Name:  Jesu Schrader :  1955   Medical   Diagnosis:  Radiculopathy, lumbosacral region [M54.17] Treatment Diagnosis:  Radiculopathy, lumbosacral region [M54.17]   Referral Source:  Ko Sotomayor MD Insurance:   Payor: MEDICARE / Plan: MEDICARE PART A AND B / Product Type: *No Product type* /                     Patient  verified yes     Visit #   Current  / Total 18 50   Time   In / Out 1110 am  1155  am    Total Treatment Time 45   Total Timed Codes 39   1:1 Treatment Time 39      [facility] Totals Reminder:  bill using total billable   min of TIMED therapeutic procedures and modalities.   8-22 min = 1 unit; 23-37 min = 2 units; 38-52 min = 3 units; 53-67 min = 4 units; 68-82 min = 5 units            SUBJECTIVE    Pain Level (0-10 scale): 3, low back into right LE.     Any medication changes, allergies to medications, adverse drug reactions, diagnosis change, or new procedure performed?: [x] No    [] Yes (see summary sheet for update)  Medications: Verified on Patient Summary List    Subjective functional status/changes:     Going to see the spine doctor next week.   Getting surgery in my lumbar spine    OBJECTIVE      Therapeutic Procedures:  Tx Min Billable or 1:1 Min (if diff from Tx Min) Procedure, Rationale, Specifics   39  54702 Therapeutic Exercise (timed):  increase ROM, strength, coordination, balance, and proprioception to improve patient's ability to progress to PLOF and address remaining functional goals. (see flow sheet as applicable)     Details if applicable:       29137 Neuromuscular Re-Education (timed):  improve balance, coordination, kinesthetic sense, posture, core stability and proprioception to improve patient's ability to develop conscious control of individual muscles and awareness of position of extremities in order to progress to PLOF and address remaining functional

## 2024-06-20 ENCOUNTER — HOSPITAL ENCOUNTER (OUTPATIENT)
Facility: HOSPITAL | Age: 69
Setting detail: RECURRING SERIES
Discharge: HOME | End: 2024-06-23
Payer: MEDICARE

## 2024-06-20 PROCEDURE — 97110 THERAPEUTIC EXERCISES: CPT

## 2024-06-20 NOTE — PROGRESS NOTES
PHYSICAL THERAPY - MEDICARE DAILY TREATMENT NOTE (updated 3/23)      Date: 2024          Patient Name:  Jesu Schrader :  1955   Medical   Diagnosis:  Radiculopathy, lumbosacral region [M54.17] Treatment Diagnosis:  Radiculopathy, lumbosacral region [M54.17]   Referral Source:  Ko Sotomayor MD Insurance:   Payor: MEDICARE / Plan: MEDICARE PART A AND B / Product Type: *No Product type* /                     Patient  verified yes     Visit #   Current  / Total 19 50   Time   In / Out 1130 1220   Total Treatment Time 50   Total Timed Codes 41   1:1 Treatment Time 41      [facility] Totals Reminder:  bill using total billable   min of TIMED therapeutic procedures and modalities.   8-22 min = 1 unit; 23-37 min = 2 units; 38-52 min = 3 units; 53-67 min = 4 units; 68-82 min = 5 units            SUBJECTIVE    Pain Level (0-10 scale): 4, low back into right LE.     Any medication changes, allergies to medications, adverse drug reactions, diagnosis change, or new procedure performed?: [x] No    [] Yes (see summary sheet for update)  Medications: Verified on Patient Summary List    Subjective functional status/changes:     Going to see the spine doctor next week.   Getting surgery in my lumbar spine    OBJECTIVE      Therapeutic Procedures:  Tx Min Billable or 1:1 Min (if diff from Tx Min) Procedure, Rationale, Specifics   41  10147 Therapeutic Exercise (timed):  increase ROM, strength, coordination, balance, and proprioception to improve patient's ability to progress to PLOF and address remaining functional goals. (see flow sheet as applicable)     Details if applicable:       51600 Neuromuscular Re-Education (timed):  improve balance, coordination, kinesthetic sense, posture, core stability and proprioception to improve patient's ability to develop conscious control of individual muscles and awareness of position of extremities in order to progress to PLOF and address remaining functional goals. (see

## 2024-06-25 ENCOUNTER — HOSPITAL ENCOUNTER (OUTPATIENT)
Facility: HOSPITAL | Age: 69
Setting detail: RECURRING SERIES
Discharge: HOME | End: 2024-06-28
Payer: MEDICARE

## 2024-06-25 PROCEDURE — 97110 THERAPEUTIC EXERCISES: CPT

## 2024-06-25 NOTE — PROGRESS NOTES
PLOF and address remaining functional goals. (see flow sheet as applicable)     Details if applicable:       95392 Therapeutic Activity (timed):  use of dynamic activities replicating functional movements to increase ROM, strength, coordination, balance, and proprioception in order to improve patient's ability to progress to PLOF and address remaining functional goals.  (see flow sheet as applicable)    Details if applicable:           Details if applicable:            Details if applicable:       40     Total Total         [x]  Patient Education billed concurrently with other procedures   [x] Review HEP    [] Progressed/Changed HEP, detail:    [] Other detail:     Access Code: [email or web address]  URL: [email or web address]  Date: 04/05/2024  Prepared by: Jesu Quezada Jr    Exercises  - Clamshell  - 1 x daily - 3 sets - 10 reps  - Sidelying Reverse Clamshell  - 1 x daily - 3 sets - 10 reps  - Supine Single Knee to Chest Stretch  - 1 x daily - 3 sets - 10 reps  - Standing Hip Abduction with Counter Support  - 1 x daily - 3 sets - 10 reps      Other Objective/Functional Measures   Using quad cane  AMPAC: 23.57% no change    Pain Level at end of session (0-10 scale): 3      Assessment   Mr. Schrader has been to PT for 20 visits and reports mild improved standing tolerance but continues to note weakness with stairs and prolonged walking and standing before pain begins increasing. Pt continues to make modest progress towards goals. No changes within symptoms, but has started using new topical cream which is he states is working so far. Agree with D/C after today.   Patient will continue to benefit from skilled PT / OT services to modify and progress therapeutic interventions, analyze and address functional mobility deficits, analyze and address ROM deficits, and analyze and address strength deficits to address functional deficits and attain remaining goals.    Progress toward goals / Updated goals:  []  See Progress

## 2024-08-22 ENCOUNTER — HOSPITAL ENCOUNTER (EMERGENCY)
Facility: HOSPITAL | Age: 69
Discharge: HOME | End: 2024-08-22
Attending: EMERGENCY MEDICINE
Payer: MEDICARE

## 2024-08-22 ENCOUNTER — APPOINTMENT (OUTPATIENT)
Facility: HOSPITAL | Age: 69
End: 2024-08-22
Payer: MEDICARE

## 2024-08-22 VITALS
WEIGHT: 217 LBS | TEMPERATURE: 98.7 F | BODY MASS INDEX: 36.15 KG/M2 | RESPIRATION RATE: 19 BRPM | HEIGHT: 65 IN | HEART RATE: 76 BPM | DIASTOLIC BLOOD PRESSURE: 79 MMHG | OXYGEN SATURATION: 98 % | SYSTOLIC BLOOD PRESSURE: 173 MMHG

## 2024-08-22 DIAGNOSIS — R55 SYNCOPE AND COLLAPSE: Primary | ICD-10-CM

## 2024-08-22 DIAGNOSIS — D64.9 ANEMIA, UNSPECIFIED TYPE: ICD-10-CM

## 2024-08-22 LAB
ALBUMIN SERPL-MCNC: 2.8 G/DL (ref 3.5–5)
ALBUMIN/GLOB SERPL: 0.5 (ref 1.1–2.2)
ALP SERPL-CCNC: 67 U/L (ref 45–117)
ALT SERPL-CCNC: 29 U/L (ref 12–78)
ANION GAP SERPL CALC-SCNC: 5 MMOL/L (ref 5–15)
AST SERPL W P-5'-P-CCNC: 20 U/L (ref 15–37)
BASOPHILS # BLD: 0 K/UL (ref 0–0.1)
BASOPHILS NFR BLD: 0 % (ref 0–1)
BILIRUB SERPL-MCNC: 0.6 MG/DL (ref 0.2–1)
BUN SERPL-MCNC: 13 MG/DL (ref 6–20)
BUN/CREAT SERPL: 9 (ref 12–20)
CA-I BLD-MCNC: 8.9 MG/DL (ref 8.5–10.1)
CHLORIDE SERPL-SCNC: 106 MMOL/L (ref 97–108)
CO2 SERPL-SCNC: 26 MMOL/L (ref 21–32)
CREAT SERPL-MCNC: 1.44 MG/DL (ref 0.7–1.3)
DIFFERENTIAL METHOD BLD: ABNORMAL
EOSINOPHIL # BLD: 0.1 K/UL (ref 0–0.4)
EOSINOPHIL NFR BLD: 1 % (ref 0–7)
ERYTHROCYTE [DISTWIDTH] IN BLOOD BY AUTOMATED COUNT: 14.3 % (ref 11.5–14.5)
GLOBULIN SER CALC-MCNC: 5.4 G/DL (ref 2–4)
GLUCOSE SERPL-MCNC: 179 MG/DL (ref 65–100)
HCT VFR BLD AUTO: 27.3 % (ref 36.6–50.3)
HGB BLD-MCNC: 8.9 G/DL (ref 12.1–17)
IMM GRANULOCYTES # BLD AUTO: 0.1 K/UL (ref 0–0.04)
IMM GRANULOCYTES NFR BLD AUTO: 1 % (ref 0–0.5)
LYMPHOCYTES # BLD: 1.8 K/UL (ref 0.8–3.5)
LYMPHOCYTES NFR BLD: 13 % (ref 12–49)
MAGNESIUM SERPL-MCNC: 2 MG/DL (ref 1.6–2.4)
MCH RBC QN AUTO: 27.5 PG (ref 26–34)
MCHC RBC AUTO-ENTMCNC: 32.6 G/DL (ref 30–36.5)
MCV RBC AUTO: 84.3 FL (ref 80–99)
MONOCYTES # BLD: 1 K/UL (ref 0–1)
MONOCYTES NFR BLD: 8 % (ref 5–13)
NEUTS SEG # BLD: 10.3 K/UL (ref 1.8–8)
NEUTS SEG NFR BLD: 77 % (ref 32–75)
NRBC # BLD: 0 K/UL (ref 0–0.01)
NRBC BLD-RTO: 0 PER 100 WBC
PLATELET # BLD AUTO: 356 K/UL (ref 150–400)
PMV BLD AUTO: 10.2 FL (ref 8.9–12.9)
POTASSIUM SERPL-SCNC: 3.6 MMOL/L (ref 3.5–5.1)
PROT SERPL-MCNC: 8.2 G/DL (ref 6.4–8.2)
RBC # BLD AUTO: 3.24 M/UL (ref 4.1–5.7)
SODIUM SERPL-SCNC: 137 MMOL/L (ref 136–145)
TROPONIN I SERPL HS-MCNC: 7 NG/L (ref 0–76)
TROPONIN I SERPL HS-MCNC: 8 NG/L (ref 0–76)
WBC # BLD AUTO: 13.3 K/UL (ref 4.1–11.1)

## 2024-08-22 PROCEDURE — 96374 THER/PROPH/DIAG INJ IV PUSH: CPT

## 2024-08-22 PROCEDURE — 2580000003 HC RX 258: Performed by: EMERGENCY MEDICINE

## 2024-08-22 PROCEDURE — 36415 COLL VENOUS BLD VENIPUNCTURE: CPT

## 2024-08-22 PROCEDURE — 83735 ASSAY OF MAGNESIUM: CPT

## 2024-08-22 PROCEDURE — 71045 X-RAY EXAM CHEST 1 VIEW: CPT

## 2024-08-22 PROCEDURE — 93005 ELECTROCARDIOGRAM TRACING: CPT | Performed by: EMERGENCY MEDICINE

## 2024-08-22 PROCEDURE — 84484 ASSAY OF TROPONIN QUANT: CPT

## 2024-08-22 PROCEDURE — 85025 COMPLETE CBC W/AUTO DIFF WBC: CPT

## 2024-08-22 PROCEDURE — 6830039000 HC L3 TRAUMA ALERT

## 2024-08-22 PROCEDURE — 80053 COMPREHEN METABOLIC PANEL: CPT

## 2024-08-22 PROCEDURE — 99285 EMERGENCY DEPT VISIT HI MDM: CPT

## 2024-08-22 PROCEDURE — 99284 EMERGENCY DEPT VISIT MOD MDM: CPT

## 2024-08-22 PROCEDURE — 6360000002 HC RX W HCPCS: Performed by: EMERGENCY MEDICINE

## 2024-08-22 PROCEDURE — 70450 CT HEAD/BRAIN W/O DYE: CPT

## 2024-08-22 RX ORDER — 0.9 % SODIUM CHLORIDE 0.9 %
1000 INTRAVENOUS SOLUTION INTRAVENOUS ONCE
Status: COMPLETED | OUTPATIENT
Start: 2024-08-22 | End: 2024-08-22

## 2024-08-22 RX ORDER — FERROUS SULFATE 325(65) MG
325 TABLET ORAL
Qty: 30 TABLET | Refills: 0 | Status: SHIPPED | OUTPATIENT
Start: 2024-08-22 | End: 2024-09-21

## 2024-08-22 RX ORDER — LORAZEPAM 2 MG/ML
1 INJECTION INTRAMUSCULAR
Status: COMPLETED | OUTPATIENT
Start: 2024-08-22 | End: 2024-08-22

## 2024-08-22 RX ADMIN — SODIUM CHLORIDE 1000 ML: 9 INJECTION, SOLUTION INTRAVENOUS at 15:34

## 2024-08-22 RX ADMIN — LORAZEPAM 1 MG: 2 INJECTION INTRAMUSCULAR; INTRAVENOUS at 15:44

## 2024-08-22 ASSESSMENT — PAIN DESCRIPTION - ORIENTATION: ORIENTATION: LOWER

## 2024-08-22 ASSESSMENT — PAIN SCALES - GENERAL: PAINLEVEL_OUTOF10: 5

## 2024-08-22 ASSESSMENT — PAIN - FUNCTIONAL ASSESSMENT
PAIN_FUNCTIONAL_ASSESSMENT: NONE - DENIES PAIN
PAIN_FUNCTIONAL_ASSESSMENT: 0-10

## 2024-08-22 ASSESSMENT — PAIN DESCRIPTION - LOCATION: LOCATION: BACK

## 2024-08-22 ASSESSMENT — PAIN DESCRIPTION - PAIN TYPE: TYPE: SURGICAL PAIN

## 2024-08-22 NOTE — ED NOTES
Pt resting in position of comfort with family at bedside.  Pt has no complaints at this time.    Awaiting results from CT

## 2024-08-22 NOTE — ED PROVIDER NOTES
[facility] EMERGENCY DEPT  EMERGENCY DEPARTMENT HISTORY AND PHYSICAL EXAM      Date: 8/22/2024  Patient Name: Jesu Schrader      History of Presenting Illness       Chief Complaint   Patient presents with    Fall    Loss of Consciousness    Head Injury       History was provided by: Patient and Spouse    Location/Duration/Severity/Modifying factors     Jesu Schrader is a 69 y.o. male who arrived to the emergency department by by EMS where they received No additional treatment with complaints of Fall, Loss of Consciousness, and Head Injury        Patient is a 69-year-old male with a past medical history of type 2 diabetes, hypertension, hyperlipidemia, hypercalcemia, CAD, with stents presenting with complaints of a syncopal event.  He had surgery on his back about 10 days ago, patient says this morning he got up and felt a little lightheaded.  He says that he then felt a little bit better and then later, he felt dizzy again he unfortunately had a syncopal event.  He says he went down and hit his head on the floor.  Denies any back pain, since the fall.  He does not know how long he was out for.  Denies any chest pain or shortness of breath patient was initially reluctant to come to the hospital.  He says he is otherwise been in his usual state of health.  Has had a little bit of bilateral ankle edema since the surgery.    Due to the patient's head trauma and history of antiplatelet use a bravo trauma was activated and trauma assessment completed by me.          There are no other complaints, changes, or physical findings at this time.    PCP: Mackenzie Castorena FNP    No current facility-administered medications for this encounter.     Current Outpatient Medications   Medication Sig Dispense Refill    ferrous sulfate (IRON 325) 325 (65 Fe) MG tablet Take 1 tablet by mouth daily (with breakfast) 30 tablet 0    loratadine (CLARITIN) 10 MG tablet Take 1 tablet by mouth daily 30 tablet 0    albuterol sulfate HFA  patients presenting problems have been discussed, and the staff are in agreement with the care plan formulated and outlined with them.  I have encouraged them to ask questions as they arise throughout their visit.    Vital Signs-Reviewed the patient's vital signs.    Vitals:    08/22/24 2028 08/22/24 2043 08/22/24 2058 08/22/24 2115   BP: (!) 155/59 (!) 154/107 (!) 163/83 (!) 173/79   Pulse: 81 87 74 76   Resp: 23 18 22 19   Temp:       TempSrc:       SpO2: 98% 100% 99% 98%   Weight:       Height:               Records Reviewed:   Nursing Notes, available previous labs, imaging and medical records have been reviewed (if available).    Additional Considerations:    N/A    Provider Notes (Medical Decision Making):     Jesu Schrader is a 69 y.o. male with noted past medical history,who presented to the Emergency department with a chief complaint of Fall, Loss of Consciousness, and Head Injury              MDM  Number of Diagnoses or Management Options  Anemia, unspecified type  Syncope and collapse  Diagnosis management comments: This is a 69-year-old male comes to the ED with a chief complaint of a fall which appears to be consistent with syncope.  He just had back surgery about 10 days ago.  No associated chest pain or shortness of breath preceding or now.  No new back pain or back injury.  He says during the course of the event he felt dizzy and then says he had generalized weakness and then went down.  He is on Plavix.  Patient was telling RN staff that he is not willing to do CT scan because his \"head is fine\".  He did strike his head during the fall and he is on Plavix so I discussed with him the need for CT scan of his head at least given the blood thinner and the risk potentially of ICH such as subdural hematoma or similar and the potential risks that this could cause death or permanent and serious disability if it went untreated.  [facility].  Patient ultimately was agreeable to  consultations requested.    Procedures and Critical Care       Performed by: Lealnd Gomes DO    Procedures             CRITICAL CARE NOTE :  11:21 AM  CRITICAL CARE TIME: CRITICAL CARE NOTE:    I have spent 30 minutes of critical care time involved in lab review, consultations with specialist, family decision-making, and documentation.  During this entire length of time I was immediately available to the patient.           Critical Care:  The reason for providing this level of medical care for this critically ill patient was due a critical illness that impaired one or more vital organ systems such that there was a high probability of imminent or life threatening deterioration in the patients condition. This care involved high complexity decision making to assess, manipulate, and support vital system functions, to treat this vital organ system failure and to prevent further life threatening deterioration of the patient’s condition. Aggregate Critical Care Time is exclusive of procedural and teaching time.    DO Leland Hurtado DO      Disposition       Disposition: Discharged Home    DISCHARGE: At this time, patient is stable and appropriate for discharge home.  Patient demonstrates understanding of current diagnoses and is in agreement with the treatment plan.  They are advised that while the likelihood of serious underlying condition is low at this point given the evaluation performed today, we cannot fully rule it out.  They are advised to immediately return with any new symptoms or worsening of current condition. Any Incidental findings were noted on the patient's discharge paperwork as well as verbally directly to the patient, and the appropriate follow-up was given to the patient as far as instructions on testing needed as well as the timeframe.  All questions have been answered.  Patient is given educational material regarding their diagnoses, including danger symptoms and when to return to the

## 2024-08-22 NOTE — ED TRIAGE NOTES
Back surgery on 8/12, recently discharged, woke up today and felt dizzy. This afternoon at 1430 was walking across floor when noticed vision was becoming black, pt had syncopal episode. Reports fell and struck head, pupils equal and reactive. No neuro deficits at this time.    Pt states his head is fine and does not want a head CT.

## 2024-08-23 NOTE — DISCHARGE INSTRUCTIONS
1.  Please follow-up with your surgeon as well as your primary care doctor for repeat of your hemoglobin.    2.  Your hemoglobin was slightly low at 8.9, this is likely postoperative from the surgery.    3.  Please return to the emergency room at any time should your symptoms worsen.  Start taking the iron I prescribed you.    It is important to follow-up with your primary care doctor for additional testing in the very near future.        Thank you for choosing our Emergency Department for your care.  It is our privilege to care for you in your time of need.  In the next several days, you may receive a survey via email or mailed to your home about your experience with our team.  We would greatly appreciate you taking a few minutes to complete the survey, as we use this information to learn what we have done well and what we could be doing better. Thank you for trusting us with your care!    Below you will find a list of your tests from today's visit.   Labs  Recent Results (from the past 12 hour(s))   CBC with Auto Differential    Collection Time: 08/22/24  3:27 PM   Result Value Ref Range    WBC 13.3 (H) 4.1 - 11.1 K/uL    RBC 3.24 (L) 4.10 - 5.70 M/uL    Hemoglobin 8.9 (L) 12.1 - 17.0 g/dL    Hematocrit 27.3 (L) 36.6 - 50.3 %    MCV 84.3 80.0 - 99.0 FL    MCH 27.5 26.0 - 34.0 PG    MCHC 32.6 30.0 - 36.5 g/dL    RDW 14.3 11.5 - 14.5 %    Platelets 356 150 - 400 K/uL    MPV 10.2 8.9 - 12.9 FL    Nucleated RBCs 0.0 0.0  WBC    nRBC 0.00 0.00 - 0.01 K/uL    Neutrophils % 77 (H) 32 - 75 %    Lymphocytes % 13 12 - 49 %    Monocytes % 8 5 - 13 %    Eosinophils % 1 0 - 7 %    Basophils % 0 0 - 1 %    Immature Granulocytes % 1 (H) 0 - 0.5 %    Neutrophils Absolute 10.3 (H) 1.8 - 8.0 K/UL    Lymphocytes Absolute 1.8 0.8 - 3.5 K/UL    Monocytes Absolute 1.0 0.0 - 1.0 K/UL    Eosinophils Absolute 0.1 0.0 - 0.4 K/UL    Basophils Absolute 0.0 0.0 - 0.1 K/UL    Immature Granulocytes Absolute 0.1 (H) 0.00 - 0.04 K/UL

## 2024-08-24 LAB
EKG ATRIAL RATE: 77 BPM
EKG DIAGNOSIS: NORMAL
EKG P AXIS: 6 DEGREES
EKG P-R INTERVAL: 162 MS
EKG Q-T INTERVAL: 394 MS
EKG QRS DURATION: 84 MS
EKG QTC CALCULATION (BAZETT): 445 MS
EKG R AXIS: 68 DEGREES
EKG T AXIS: 46 DEGREES
EKG VENTRICULAR RATE: 77 BPM

## 2024-09-23 ENCOUNTER — APPOINTMENT (OUTPATIENT)
Facility: HOSPITAL | Age: 69
End: 2024-09-23
Attending: EMERGENCY MEDICINE
Payer: MEDICARE

## 2024-09-23 ENCOUNTER — HOSPITAL ENCOUNTER (OUTPATIENT)
Facility: HOSPITAL | Age: 69
Setting detail: OBSERVATION
Discharge: HOME | End: 2024-09-25
Attending: EMERGENCY MEDICINE | Admitting: INTERNAL MEDICINE
Payer: MEDICARE

## 2024-09-23 ENCOUNTER — APPOINTMENT (OUTPATIENT)
Facility: HOSPITAL | Age: 69
End: 2024-09-23
Payer: MEDICARE

## 2024-09-23 DIAGNOSIS — I47.19 ATRIAL TACHYCARDIA (HCC): ICD-10-CM

## 2024-09-23 DIAGNOSIS — R06.09 DYSPNEA ON EXERTION: Primary | ICD-10-CM

## 2024-09-23 PROBLEM — R06.00 DYSPNEA: Status: ACTIVE | Noted: 2024-09-23

## 2024-09-23 PROBLEM — D64.9 ANEMIA: Status: ACTIVE | Noted: 2024-09-23

## 2024-09-23 PROBLEM — R63.4 WEIGHT LOSS, UNINTENTIONAL: Status: ACTIVE | Noted: 2024-09-23

## 2024-09-23 LAB
ALBUMIN SERPL-MCNC: 3.6 G/DL (ref 3.5–5)
ALBUMIN/GLOB SERPL: 0.7 (ref 1.1–2.2)
ALP SERPL-CCNC: 175 U/L (ref 45–117)
ALT SERPL-CCNC: 12 U/L (ref 12–78)
ANION GAP SERPL CALC-SCNC: 10 MMOL/L (ref 2–12)
AST SERPL W P-5'-P-CCNC: 10 U/L (ref 15–37)
BASOPHILS # BLD: 0 K/UL (ref 0–0.1)
BASOPHILS NFR BLD: 0 % (ref 0–1)
BILIRUB SERPL-MCNC: 0.6 MG/DL (ref 0.2–1)
BNP SERPL-MCNC: 22 PG/ML
BUN SERPL-MCNC: 16 MG/DL (ref 6–20)
BUN/CREAT SERPL: 9 (ref 12–20)
CA-I BLD-MCNC: 9.2 MG/DL (ref 8.5–10.1)
CHLORIDE SERPL-SCNC: 104 MMOL/L (ref 97–108)
CO2 SERPL-SCNC: 25 MMOL/L (ref 21–32)
CREAT SERPL-MCNC: 1.86 MG/DL (ref 0.7–1.3)
DIFFERENTIAL METHOD BLD: ABNORMAL
ECHO BSA: 1.99 M2
EOSINOPHIL # BLD: 0.1 K/UL (ref 0–0.4)
EOSINOPHIL NFR BLD: 0 % (ref 0–7)
ERYTHROCYTE [DISTWIDTH] IN BLOOD BY AUTOMATED COUNT: 14.5 % (ref 11.5–14.5)
FLUAV RNA SPEC QL NAA+PROBE: NOT DETECTED
FLUBV RNA SPEC QL NAA+PROBE: NOT DETECTED
GLOBULIN SER CALC-MCNC: 5.4 G/DL (ref 2–4)
GLUCOSE SERPL-MCNC: 154 MG/DL (ref 65–100)
HCT VFR BLD AUTO: 34.7 % (ref 36.6–50.3)
HGB BLD-MCNC: 11.2 G/DL (ref 12.1–17)
IMM GRANULOCYTES # BLD AUTO: 0.1 K/UL (ref 0–0.04)
IMM GRANULOCYTES NFR BLD AUTO: 0 % (ref 0–0.5)
LIPASE SERPL-CCNC: 46 U/L (ref 13–75)
LYMPHOCYTES # BLD: 2.1 K/UL (ref 0.8–3.5)
LYMPHOCYTES NFR BLD: 13 % (ref 12–49)
MAGNESIUM SERPL-MCNC: 1.7 MG/DL (ref 1.6–2.4)
MCH RBC QN AUTO: 26.9 PG (ref 26–34)
MCHC RBC AUTO-ENTMCNC: 32.3 G/DL (ref 30–36.5)
MCV RBC AUTO: 83.4 FL (ref 80–99)
MONOCYTES # BLD: 1.3 K/UL (ref 0–1)
MONOCYTES NFR BLD: 8 % (ref 5–13)
NEUTS SEG # BLD: 12.8 K/UL (ref 1.8–8)
NEUTS SEG NFR BLD: 79 % (ref 32–75)
NRBC # BLD: 0 K/UL (ref 0–0.01)
NRBC BLD-RTO: 0 PER 100 WBC
PLATELET # BLD AUTO: 296 K/UL (ref 150–400)
PMV BLD AUTO: 10.7 FL (ref 8.9–12.9)
POTASSIUM SERPL-SCNC: 3.3 MMOL/L (ref 3.5–5.1)
PROCALCITONIN SERPL-MCNC: 0.1 NG/ML
PROT SERPL-MCNC: 9 G/DL (ref 6.4–8.2)
RBC # BLD AUTO: 4.16 M/UL (ref 4.1–5.7)
RETICS # AUTO: 0.05 M/UL (ref 0.03–0.1)
RETICS/RBC NFR AUTO: 1.2 % (ref 0.7–2.1)
SARS-COV-2 RNA RESP QL NAA+PROBE: NOT DETECTED
SODIUM SERPL-SCNC: 139 MMOL/L (ref 136–145)
TROPONIN I SERPL HS-MCNC: 6 NG/L (ref 0–76)
WBC # BLD AUTO: 16.4 K/UL (ref 4.1–11.1)

## 2024-09-23 PROCEDURE — 93005 ELECTROCARDIOGRAM TRACING: CPT | Performed by: EMERGENCY MEDICINE

## 2024-09-23 PROCEDURE — 85025 COMPLETE CBC W/AUTO DIFF WBC: CPT

## 2024-09-23 PROCEDURE — 83735 ASSAY OF MAGNESIUM: CPT

## 2024-09-23 PROCEDURE — 2580000003 HC RX 258: Performed by: EMERGENCY MEDICINE

## 2024-09-23 PROCEDURE — 71046 X-RAY EXAM CHEST 2 VIEWS: CPT

## 2024-09-23 PROCEDURE — 6360000002 HC RX W HCPCS: Performed by: EMERGENCY MEDICINE

## 2024-09-23 PROCEDURE — 84484 ASSAY OF TROPONIN QUANT: CPT

## 2024-09-23 PROCEDURE — 83690 ASSAY OF LIPASE: CPT

## 2024-09-23 PROCEDURE — G0378 HOSPITAL OBSERVATION PER HR: HCPCS

## 2024-09-23 PROCEDURE — 6360000004 HC RX CONTRAST MEDICATION: Performed by: EMERGENCY MEDICINE

## 2024-09-23 PROCEDURE — 99285 EMERGENCY DEPT VISIT HI MDM: CPT

## 2024-09-23 PROCEDURE — 85045 AUTOMATED RETICULOCYTE COUNT: CPT

## 2024-09-23 PROCEDURE — 36415 COLL VENOUS BLD VENIPUNCTURE: CPT

## 2024-09-23 PROCEDURE — 71275 CT ANGIOGRAPHY CHEST: CPT

## 2024-09-23 PROCEDURE — 74018 RADEX ABDOMEN 1 VIEW: CPT

## 2024-09-23 PROCEDURE — 83880 ASSAY OF NATRIURETIC PEPTIDE: CPT

## 2024-09-23 PROCEDURE — 96374 THER/PROPH/DIAG INJ IV PUSH: CPT

## 2024-09-23 PROCEDURE — 84145 PROCALCITONIN (PCT): CPT

## 2024-09-23 PROCEDURE — 80053 COMPREHEN METABOLIC PANEL: CPT

## 2024-09-23 PROCEDURE — 87636 SARSCOV2 & INF A&B AMP PRB: CPT

## 2024-09-23 PROCEDURE — 93971 EXTREMITY STUDY: CPT

## 2024-09-23 RX ORDER — SODIUM CHLORIDE 0.9 % (FLUSH) 0.9 %
5-40 SYRINGE (ML) INJECTION EVERY 12 HOURS SCHEDULED
Status: DISCONTINUED | OUTPATIENT
Start: 2024-09-23 | End: 2024-09-25 | Stop reason: HOSPADM

## 2024-09-23 RX ORDER — ONDANSETRON 2 MG/ML
4 INJECTION INTRAMUSCULAR; INTRAVENOUS ONCE
Status: COMPLETED | OUTPATIENT
Start: 2024-09-23 | End: 2024-09-23

## 2024-09-23 RX ORDER — ONDANSETRON 4 MG/1
4 TABLET, ORALLY DISINTEGRATING ORAL EVERY 8 HOURS PRN
Status: DISCONTINUED | OUTPATIENT
Start: 2024-09-23 | End: 2024-09-25 | Stop reason: HOSPADM

## 2024-09-23 RX ORDER — POTASSIUM CHLORIDE 1500 MG/1
40 TABLET, EXTENDED RELEASE ORAL PRN
Status: DISCONTINUED | OUTPATIENT
Start: 2024-09-23 | End: 2024-09-25 | Stop reason: HOSPADM

## 2024-09-23 RX ORDER — MAGNESIUM SULFATE IN WATER 40 MG/ML
2000 INJECTION, SOLUTION INTRAVENOUS PRN
Status: DISCONTINUED | OUTPATIENT
Start: 2024-09-23 | End: 2024-09-25 | Stop reason: HOSPADM

## 2024-09-23 RX ORDER — LORAZEPAM 2 MG/ML
1 INJECTION INTRAMUSCULAR
Status: ACTIVE | OUTPATIENT
Start: 2024-09-23 | End: 2024-09-24

## 2024-09-23 RX ORDER — SODIUM CHLORIDE 0.9 % (FLUSH) 0.9 %
5-40 SYRINGE (ML) INJECTION PRN
Status: DISCONTINUED | OUTPATIENT
Start: 2024-09-23 | End: 2024-09-25 | Stop reason: HOSPADM

## 2024-09-23 RX ORDER — SODIUM CHLORIDE 9 MG/ML
INJECTION, SOLUTION INTRAVENOUS PRN
Status: DISCONTINUED | OUTPATIENT
Start: 2024-09-23 | End: 2024-09-25 | Stop reason: HOSPADM

## 2024-09-23 RX ORDER — 0.9 % SODIUM CHLORIDE 0.9 %
500 INTRAVENOUS SOLUTION INTRAVENOUS ONCE
Status: COMPLETED | OUTPATIENT
Start: 2024-09-23 | End: 2024-09-23

## 2024-09-23 RX ORDER — POTASSIUM CHLORIDE 7.45 MG/ML
10 INJECTION INTRAVENOUS PRN
Status: DISCONTINUED | OUTPATIENT
Start: 2024-09-23 | End: 2024-09-25 | Stop reason: HOSPADM

## 2024-09-23 RX ORDER — ACETAMINOPHEN 325 MG/1
650 TABLET ORAL EVERY 6 HOURS PRN
Status: DISCONTINUED | OUTPATIENT
Start: 2024-09-23 | End: 2024-09-25 | Stop reason: HOSPADM

## 2024-09-23 RX ORDER — ACETAMINOPHEN 650 MG/1
650 SUPPOSITORY RECTAL EVERY 6 HOURS PRN
Status: DISCONTINUED | OUTPATIENT
Start: 2024-09-23 | End: 2024-09-25 | Stop reason: HOSPADM

## 2024-09-23 RX ORDER — IOPAMIDOL 755 MG/ML
80 INJECTION, SOLUTION INTRAVASCULAR
Status: COMPLETED | OUTPATIENT
Start: 2024-09-23 | End: 2024-09-23

## 2024-09-23 RX ORDER — ONDANSETRON 2 MG/ML
4 INJECTION INTRAMUSCULAR; INTRAVENOUS EVERY 6 HOURS PRN
Status: DISCONTINUED | OUTPATIENT
Start: 2024-09-23 | End: 2024-09-25 | Stop reason: HOSPADM

## 2024-09-23 RX ORDER — POLYETHYLENE GLYCOL 3350 17 G/17G
17 POWDER, FOR SOLUTION ORAL DAILY PRN
Status: DISCONTINUED | OUTPATIENT
Start: 2024-09-23 | End: 2024-09-25 | Stop reason: HOSPADM

## 2024-09-23 RX ADMIN — SODIUM CHLORIDE 500 ML: 9 INJECTION, SOLUTION INTRAVENOUS at 18:25

## 2024-09-23 RX ADMIN — ONDANSETRON 4 MG: 2 INJECTION INTRAMUSCULAR; INTRAVENOUS at 18:57

## 2024-09-23 RX ADMIN — IOPAMIDOL 80 ML: 755 INJECTION, SOLUTION INTRAVENOUS at 20:01

## 2024-09-23 ASSESSMENT — PAIN SCALES - GENERAL
PAINLEVEL_OUTOF10: 0
PAINLEVEL_OUTOF10: 3

## 2024-09-23 ASSESSMENT — PAIN - FUNCTIONAL ASSESSMENT: PAIN_FUNCTIONAL_ASSESSMENT: NONE - DENIES PAIN

## 2024-09-24 ENCOUNTER — APPOINTMENT (OUTPATIENT)
Facility: HOSPITAL | Age: 69
End: 2024-09-24
Attending: INTERNAL MEDICINE
Payer: MEDICARE

## 2024-09-24 LAB
ANION GAP SERPL CALC-SCNC: 7 MMOL/L (ref 2–12)
BASOPHILS # BLD: 0 K/UL (ref 0–0.1)
BASOPHILS NFR BLD: 0 % (ref 0–1)
BUN SERPL-MCNC: 15 MG/DL (ref 6–20)
BUN/CREAT SERPL: 9 (ref 12–20)
CA-I BLD-MCNC: 9.1 MG/DL (ref 8.5–10.1)
CHLORIDE SERPL-SCNC: 106 MMOL/L (ref 97–108)
CO2 SERPL-SCNC: 27 MMOL/L (ref 21–32)
CREAT SERPL-MCNC: 1.67 MG/DL (ref 0.7–1.3)
DIFFERENTIAL METHOD BLD: ABNORMAL
EOSINOPHIL # BLD: 0.2 K/UL (ref 0–0.4)
EOSINOPHIL NFR BLD: 2 % (ref 0–7)
ERYTHROCYTE [DISTWIDTH] IN BLOOD BY AUTOMATED COUNT: 14.2 % (ref 11.5–14.5)
FOLATE SERPL-MCNC: 11.9 NG/ML (ref 5–21)
GLUCOSE SERPL-MCNC: 153 MG/DL (ref 65–100)
HCT VFR BLD AUTO: 33.5 % (ref 36.6–50.3)
HGB BLD-MCNC: 10.7 G/DL (ref 12.1–17)
IMM GRANULOCYTES # BLD AUTO: 0.1 K/UL (ref 0–0.04)
IMM GRANULOCYTES NFR BLD AUTO: 1 % (ref 0–0.5)
IRON SATN MFR SERPL: 12 % (ref 20–50)
IRON SERPL-MCNC: 23 UG/DL (ref 35–150)
LYMPHOCYTES # BLD: 2.8 K/UL (ref 0.8–3.5)
LYMPHOCYTES NFR BLD: 18 % (ref 12–49)
MCH RBC QN AUTO: 26.8 PG (ref 26–34)
MCHC RBC AUTO-ENTMCNC: 31.9 G/DL (ref 30–36.5)
MCV RBC AUTO: 83.8 FL (ref 80–99)
MONOCYTES # BLD: 1.3 K/UL (ref 0–1)
MONOCYTES NFR BLD: 8 % (ref 5–13)
NEUTS SEG # BLD: 10.9 K/UL (ref 1.8–8)
NEUTS SEG NFR BLD: 71 % (ref 32–75)
NRBC # BLD: 0 K/UL (ref 0–0.01)
NRBC BLD-RTO: 0 PER 100 WBC
PLATELET # BLD AUTO: 256 K/UL (ref 150–400)
POTASSIUM SERPL-SCNC: 3.4 MMOL/L (ref 3.5–5.1)
RBC # BLD AUTO: 4 M/UL (ref 4.1–5.7)
SODIUM SERPL-SCNC: 140 MMOL/L (ref 136–145)
TIBC SERPL-MCNC: 188 UG/DL (ref 250–450)
VIT B12 SERPL-MCNC: 210 PG/ML (ref 193–986)
WBC # BLD AUTO: 15.4 K/UL (ref 4.1–11.1)

## 2024-09-24 PROCEDURE — G0378 HOSPITAL OBSERVATION PER HR: HCPCS

## 2024-09-24 PROCEDURE — 83540 ASSAY OF IRON: CPT

## 2024-09-24 PROCEDURE — 84443 ASSAY THYROID STIM HORMONE: CPT

## 2024-09-24 PROCEDURE — 6370000000 HC RX 637 (ALT 250 FOR IP): Performed by: INTERNAL MEDICINE

## 2024-09-24 PROCEDURE — 85025 COMPLETE CBC W/AUTO DIFF WBC: CPT

## 2024-09-24 PROCEDURE — 93306 TTE W/DOPPLER COMPLETE: CPT

## 2024-09-24 PROCEDURE — 36415 COLL VENOUS BLD VENIPUNCTURE: CPT

## 2024-09-24 PROCEDURE — 2580000003 HC RX 258: Performed by: INTERNAL MEDICINE

## 2024-09-24 PROCEDURE — 80048 BASIC METABOLIC PNL TOTAL CA: CPT

## 2024-09-24 PROCEDURE — 82746 ASSAY OF FOLIC ACID SERUM: CPT

## 2024-09-24 PROCEDURE — 82607 VITAMIN B-12: CPT

## 2024-09-24 RX ORDER — ASPIRIN 81 MG/1
81 TABLET ORAL DAILY
Status: DISCONTINUED | OUTPATIENT
Start: 2024-09-24 | End: 2024-09-25 | Stop reason: HOSPADM

## 2024-09-24 RX ORDER — DOCUSATE SODIUM 100 MG/1
100 CAPSULE, LIQUID FILLED ORAL 2 TIMES DAILY PRN
Status: DISCONTINUED | OUTPATIENT
Start: 2024-09-24 | End: 2024-09-25 | Stop reason: HOSPADM

## 2024-09-24 RX ORDER — GABAPENTIN 300 MG/1
300 CAPSULE ORAL NIGHTLY
Status: DISCONTINUED | OUTPATIENT
Start: 2024-09-24 | End: 2024-09-25 | Stop reason: HOSPADM

## 2024-09-24 RX ORDER — LATANOPROST 50 UG/ML
2 SOLUTION/ DROPS OPHTHALMIC DAILY
Status: DISCONTINUED | OUTPATIENT
Start: 2024-09-24 | End: 2024-09-24

## 2024-09-24 RX ORDER — HYDRALAZINE HYDROCHLORIDE 50 MG/1
100 TABLET, FILM COATED ORAL 2 TIMES DAILY
Status: DISCONTINUED | OUTPATIENT
Start: 2024-09-24 | End: 2024-09-25 | Stop reason: HOSPADM

## 2024-09-24 RX ORDER — LATANOPROST 50 UG/ML
1 SOLUTION/ DROPS OPHTHALMIC DAILY
Status: DISCONTINUED | OUTPATIENT
Start: 2024-09-25 | End: 2024-09-25 | Stop reason: HOSPADM

## 2024-09-24 RX ORDER — DOCUSATE SODIUM 100 MG/1
100 CAPSULE, LIQUID FILLED ORAL 2 TIMES DAILY PRN
COMMUNITY

## 2024-09-24 RX ORDER — ATORVASTATIN CALCIUM 40 MG/1
80 TABLET, FILM COATED ORAL DAILY
Status: DISCONTINUED | OUTPATIENT
Start: 2024-09-24 | End: 2024-09-25 | Stop reason: HOSPADM

## 2024-09-24 RX ORDER — CETIRIZINE HYDROCHLORIDE 10 MG/1
10 TABLET ORAL DAILY
Status: DISCONTINUED | OUTPATIENT
Start: 2024-09-24 | End: 2024-09-25 | Stop reason: HOSPADM

## 2024-09-24 RX ORDER — AMLODIPINE BESYLATE 5 MG/1
10 TABLET ORAL DAILY
Status: DISCONTINUED | OUTPATIENT
Start: 2024-09-24 | End: 2024-09-25 | Stop reason: HOSPADM

## 2024-09-24 RX ADMIN — GABAPENTIN 300 MG: 300 CAPSULE ORAL at 21:26

## 2024-09-24 RX ADMIN — CETIRIZINE HYDROCHLORIDE 10 MG: 10 TABLET, FILM COATED ORAL at 11:50

## 2024-09-24 RX ADMIN — POTASSIUM BICARBONATE 40 MEQ: 782 TABLET, EFFERVESCENT ORAL at 18:46

## 2024-09-24 RX ADMIN — LATANOPROST 2 DROP: 50 SOLUTION OPHTHALMIC at 11:50

## 2024-09-24 RX ADMIN — HYDRALAZINE HYDROCHLORIDE 100 MG: 50 TABLET ORAL at 11:50

## 2024-09-24 RX ADMIN — SODIUM CHLORIDE, PRESERVATIVE FREE 10 ML: 5 INJECTION INTRAVENOUS at 10:25

## 2024-09-24 RX ADMIN — AMLODIPINE BESYLATE 10 MG: 5 TABLET ORAL at 11:50

## 2024-09-24 RX ADMIN — ASPIRIN 81 MG: 81 TABLET, COATED ORAL at 11:50

## 2024-09-24 RX ADMIN — SODIUM CHLORIDE, PRESERVATIVE FREE 10 ML: 5 INJECTION INTRAVENOUS at 21:26

## 2024-09-24 RX ADMIN — HYDRALAZINE HYDROCHLORIDE 100 MG: 50 TABLET ORAL at 21:26

## 2024-09-24 RX ADMIN — ATORVASTATIN CALCIUM 80 MG: 40 TABLET, FILM COATED ORAL at 11:50

## 2024-09-24 ASSESSMENT — PAIN SCALES - GENERAL: PAINLEVEL_OUTOF10: 0

## 2024-09-25 VITALS
DIASTOLIC BLOOD PRESSURE: 79 MMHG | SYSTOLIC BLOOD PRESSURE: 127 MMHG | RESPIRATION RATE: 18 BRPM | OXYGEN SATURATION: 96 % | HEIGHT: 65 IN | TEMPERATURE: 98.8 F | WEIGHT: 190 LBS | BODY MASS INDEX: 31.65 KG/M2 | HEART RATE: 97 BPM

## 2024-09-25 LAB
ANION GAP SERPL CALC-SCNC: 5 MMOL/L (ref 2–12)
BASOPHILS # BLD: 0 K/UL (ref 0–0.1)
BASOPHILS NFR BLD: 0 % (ref 0–1)
BUN SERPL-MCNC: 11 MG/DL (ref 6–20)
BUN/CREAT SERPL: 7 (ref 12–20)
CA-I BLD-MCNC: 9.1 MG/DL (ref 8.5–10.1)
CHLORIDE SERPL-SCNC: 107 MMOL/L (ref 97–108)
CO2 SERPL-SCNC: 27 MMOL/L (ref 21–32)
CREAT SERPL-MCNC: 1.56 MG/DL (ref 0.7–1.3)
DIFFERENTIAL METHOD BLD: ABNORMAL
ECHO AO ASC DIAM: 2.7 CM
ECHO AO ASCENDING AORTA INDEX: 1.39 CM/M2
ECHO AO ROOT DIAM: 3.5 CM
ECHO AO ROOT INDEX: 1.8 CM/M2
ECHO AV AREA PEAK VELOCITY: 3.4 CM2
ECHO AV AREA VTI: 3.7 CM2
ECHO AV AREA/BSA PEAK VELOCITY: 1.8 CM2/M2
ECHO AV AREA/BSA VTI: 1.9 CM2/M2
ECHO AV MEAN GRADIENT: 2 MMHG
ECHO AV MEAN VELOCITY: 0.8 M/S
ECHO AV PEAK GRADIENT: 4 MMHG
ECHO AV PEAK VELOCITY: 1 M/S
ECHO AV VELOCITY RATIO: 0.8
ECHO AV VTI: 17 CM
ECHO BSA: 1.99 M2
ECHO EST RA PRESSURE: 3 MMHG
ECHO LA AREA 4C: 9.3 CM2
ECHO LA DIAMETER INDEX: 1.34 CM/M2
ECHO LA DIAMETER: 2.6 CM
ECHO LA MAJOR AXIS: 3.9 CM
ECHO LA TO AORTIC ROOT RATIO: 0.74
ECHO LA VOL MOD A4C: 16 ML (ref 18–58)
ECHO LA VOLUME INDEX MOD A4C: 8 ML/M2 (ref 16–34)
ECHO LV E' LATERAL VELOCITY: 9.6 CM/S
ECHO LV E' SEPTAL VELOCITY: 7 CM/S
ECHO LV EDV 3D: 134 ML
ECHO LV EDV A4C: 86 ML
ECHO LV EDV INDEX 3D: 69 ML/M2
ECHO LV EDV INDEX A4C: 44 ML/M2
ECHO LV EJECTION FRACTION 3D: 48 %
ECHO LV EJECTION FRACTION A4C: 67 %
ECHO LV ESV 3D: 70 ML
ECHO LV ESV A4C: 29 ML
ECHO LV ESV INDEX 3D: 36 ML/M2
ECHO LV ESV INDEX A4C: 15 ML/M2
ECHO LV FRACTIONAL SHORTENING: 42 % (ref 28–44)
ECHO LV INTERNAL DIMENSION DIASTOLE INDEX: 1.96 CM/M2
ECHO LV INTERNAL DIMENSION DIASTOLIC: 3.8 CM (ref 4.2–5.9)
ECHO LV INTERNAL DIMENSION SYSTOLIC INDEX: 1.13 CM/M2
ECHO LV INTERNAL DIMENSION SYSTOLIC: 2.2 CM
ECHO LV IVSD: 1.1 CM (ref 0.6–1)
ECHO LV MASS 2D: 134.7 G (ref 88–224)
ECHO LV MASS 3D INDEX: 70.1 G/M2
ECHO LV MASS 3D: 136 G
ECHO LV MASS INDEX 2D: 69.4 G/M2 (ref 49–115)
ECHO LV POSTERIOR WALL DIASTOLIC: 1.1 CM (ref 0.6–1)
ECHO LV RELATIVE WALL THICKNESS RATIO: 0.58
ECHO LVOT AREA: 4.2 CM2
ECHO LVOT AV VTI INDEX: 0.89
ECHO LVOT DIAM: 2.3 CM
ECHO LVOT MEAN GRADIENT: 1 MMHG
ECHO LVOT PEAK GRADIENT: 3 MMHG
ECHO LVOT PEAK VELOCITY: 0.8 M/S
ECHO LVOT STROKE VOLUME INDEX: 32.3 ML/M2
ECHO LVOT SV: 62.7 ML
ECHO LVOT VTI: 15.1 CM
ECHO MV A VELOCITY: 0.83 M/S
ECHO MV AREA VTI: 3.2 CM2
ECHO MV E DECELERATION TIME (DT): 231 MS
ECHO MV E VELOCITY: 0.8 M/S
ECHO MV E/A RATIO: 0.96
ECHO MV E/E' LATERAL: 8.33
ECHO MV E/E' RATIO (AVERAGED): 9.88
ECHO MV E/E' SEPTAL: 11.43
ECHO MV LVOT VTI INDEX: 1.3
ECHO MV MAX VELOCITY: 0.9 M/S
ECHO MV MEAN GRADIENT: 2 MMHG
ECHO MV MEAN VELOCITY: 0.7 M/S
ECHO MV PEAK GRADIENT: 3 MMHG
ECHO MV REGURGITANT PEAK GRADIENT: 38 MMHG
ECHO MV REGURGITANT PEAK VELOCITY: 3.1 M/S
ECHO MV VTI: 19.7 CM
ECHO PV MAX VELOCITY: 0.7 M/S
ECHO PV MEAN GRADIENT: 1 MMHG
ECHO PV MEAN VELOCITY: 0.5 M/S
ECHO PV PEAK GRADIENT: 2 MMHG
ECHO PV VTI: 13 CM
ECHO RA AREA 4C: 10.8 CM2
ECHO RA END SYSTOLIC VOLUME APICAL 4 CHAMBER INDEX BSA: 11 ML/M2
ECHO RA VOLUME: 22 ML
ECHO RIGHT VENTRICULAR SYSTOLIC PRESSURE (RVSP): 29 MMHG
ECHO RV BASAL DIMENSION: 3.1 CM
ECHO RV FREE WALL PEAK S': 10.8 CM/S
ECHO RV TAPSE: 1.7 CM (ref 1.7–?)
ECHO TV REGURGITANT MAX VELOCITY: 2.53 M/S
ECHO TV REGURGITANT PEAK GRADIENT: 26 MMHG
EKG ATRIAL RATE: 115 BPM
EKG DIAGNOSIS: NORMAL
EKG P AXIS: 50 DEGREES
EKG P-R INTERVAL: 146 MS
EKG Q-T INTERVAL: 314 MS
EKG QRS DURATION: 86 MS
EKG QTC CALCULATION (BAZETT): 434 MS
EKG R AXIS: -15 DEGREES
EKG T AXIS: 50 DEGREES
EKG VENTRICULAR RATE: 115 BPM
EOSINOPHIL # BLD: 0.2 K/UL (ref 0–0.4)
EOSINOPHIL NFR BLD: 2 % (ref 0–7)
ERYTHROCYTE [DISTWIDTH] IN BLOOD BY AUTOMATED COUNT: 14.4 % (ref 11.5–14.5)
GLUCOSE SERPL-MCNC: 189 MG/DL (ref 65–100)
HCT VFR BLD AUTO: 33.9 % (ref 36.6–50.3)
HGB BLD-MCNC: 10.8 G/DL (ref 12.1–17)
IMM GRANULOCYTES # BLD AUTO: 0 K/UL (ref 0–0.04)
IMM GRANULOCYTES NFR BLD AUTO: 0 % (ref 0–0.5)
LYMPHOCYTES # BLD: 2.2 K/UL (ref 0.8–3.5)
LYMPHOCYTES NFR BLD: 19 % (ref 12–49)
MCH RBC QN AUTO: 26.7 PG (ref 26–34)
MCHC RBC AUTO-ENTMCNC: 31.9 G/DL (ref 30–36.5)
MCV RBC AUTO: 83.7 FL (ref 80–99)
MONOCYTES # BLD: 0.8 K/UL (ref 0–1)
MONOCYTES NFR BLD: 7 % (ref 5–13)
NEUTS SEG # BLD: 8 K/UL (ref 1.8–8)
NEUTS SEG NFR BLD: 72 % (ref 32–75)
NRBC # BLD: 0 K/UL (ref 0–0.01)
NRBC BLD-RTO: 0 PER 100 WBC
PLATELET # BLD AUTO: 294 K/UL (ref 150–400)
PMV BLD AUTO: 10.5 FL (ref 8.9–12.9)
POTASSIUM SERPL-SCNC: 3.4 MMOL/L (ref 3.5–5.1)
RBC # BLD AUTO: 4.05 M/UL (ref 4.1–5.7)
SODIUM SERPL-SCNC: 139 MMOL/L (ref 136–145)
TSH SERPL DL<=0.05 MIU/L-ACNC: 2.77 UIU/ML (ref 0.45–4.5)
WBC # BLD AUTO: 11.3 K/UL (ref 4.1–11.1)

## 2024-09-25 PROCEDURE — 85025 COMPLETE CBC W/AUTO DIFF WBC: CPT

## 2024-09-25 PROCEDURE — 36415 COLL VENOUS BLD VENIPUNCTURE: CPT

## 2024-09-25 PROCEDURE — 80048 BASIC METABOLIC PNL TOTAL CA: CPT

## 2024-09-25 PROCEDURE — 2580000003 HC RX 258: Performed by: INTERNAL MEDICINE

## 2024-09-25 PROCEDURE — G0378 HOSPITAL OBSERVATION PER HR: HCPCS

## 2024-09-25 PROCEDURE — 6370000000 HC RX 637 (ALT 250 FOR IP): Performed by: INTERNAL MEDICINE

## 2024-09-25 RX ADMIN — ASPIRIN 81 MG: 81 TABLET, COATED ORAL at 09:00

## 2024-09-25 RX ADMIN — AMLODIPINE BESYLATE 10 MG: 5 TABLET ORAL at 09:00

## 2024-09-25 RX ADMIN — ATORVASTATIN CALCIUM 80 MG: 40 TABLET, FILM COATED ORAL at 09:01

## 2024-09-25 RX ADMIN — LATANOPROST 1 DROP: 50 SOLUTION OPHTHALMIC at 09:01

## 2024-09-25 RX ADMIN — SODIUM CHLORIDE, PRESERVATIVE FREE 10 ML: 5 INJECTION INTRAVENOUS at 09:01

## 2024-09-25 RX ADMIN — HYDRALAZINE HYDROCHLORIDE 100 MG: 50 TABLET ORAL at 09:01

## 2024-09-25 RX ADMIN — CETIRIZINE HYDROCHLORIDE 10 MG: 10 TABLET, FILM COATED ORAL at 09:00

## 2024-10-16 ENCOUNTER — HOSPITAL ENCOUNTER (EMERGENCY)
Facility: HOSPITAL | Age: 69
Discharge: HOME | End: 2024-10-16
Payer: MEDICARE

## 2024-10-16 VITALS
OXYGEN SATURATION: 98 % | DIASTOLIC BLOOD PRESSURE: 95 MMHG | HEART RATE: 96 BPM | TEMPERATURE: 97.5 F | BODY MASS INDEX: 31.65 KG/M2 | HEIGHT: 65 IN | RESPIRATION RATE: 18 BRPM | WEIGHT: 190 LBS | SYSTOLIC BLOOD PRESSURE: 145 MMHG

## 2024-10-16 DIAGNOSIS — R04.0 EPISTAXIS: Primary | ICD-10-CM

## 2024-10-16 PROCEDURE — 99283 EMERGENCY DEPT VISIT LOW MDM: CPT

## 2024-10-16 RX ORDER — OXYMETAZOLINE HYDROCHLORIDE 0.05 G/100ML
2 SPRAY NASAL 2 TIMES DAILY
Qty: 6 ML | Refills: 3 | Status: SHIPPED | OUTPATIENT
Start: 2024-10-16 | End: 2024-10-19

## 2024-10-16 ASSESSMENT — PAIN - FUNCTIONAL ASSESSMENT: PAIN_FUNCTIONAL_ASSESSMENT: NONE - DENIES PAIN

## 2024-10-16 NOTE — ED TRIAGE NOTES
Reports started with nose bleed at 100, states bleeding has not stopped. Bleeding controlled with tissue in nose.  Takes blood thinners - unk

## 2024-10-16 NOTE — ED PROVIDER NOTES
capsule Take 1 capsule by mouth 2 times daily as needed for Constipation      ferrous sulfate (IRON 325) 325 (65 Fe) MG tablet Take 1 tablet by mouth daily (with breakfast) 30 tablet 0    loratadine (CLARITIN) 10 MG tablet Take 1 tablet by mouth daily 30 tablet 0    albuterol sulfate HFA (PROVENTIL;VENTOLIN;PROAIR) 108 (90 Base) MCG/ACT inhaler TAKE 2 INHALATIONS UP TO 4X A DAY AS NEEDED FOR SHORTNESS OF BREATH. (Patient not taking: Reported on 9/24/2024)      amLODIPine (NORVASC) 10 MG tablet Take 1 tablet by mouth daily      aspirin 81 MG EC tablet Take 1 tablet by mouth daily      atorvastatin (LIPITOR) 80 MG tablet Take 1 tablet by mouth daily      Cholecalciferol 50 MCG (2000 UT) TABS Take 2,000 Int'l Units by mouth      dapagliflozin (FARXIGA) 10 MG tablet Take 1 tablet by mouth daily (Patient not taking: Reported on 9/24/2024)      gabapentin (NEURONTIN) 300 MG capsule Take 1 capsule by mouth nightly.      hydrALAZINE (APRESOLINE) 100 MG tablet Take 1 tablet by mouth 2 times daily      latanoprost (XALATAN) 0.005 % ophthalmic solution 1 drop      nebivolol (BYSTOLIC) 20 MG TABS tablet Take 1 tablet by mouth daily (Patient not taking: Reported on 9/24/2024)      potassium chloride (MICRO-K) 10 MEQ extended release capsule ceived the following from [facility]: Outside name: potassium chloride SA (MICRO-K) 10 mEq capsule (Patient not taking: Reported on 9/24/2024)      Semaglutide (RYBELSUS) 7 MG TABS 14 mg      ticagrelor (BRILINTA) 90 MG TABS tablet Take 1 tablet by mouth 2 times daily (Patient not taking: Reported on 9/24/2024)         Social Determinants of Health:   Social Determinants of Health     Tobacco Use: Low Risk  (10/16/2024)    Patient History     Smoking Tobacco Use: Never     Smokeless Tobacco Use: Never     Passive Exposure: Not on file   Alcohol Use: Not At Risk (9/23/2024)    AUDIT-C     Frequency of Alcohol Consumption: Never     Average Number of Drinks: Patient does not

## 2024-10-28 ENCOUNTER — OFFICE VISIT (OUTPATIENT)
Age: 69
End: 2024-10-28
Payer: MEDICARE

## 2024-10-28 VITALS
TEMPERATURE: 97.8 F | HEART RATE: 94 BPM | SYSTOLIC BLOOD PRESSURE: 122 MMHG | DIASTOLIC BLOOD PRESSURE: 81 MMHG | BODY MASS INDEX: 33.02 KG/M2 | RESPIRATION RATE: 16 BRPM | OXYGEN SATURATION: 98 % | WEIGHT: 198.2 LBS | HEIGHT: 65 IN

## 2024-10-28 DIAGNOSIS — N18.31 STAGE 3A CHRONIC KIDNEY DISEASE (HCC): Primary | ICD-10-CM

## 2024-10-28 DIAGNOSIS — E21.3 HYPERPARATHYROIDISM (HCC): ICD-10-CM

## 2024-10-28 DIAGNOSIS — N18.31 STAGE 3A CHRONIC KIDNEY DISEASE (HCC): ICD-10-CM

## 2024-10-28 DIAGNOSIS — M80.00XD OSTEOPOROSIS WITH CURRENT PATHOLOGICAL FRACTURE WITH ROUTINE HEALING, UNSPECIFIED OSTEOPOROSIS TYPE, SUBSEQUENT ENCOUNTER: ICD-10-CM

## 2024-10-28 PROCEDURE — G8417 CALC BMI ABV UP PARAM F/U: HCPCS | Performed by: STUDENT IN AN ORGANIZED HEALTH CARE EDUCATION/TRAINING PROGRAM

## 2024-10-28 PROCEDURE — 99203 OFFICE O/P NEW LOW 30 MIN: CPT | Performed by: STUDENT IN AN ORGANIZED HEALTH CARE EDUCATION/TRAINING PROGRAM

## 2024-10-28 PROCEDURE — G8484 FLU IMMUNIZE NO ADMIN: HCPCS | Performed by: STUDENT IN AN ORGANIZED HEALTH CARE EDUCATION/TRAINING PROGRAM

## 2024-10-28 PROCEDURE — 3074F SYST BP LT 130 MM HG: CPT | Performed by: STUDENT IN AN ORGANIZED HEALTH CARE EDUCATION/TRAINING PROGRAM

## 2024-10-28 PROCEDURE — 3017F COLORECTAL CA SCREEN DOC REV: CPT | Performed by: STUDENT IN AN ORGANIZED HEALTH CARE EDUCATION/TRAINING PROGRAM

## 2024-10-28 PROCEDURE — 3079F DIAST BP 80-89 MM HG: CPT | Performed by: STUDENT IN AN ORGANIZED HEALTH CARE EDUCATION/TRAINING PROGRAM

## 2024-10-28 PROCEDURE — 1159F MED LIST DOCD IN RCRD: CPT | Performed by: STUDENT IN AN ORGANIZED HEALTH CARE EDUCATION/TRAINING PROGRAM

## 2024-10-28 PROCEDURE — 1036F TOBACCO NON-USER: CPT | Performed by: STUDENT IN AN ORGANIZED HEALTH CARE EDUCATION/TRAINING PROGRAM

## 2024-10-28 PROCEDURE — 1123F ACP DISCUSS/DSCN MKR DOCD: CPT | Performed by: STUDENT IN AN ORGANIZED HEALTH CARE EDUCATION/TRAINING PROGRAM

## 2024-10-28 PROCEDURE — G8427 DOCREV CUR MEDS BY ELIG CLIN: HCPCS | Performed by: STUDENT IN AN ORGANIZED HEALTH CARE EDUCATION/TRAINING PROGRAM

## 2024-10-28 PROCEDURE — 1126F AMNT PAIN NOTED NONE PRSNT: CPT | Performed by: STUDENT IN AN ORGANIZED HEALTH CARE EDUCATION/TRAINING PROGRAM

## 2024-10-28 RX ORDER — HYDROCHLOROTHIAZIDE 25 MG/1
25 TABLET ORAL DAILY
COMMUNITY

## 2024-10-28 RX ORDER — CLOPIDOGREL BISULFATE 75 MG/1
75 TABLET ORAL DAILY
COMMUNITY
Start: 2024-10-25

## 2024-10-28 ASSESSMENT — ENCOUNTER SYMPTOMS
CONSTIPATION: 0
FACIAL SWELLING: 0
GASTROINTESTINAL NEGATIVE: 1
NAUSEA: 0
SORE THROAT: 0
ABDOMINAL DISTENTION: 0
CHEST TIGHTNESS: 0
VOICE CHANGE: 0
VOMITING: 0
EYE DISCHARGE: 0
RESPIRATORY NEGATIVE: 1
DIARRHEA: 0
RHINORRHEA: 0
EYES NEGATIVE: 1
BACK PAIN: 0
PHOTOPHOBIA: 0
SHORTNESS OF BREATH: 0
TROUBLE SWALLOWING: 0
COUGH: 0
ABDOMINAL PAIN: 0

## 2024-10-28 NOTE — PROGRESS NOTES
\"Have you been to the ER, urgent care clinic since your last visit?  Hospitalized since your last visit?\"    YES - When: approximately 2  weeks ago.  Where and Why: Nose bleeds [facility] and Again in Sept for SOB.    “Have you seen or consulted any other health care providers outside our system since your last visit?”    YES - When: approximately 1 months ago.  Where and Why: [facility].      “Have you had a colorectal cancer screening such as a colonoscopy/FIT/Cologuard?    NO    No colonoscopy on file  No cologuard on file  No FIT/FOBT on file   No flexible sigmoidoscopy on file     “Have you had a diabetic eye exam?”    YES - Where: [facility] 10/24/24     No diabetic eye exam on file     Chief Complaint   Patient presents with    Thyroid Problem     /81 (Site: Right Upper Arm, Position: Sitting, Cuff Size: Medium Adult)   Pulse 94   Temp 97.8 °F (36.6 °C) (Temporal)   Resp 16   Ht 1.651 m (5' 5\")   Wt 89.9 kg (198 lb 3.2 oz)   SpO2 98%   BMI 32.98 kg/m²          
Transportation (Non-Medical): No   Physical Activity: Insufficiently Active (9/15/2022)    Exercise Vital Sign     Days of Exercise per Week: 1 day     Minutes of Exercise per Session: 20 min   Stress: No Stress Concern Present (9/15/2022)    [employer] - Occupational Stress Questionnaire     Feeling of Stress : Not at all   Social Connections: Moderately Integrated (9/15/2022)    Social Connection and Isolation Panel [NHANES]     Frequency of Communication with Friends and Family: Three times a week     Frequency of Social Gatherings with Friends and Family: Twice a week     Attends Yazdanism Services: 1 to 4 times per year     Active Member of Clubs or Organizations: No     Attends Club or Organization Meetings: Never     Marital Status:    Intimate Partner Violence: Not At Risk (9/15/2022)    Humiliation, Afraid, Rape, and Kick questionnaire     Fear of Current or Ex-Partner: No     Emotionally Abused: No     Physically Abused: No     Sexually Abused: No   Housing Stability: High Risk (9/24/2024)    Housing Stability Vital Sign     Unable to Pay for Housing in the Last Year: No     Number of Times Moved in the Last Year: 12     Homeless in the Last Year: No         Allergies   Allergen Reactions    Cortisone Anaphylaxis         Review of Systems:   Review of Systems   Constitutional: Negative.  Negative for activity change, appetite change and fatigue.   HENT: Negative.  Negative for facial swelling, rhinorrhea, sore throat, trouble swallowing and voice change.    Eyes: Negative.  Negative for photophobia, discharge and visual disturbance.   Respiratory: Negative.  Negative for cough, chest tightness and shortness of breath.    Cardiovascular: Negative.  Negative for chest pain and palpitations.   Gastrointestinal: Negative.  Negative for abdominal distention, abdominal pain, constipation, diarrhea, nausea and vomiting.   Endocrine: Negative.  Negative for cold intolerance,

## 2024-11-04 ENCOUNTER — HOSPITAL ENCOUNTER (EMERGENCY)
Facility: HOSPITAL | Age: 69
Discharge: HOME | End: 2024-11-04
Payer: MEDICARE

## 2024-11-04 ENCOUNTER — TELEPHONE (OUTPATIENT)
Age: 69
End: 2024-11-04

## 2024-11-04 VITALS
HEART RATE: 86 BPM | OXYGEN SATURATION: 100 % | SYSTOLIC BLOOD PRESSURE: 118 MMHG | RESPIRATION RATE: 16 BRPM | WEIGHT: 198 LBS | TEMPERATURE: 98 F | HEIGHT: 65 IN | DIASTOLIC BLOOD PRESSURE: 74 MMHG | BODY MASS INDEX: 32.99 KG/M2

## 2024-11-04 DIAGNOSIS — R04.0 EPISTAXIS: Primary | ICD-10-CM

## 2024-11-04 PROCEDURE — 99284 EMERGENCY DEPT VISIT MOD MDM: CPT

## 2024-11-04 RX ORDER — HYDROXYZINE HYDROCHLORIDE 25 MG/1
25 TABLET, FILM COATED ORAL ONCE
COMMUNITY

## 2024-11-04 ASSESSMENT — PAIN DESCRIPTION - LOCATION: LOCATION: ANKLE

## 2024-11-04 ASSESSMENT — PAIN - FUNCTIONAL ASSESSMENT
PAIN_FUNCTIONAL_ASSESSMENT: 0-10
PAIN_FUNCTIONAL_ASSESSMENT: 0-10

## 2024-11-04 ASSESSMENT — PAIN SCALES - GENERAL
PAINLEVEL_OUTOF10: 9
PAINLEVEL_OUTOF10: 9

## 2024-11-04 ASSESSMENT — PAIN DESCRIPTION - ORIENTATION: ORIENTATION: LEFT

## 2024-11-04 NOTE — ED NOTES
Discharge instructions reviewed with pt and pt indicated understanding. Pt ambulated out with all belongings and a steady gait.

## 2024-11-04 NOTE — ED PROVIDER NOTES
[facility] EMERGENCY DEPT  EMERGENCY DEPARTMENT HISTORY AND PHYSICAL EXAM      Date: 11/4/2024  Patient Name: Jesu Schrader  MRN: [id number]  YOB: 1955  Date of evaluation: 11/4/2024  Provider: Jaik Jay PA-C   Note Started: 9:50 AM EST 11/4/24    HISTORY OF PRESENT ILLNESS     Chief Complaint   Patient presents with    Epistaxis       History Provided By: Patient    HPI: Jesu Schrader is a 69 y.o. male with past medical history listed below, presents for evaluation of epistaxis.  Patient states that he has had recurrent epistaxis over the last few months.  Patient does take Plavix.  Previously has been able to stop it with a clamp at home, however did not have this on hand today. He does endorse for a brief moment he was having bleeding from his inner eye which stopped after he stopped compression. He also endorses spitting up blood which resolved when the epistaxis did.  Denies any shortness of breath, difficulty breathing, nausea/vomiting, constipation/diarrhea, abdominal pain, rash, night sweats, or chest pain.     PAST MEDICAL HISTORY   Past Medical History:  Past Medical History:   Diagnosis Date    CAD (coronary artery disease)     pt denies    Diabetes (HCC)     GERD (gastroesophageal reflux disease)     Hypertension     Sleep apnea     CPAP    Thyroid disease        Past Surgical History:  Past Surgical History:   Procedure Laterality Date    CATARACT REMOVAL Bilateral     COLONOSCOPY      ENDOCRINE SURGERY  03/01/2022    LUMBAR FUSION  08/2024    L4-5       Family History:  Family History   Problem Relation Age of Onset    Diabetes Mother        Social History:  Social History     Tobacco Use    Smoking status: Never    Smokeless tobacco: Never   Vaping Use    Vaping status: Never Used   Substance Use Topics    Alcohol use: Never    Drug use: Never       Allergies:  Allergies   Allergen Reactions    Cortisone Anaphylaxis       PCP: Mackenzie Castorena FNP    Current Meds:   No current

## 2024-11-04 NOTE — TELEPHONE ENCOUNTER
Pt daughter called stating pt was taken to er with nose bleed and bleeding from eye this morning and and cough up blood as well, was told to be seen asap with ENT. Please advise

## 2024-11-04 NOTE — ED TRIAGE NOTES
Woke up with nose bleed for the past couple days. Yesterday lasting about 30 minutes. Today began around 0645 and has not stopped    Bleeding from the right eye about 20 minutes after nose bleed and it stopped on its own after about 15 minutes. Pt also began to cough up blood  about 2/3 times.

## 2024-11-05 ENCOUNTER — OFFICE VISIT (OUTPATIENT)
Age: 69
End: 2024-11-05

## 2024-11-05 VITALS
DIASTOLIC BLOOD PRESSURE: 90 MMHG | SYSTOLIC BLOOD PRESSURE: 154 MMHG | HEIGHT: 65 IN | BODY MASS INDEX: 33.32 KG/M2 | WEIGHT: 200 LBS | RESPIRATION RATE: 16 BRPM | OXYGEN SATURATION: 98 % | HEART RATE: 95 BPM

## 2024-11-05 DIAGNOSIS — Z79.01 ANTICOAGULATED: ICD-10-CM

## 2024-11-05 DIAGNOSIS — R04.0 EPISTAXIS: Primary | ICD-10-CM

## 2024-11-05 DIAGNOSIS — Z98.890 HISTORY OF PARATHYROID SURGERY: ICD-10-CM

## 2024-11-05 NOTE — PROGRESS NOTES
possible source, right anterior septum.  No active bleeding.. Septum midline. Turbinates without hypertrophy.  Oral Cavity / Oropharynx: No trismus. Mucosa pink and moist. No lesions. Tongue is midline and mobile. Palate elevates symmetrically. Uvula midline. Tonsils unremarkable. Base of tongue soft. Floor of mouth soft.   Neck: Supple. No adenopathy. Thyroid unremarkable. Palpable laryngeal landmarks. Full neck range of motion.  Well-healed neck incision  Neurologic: CN II - XI intact. Normal gait    PROCEDURE: NASAL CAUTERY    Preoperative diagnosis: Epistaxis  Postoperative diagnosis: Epistaxis    Informed consent was obtained. The right nare was anesthetized with an oxymetazoline and 4% lidocaine soaked cottonoid. This was removed and anterior rhinoscopy demonstrated the likely bleeding source. Silver nitrate cautery was used to cauterize this area with satisfactory result.  Bacitracin ointment was then applied.  The patient tolerated the procedure well.       Assessment and Plan:   Epistaxis  Anticoagulation  History of parathyroidectomy  -Discussed with patient, epistaxis management precautions  -Discussed nasal moisture techniques  -Right nare cauterized today as above  -Discussed ultimately if epistaxis issues continue, he may want to check with his primary care provider or cardiologist about whether he is able to stop his blood thinner for period of time  -He has an appointment with his PCP coming up as well  -Otherwise, in regards to cold intolerance, discussed that should not be related to the parathyroidectomy  -He has seen endocrinology and has had recent blood work ordered so would agree with this workup  -He has an appointment with Dr. Kang coming up next month and keep this appointment to follow-up the nosebleeds    The patient was instructed to return to clinic if no improvement or progression of symptoms. Signs to watch out for reviewed.      MD Kelvin Haro [facility]

## 2024-11-08 LAB
1,25(OH)2D SERPL-MCNC: 27.1 PG/ML (ref 24.8–81.5)
25(OH)D3+25(OH)D2 SERPL-MCNC: 24.3 NG/ML (ref 30–100)
ALBUMIN SERPL-MCNC: 3.7 G/DL (ref 3.9–4.9)
ALP SERPL-CCNC: 129 IU/L (ref 44–121)
ALT SERPL-CCNC: 14 IU/L (ref 0–44)
AST SERPL-CCNC: 12 IU/L (ref 0–40)
BILIRUB SERPL-MCNC: 0.4 MG/DL (ref 0–1.2)
BUN SERPL-MCNC: 11 MG/DL (ref 8–27)
BUN/CREAT SERPL: 9 (ref 10–24)
CA-I SERPL ISE-MCNC: 4.9 MG/DL (ref 4.5–5.6)
CALCIUM SERPL-MCNC: 8.8 MG/DL (ref 8.6–10.2)
CHLORIDE SERPL-SCNC: 106 MMOL/L (ref 96–106)
CO2 SERPL-SCNC: 24 MMOL/L (ref 20–29)
CREAT SERPL-MCNC: 1.29 MG/DL (ref 0.76–1.27)
EGFRCR SERPLBLD CKD-EPI 2021: 61 ML/MIN/1.73
GLOBULIN SER CALC-MCNC: 3.8 G/DL (ref 1.5–4.5)
GLUCOSE SERPL-MCNC: 196 MG/DL (ref 70–99)
MAGNESIUM SERPL-MCNC: 1.3 MG/DL (ref 1.6–2.3)
PHOSPHATE SERPL-MCNC: 3.5 MG/DL (ref 2.8–4.1)
POTASSIUM SERPL-SCNC: 3.8 MMOL/L (ref 3.5–5.2)
PROT SERPL-MCNC: 7.5 G/DL (ref 6–8.5)
PTH-INTACT SERPL-MCNC: 48 PG/ML (ref 15–65)
SODIUM SERPL-SCNC: 144 MMOL/L (ref 134–144)

## 2024-11-11 LAB
ALBUMIN SERPL ELPH-MCNC: 3.4 G/DL (ref 2.9–4.4)
ALBUMIN/GLOB SERPL: 0.8 {RATIO} (ref 0.7–1.7)
ALPHA1 GLOB SERPL ELPH-MCNC: 0.3 G/DL (ref 0–0.4)
ALPHA2 GLOB SERPL ELPH-MCNC: 0.9 G/DL (ref 0.4–1)
B-GLOBULIN SERPL ELPH-MCNC: 1.3 G/DL (ref 0.7–1.3)
GAMMA GLOB SERPL ELPH-MCNC: 1.6 G/DL (ref 0.4–1.8)
GLOBULIN SER CALC-MCNC: 4.1 G/DL (ref 2.2–3.9)
LABORATORY COMMENT REPORT: ABNORMAL
M PROTEIN SERPL ELPH-MCNC: ABNORMAL G/DL

## 2024-12-02 ENCOUNTER — OFFICE VISIT (OUTPATIENT)
Age: 69
End: 2024-12-02
Payer: MEDICARE

## 2024-12-02 VITALS
SYSTOLIC BLOOD PRESSURE: 163 MMHG | HEIGHT: 65 IN | HEART RATE: 89 BPM | RESPIRATION RATE: 16 BRPM | TEMPERATURE: 97.9 F | OXYGEN SATURATION: 96 % | WEIGHT: 208.2 LBS | DIASTOLIC BLOOD PRESSURE: 78 MMHG | BODY MASS INDEX: 34.69 KG/M2

## 2024-12-02 DIAGNOSIS — E04.1 THYROID NODULE: Primary | ICD-10-CM

## 2024-12-02 PROCEDURE — 3017F COLORECTAL CA SCREEN DOC REV: CPT | Performed by: STUDENT IN AN ORGANIZED HEALTH CARE EDUCATION/TRAINING PROGRAM

## 2024-12-02 PROCEDURE — 3078F DIAST BP <80 MM HG: CPT | Performed by: STUDENT IN AN ORGANIZED HEALTH CARE EDUCATION/TRAINING PROGRAM

## 2024-12-02 PROCEDURE — G8417 CALC BMI ABV UP PARAM F/U: HCPCS | Performed by: STUDENT IN AN ORGANIZED HEALTH CARE EDUCATION/TRAINING PROGRAM

## 2024-12-02 PROCEDURE — 99213 OFFICE O/P EST LOW 20 MIN: CPT | Performed by: STUDENT IN AN ORGANIZED HEALTH CARE EDUCATION/TRAINING PROGRAM

## 2024-12-02 PROCEDURE — G8484 FLU IMMUNIZE NO ADMIN: HCPCS | Performed by: STUDENT IN AN ORGANIZED HEALTH CARE EDUCATION/TRAINING PROGRAM

## 2024-12-02 PROCEDURE — 1159F MED LIST DOCD IN RCRD: CPT | Performed by: STUDENT IN AN ORGANIZED HEALTH CARE EDUCATION/TRAINING PROGRAM

## 2024-12-02 PROCEDURE — G8427 DOCREV CUR MEDS BY ELIG CLIN: HCPCS | Performed by: STUDENT IN AN ORGANIZED HEALTH CARE EDUCATION/TRAINING PROGRAM

## 2024-12-02 PROCEDURE — 1125F AMNT PAIN NOTED PAIN PRSNT: CPT | Performed by: STUDENT IN AN ORGANIZED HEALTH CARE EDUCATION/TRAINING PROGRAM

## 2024-12-02 PROCEDURE — 3077F SYST BP >= 140 MM HG: CPT | Performed by: STUDENT IN AN ORGANIZED HEALTH CARE EDUCATION/TRAINING PROGRAM

## 2024-12-02 PROCEDURE — 1036F TOBACCO NON-USER: CPT | Performed by: STUDENT IN AN ORGANIZED HEALTH CARE EDUCATION/TRAINING PROGRAM

## 2024-12-02 PROCEDURE — 1123F ACP DISCUSS/DSCN MKR DOCD: CPT | Performed by: STUDENT IN AN ORGANIZED HEALTH CARE EDUCATION/TRAINING PROGRAM

## 2024-12-02 RX ORDER — METOPROLOL SUCCINATE 50 MG/1
50 TABLET, EXTENDED RELEASE ORAL DAILY
COMMUNITY
Start: 2024-11-13

## 2024-12-02 ASSESSMENT — ENCOUNTER SYMPTOMS
BACK PAIN: 0
COUGH: 0
RESPIRATORY NEGATIVE: 1
CONSTIPATION: 0
CHEST TIGHTNESS: 0
EYE DISCHARGE: 0
VOICE CHANGE: 0
FACIAL SWELLING: 0
SORE THROAT: 0
ABDOMINAL DISTENTION: 0
NAUSEA: 0
TROUBLE SWALLOWING: 0
GASTROINTESTINAL NEGATIVE: 1
DIARRHEA: 0
PHOTOPHOBIA: 0
ABDOMINAL PAIN: 0
SHORTNESS OF BREATH: 0
RHINORRHEA: 0
EYES NEGATIVE: 1
VOMITING: 0

## 2024-12-02 NOTE — PROGRESS NOTES
[facility] [place] [facility]                                      Patient Information Name : Jesu Schrader 69 y.o.   YOB: 1955         Referred by: Mackenzie Castorena, MARITZA         Chief complaint- Hyperparathyroidism    History of Present Illness: Jesu Schrader is a 69 y.o. male here for follow up visit of  hyperparathyroidism     Interval hx-     The patient notes that he is doing well. He has some back pain from the spine surgery for spontaneous vertebral fractures.   He reports completing all his labs but hasn't scheduled DXA scan yet . He does not c/o any swelling in his neck , no hx difficulty swallowing food and water and no difficulty breathing in the supine position.     Background hx:  The patient notes that he had right upper parathyroidectomy in 04/2022 in [facility] by Dr Kang ( ENT) as he was told that his parathyroid hormone levels are elevated . On sestamibi imaging he was found to have a right superior parathyroid adenoma. He was being followed by Dr New at the time.     Frozen section intraoperatively showed a 3 gm right superior parathyroid adenoma , left sided exploration was not done . Post operative recovery was uneventful .     He was also diagnosed with Osteoporosis and received Reclast infusion in 2022. However he hasn't had any other infusions in the last 2 years.     DEXA scan was done in 10/2021:     FINDINGS:     LUMBAR SPINE:  Region: L1-L4  BMD:  0.893 g/sq cm  T score:  -2.8  Z score: -2.0  Measurements indicate osteoporosis lumbar spine.        Region: RIGHT  FEMORAL NECK  BMD: 0.717 g/sq cm  T score:  -2.3  Z score:  -1.0     Region: RIGHT HIP TOTAL  BMD: 0.917 g/sq cm  T score:  -1.5  Z score:  -0.6  Measurements indicate osteopenia right hip.        Region: LEFT FEMORAL NECK  BMD: 0.853 g/sq cm  T score:  -1.4  Z score:  -0.1     Region: LEFT HIP TOTAL  BMD: 1.087 g/sq cm  T score:  -0.5  Z score:  0.4  Measurements

## 2024-12-02 NOTE — PROGRESS NOTES
\"Have you been to the ER, urgent care clinic since your last visit?  Hospitalized since your last visit?\"    NO    “Have you seen or consulted any other health care providers outside our system since your last visit?”    NO      “Have you had a colorectal cancer screening such as a colonoscopy/FIT/Cologuard?    NO    No colonoscopy on file  No cologuard on file  No FIT/FOBT on file   No flexible sigmoidoscopy on file     “Have you had a diabetic eye exam?”    YES - Where: 10/2024 [facility]   No diabetic eye exam on file     Chief Complaint   Patient presents with    Follow-up    Thyroid Problem     BP (!) 163/78 (Site: Right Upper Arm, Position: Sitting, Cuff Size: Medium Adult)   Pulse 89   Temp 97.9 °F (36.6 °C) (Temporal)   Resp 16   Ht 1.651 m (5' 5\")   Wt 94.4 kg (208 lb 3.2 oz)   SpO2 96%   BMI 34.65 kg/m²

## 2024-12-11 ENCOUNTER — OFFICE VISIT (OUTPATIENT)
Age: 69
End: 2024-12-11
Payer: MEDICARE

## 2024-12-11 VITALS
OXYGEN SATURATION: 96 % | DIASTOLIC BLOOD PRESSURE: 90 MMHG | HEIGHT: 65 IN | HEART RATE: 78 BPM | SYSTOLIC BLOOD PRESSURE: 150 MMHG | BODY MASS INDEX: 34.66 KG/M2 | WEIGHT: 208 LBS | RESPIRATION RATE: 18 BRPM

## 2024-12-11 DIAGNOSIS — H90.3 SENSORINEURAL HEARING LOSS, BILATERAL: ICD-10-CM

## 2024-12-11 DIAGNOSIS — Z98.890 HISTORY OF PARATHYROID SURGERY: ICD-10-CM

## 2024-12-11 DIAGNOSIS — R04.0 EPISTAXIS: Primary | ICD-10-CM

## 2024-12-11 PROCEDURE — 3080F DIAST BP >= 90 MM HG: CPT | Performed by: OTOLARYNGOLOGY

## 2024-12-11 PROCEDURE — 1123F ACP DISCUSS/DSCN MKR DOCD: CPT | Performed by: OTOLARYNGOLOGY

## 2024-12-11 PROCEDURE — G8417 CALC BMI ABV UP PARAM F/U: HCPCS | Performed by: OTOLARYNGOLOGY

## 2024-12-11 PROCEDURE — 99213 OFFICE O/P EST LOW 20 MIN: CPT | Performed by: OTOLARYNGOLOGY

## 2024-12-11 PROCEDURE — G8427 DOCREV CUR MEDS BY ELIG CLIN: HCPCS | Performed by: OTOLARYNGOLOGY

## 2024-12-11 PROCEDURE — 1036F TOBACCO NON-USER: CPT | Performed by: OTOLARYNGOLOGY

## 2024-12-11 PROCEDURE — G8484 FLU IMMUNIZE NO ADMIN: HCPCS | Performed by: OTOLARYNGOLOGY

## 2024-12-11 PROCEDURE — 3077F SYST BP >= 140 MM HG: CPT | Performed by: OTOLARYNGOLOGY

## 2024-12-11 PROCEDURE — 1159F MED LIST DOCD IN RCRD: CPT | Performed by: OTOLARYNGOLOGY

## 2024-12-11 PROCEDURE — 3017F COLORECTAL CA SCREEN DOC REV: CPT | Performed by: OTOLARYNGOLOGY

## 2024-12-11 ASSESSMENT — ENCOUNTER SYMPTOMS
SORE THROAT: 0
RHINORRHEA: 0
TROUBLE SWALLOWING: 0
SHORTNESS OF BREATH: 0
VOMITING: 0
EYE DISCHARGE: 0
ABDOMINAL PAIN: 0
VOICE CHANGE: 0
SINUS PAIN: 0
BACK PAIN: 0
APNEA: 0
COUGH: 0
STRIDOR: 0
NAUSEA: 0
CHOKING: 0
SINUS PRESSURE: 0
EYE ITCHING: 0

## 2024-12-11 NOTE — PROGRESS NOTES
Subjective:    Jesu Schrader   69 y.o.   1955     Followup Visit    History of Present Illness:    Chief Complaint: Hyperparathyroidism     History of Present Illness: Jesu Schrader is a 67 y.o. year old male who presents today accompanied by daughter for discussion of    Referred by Dr. New, endocrinology     Reports right hip pain for 2 years  Saw PCP In June and found to have elevated calcium and intermittent abnormal PTH  Has had no imaging     Denies difficulty swallowing, voice changes   +fatigue, muscle cramping     Per endo note: Onset: 1 year back     Symptoms of hypercalcemia: Constipation, fatigue.  Denies any polyuria, polydipsia, stomach pain, kidney stones, bone fractures, episodes of confusion.  :  Calcium slightly elevated at 10.8 (8.6-10.2)     11-:  Ionized calcium borderline elevated at 5.7 (4.5-5.6)     1-:  Calcium elevated at 11.1 (8.6-10.2)     5-:  Calcium elevated at 10.7 (8.6-10.2)     5-:  Ionized calcium slightly elevated at 5.8 (4.5-5.6)  PTH normal at 46 (15-65)  Renal function normal.     9-:  Calcium borderline elevated at 10.5 (8.6-10.2)  PTH elevated at 67 (15-65)   It appears that patient has primary hyper parathyroidism.  Currently calcium elevation is mild, may be because of vitamin D deficiency.  Optimize vitamin D replacement with vitamin D 2000 units twice a day and repeated labs.     11-:  Calcium borderline elevated at 10.4 (8.6-10.2)  PTH at the upper end of normal range at 61      10-7-2021 bone density scan:  Lumbar spine T score -2.8  Right femoral neck T score -2.3  Right hip total T score -1.5  Left femoral neck T score -1.4  Left hip total T score -0.5  Plan:  Primary hyperparathyroidism.  Although calcium elevation is mild, patient has osteoporosis and he meets criteria for surgery.  He will need treatment for osteoporosis after surgery.  Referring patient to ENT.  I will see him back in 2

## 2024-12-20 ENCOUNTER — HOSPITAL ENCOUNTER (OUTPATIENT)
Facility: HOSPITAL | Age: 69
Discharge: HOME | End: 2024-12-23
Attending: STUDENT IN AN ORGANIZED HEALTH CARE EDUCATION/TRAINING PROGRAM
Payer: MEDICARE

## 2024-12-20 ENCOUNTER — APPOINTMENT (OUTPATIENT)
Facility: HOSPITAL | Age: 69
End: 2024-12-20
Attending: STUDENT IN AN ORGANIZED HEALTH CARE EDUCATION/TRAINING PROGRAM
Payer: MEDICARE

## 2024-12-20 DIAGNOSIS — E04.1 THYROID NODULE: ICD-10-CM

## 2024-12-20 PROCEDURE — 76536 US EXAM OF HEAD AND NECK: CPT

## 2025-01-14 ENCOUNTER — HOSPITAL ENCOUNTER (OUTPATIENT)
Facility: HOSPITAL | Age: 70
Discharge: HOME | End: 2025-01-17
Attending: STUDENT IN AN ORGANIZED HEALTH CARE EDUCATION/TRAINING PROGRAM
Payer: MEDICARE

## 2025-01-14 DIAGNOSIS — E21.3 HYPERPARATHYROIDISM (HCC): ICD-10-CM

## 2025-01-14 PROCEDURE — 77080 DXA BONE DENSITY AXIAL: CPT

## 2025-03-05 ENCOUNTER — OFFICE VISIT (OUTPATIENT)
Age: 70
End: 2025-03-05
Payer: MEDICARE

## 2025-03-05 VITALS
RESPIRATION RATE: 16 BRPM | HEIGHT: 65 IN | HEART RATE: 83 BPM | SYSTOLIC BLOOD PRESSURE: 147 MMHG | DIASTOLIC BLOOD PRESSURE: 80 MMHG | WEIGHT: 217.4 LBS | TEMPERATURE: 98.3 F | BODY MASS INDEX: 36.22 KG/M2 | OXYGEN SATURATION: 95 %

## 2025-03-05 DIAGNOSIS — M81.0 OSTEOPOROSIS, UNSPECIFIED OSTEOPOROSIS TYPE, UNSPECIFIED PATHOLOGICAL FRACTURE PRESENCE: ICD-10-CM

## 2025-03-05 DIAGNOSIS — M81.0 OSTEOPOROSIS, UNSPECIFIED OSTEOPOROSIS TYPE, UNSPECIFIED PATHOLOGICAL FRACTURE PRESENCE: Primary | ICD-10-CM

## 2025-03-05 PROCEDURE — 99213 OFFICE O/P EST LOW 20 MIN: CPT | Performed by: STUDENT IN AN ORGANIZED HEALTH CARE EDUCATION/TRAINING PROGRAM

## 2025-03-05 PROCEDURE — 1123F ACP DISCUSS/DSCN MKR DOCD: CPT | Performed by: STUDENT IN AN ORGANIZED HEALTH CARE EDUCATION/TRAINING PROGRAM

## 2025-03-05 PROCEDURE — 1036F TOBACCO NON-USER: CPT | Performed by: STUDENT IN AN ORGANIZED HEALTH CARE EDUCATION/TRAINING PROGRAM

## 2025-03-05 PROCEDURE — 3079F DIAST BP 80-89 MM HG: CPT | Performed by: STUDENT IN AN ORGANIZED HEALTH CARE EDUCATION/TRAINING PROGRAM

## 2025-03-05 PROCEDURE — G8417 CALC BMI ABV UP PARAM F/U: HCPCS | Performed by: STUDENT IN AN ORGANIZED HEALTH CARE EDUCATION/TRAINING PROGRAM

## 2025-03-05 PROCEDURE — 3077F SYST BP >= 140 MM HG: CPT | Performed by: STUDENT IN AN ORGANIZED HEALTH CARE EDUCATION/TRAINING PROGRAM

## 2025-03-05 PROCEDURE — 3017F COLORECTAL CA SCREEN DOC REV: CPT | Performed by: STUDENT IN AN ORGANIZED HEALTH CARE EDUCATION/TRAINING PROGRAM

## 2025-03-05 PROCEDURE — G8427 DOCREV CUR MEDS BY ELIG CLIN: HCPCS | Performed by: STUDENT IN AN ORGANIZED HEALTH CARE EDUCATION/TRAINING PROGRAM

## 2025-03-05 PROCEDURE — 1126F AMNT PAIN NOTED NONE PRSNT: CPT | Performed by: STUDENT IN AN ORGANIZED HEALTH CARE EDUCATION/TRAINING PROGRAM

## 2025-03-05 PROCEDURE — 1159F MED LIST DOCD IN RCRD: CPT | Performed by: STUDENT IN AN ORGANIZED HEALTH CARE EDUCATION/TRAINING PROGRAM

## 2025-03-05 RX ORDER — PHENOL 1.4 %
1 AEROSOL, SPRAY (ML) MUCOUS MEMBRANE DAILY
Qty: 180 TABLET | Refills: 5 | Status: SHIPPED | OUTPATIENT
Start: 2025-03-05

## 2025-03-05 ASSESSMENT — ENCOUNTER SYMPTOMS
TROUBLE SWALLOWING: 0
BACK PAIN: 0
RHINORRHEA: 0
COUGH: 0
NAUSEA: 0
VOICE CHANGE: 0
EYES NEGATIVE: 1
VOMITING: 0
DIARRHEA: 0
ABDOMINAL DISTENTION: 0
SORE THROAT: 0
ABDOMINAL PAIN: 0
RESPIRATORY NEGATIVE: 1
EYE DISCHARGE: 0
PHOTOPHOBIA: 0
CONSTIPATION: 0
SHORTNESS OF BREATH: 0
CHEST TIGHTNESS: 0
GASTROINTESTINAL NEGATIVE: 1
FACIAL SWELLING: 0

## 2025-03-05 NOTE — PROGRESS NOTES
\"Have you been to the ER, urgent care clinic since your last visit?  Hospitalized since your last visit?\"    YES - When: approximately 3  weeks ago.  Where and Why: [facility].    “Have you seen or consulted any other health care providers outside our system since your last visit?”    NO    “Have you had a colorectal cancer screening such as a colonoscopy/FIT/Cologuard?    NO    No colonoscopy on file  No cologuard on file  No FIT/FOBT on file   No flexible sigmoidoscopy on file     “Have you had a diabetic eye exam?”    YES - Where: [facility] 06/2024     No diabetic eye exam on file     Chief Complaint   Patient presents with    Follow-up    Thyroid Problem          
you for allowing me to participate in the care of this patient.    Daja Rico MD       Patient verbalized understanding     Voice-recognition software was used to generate this report, which may result in some phonetic-based errors in the grammar and contents.  Even though attempts were made to correct all the mistakes, some may have been missed and remained in the body of the report.

## 2025-03-06 LAB
25(OH)D3+25(OH)D2 SERPL-MCNC: 29.6 NG/ML (ref 30–100)
ALBUMIN SERPL-MCNC: 4.1 G/DL (ref 3.9–4.9)
ALP SERPL-CCNC: 117 IU/L (ref 44–121)
ALT SERPL-CCNC: 19 IU/L (ref 0–44)
AST SERPL-CCNC: 20 IU/L (ref 0–40)
BILIRUB SERPL-MCNC: 0.6 MG/DL (ref 0–1.2)
BUN SERPL-MCNC: 11 MG/DL (ref 8–27)
BUN/CREAT SERPL: 9 (ref 10–24)
CALCIUM SERPL-MCNC: 9 MG/DL (ref 8.6–10.2)
CHLORIDE SERPL-SCNC: 107 MMOL/L (ref 96–106)
CO2 SERPL-SCNC: 23 MMOL/L (ref 20–29)
CREAT SERPL-MCNC: 1.23 MG/DL (ref 0.76–1.27)
EGFRCR SERPLBLD CKD-EPI 2021: 64 ML/MIN/1.73
GLOBULIN SER CALC-MCNC: 3.6 G/DL (ref 1.5–4.5)
GLUCOSE SERPL-MCNC: 155 MG/DL (ref 70–99)
POTASSIUM SERPL-SCNC: 3.8 MMOL/L (ref 3.5–5.2)
PROT SERPL-MCNC: 7.7 G/DL (ref 6–8.5)
SODIUM SERPL-SCNC: 144 MMOL/L (ref 134–144)

## 2025-03-07 ENCOUNTER — FOLLOWUP TELEPHONE ENCOUNTER (OUTPATIENT)
Age: 70
End: 2025-03-07

## 2025-03-07 RX ORDER — MULTIVIT-MIN/IRON/FOLIC ACID/K 18-600-40
2000 CAPSULE ORAL DAILY
Qty: 90 CAPSULE | Refills: 0 | OUTPATIENT
Start: 2025-03-07

## 2025-03-07 NOTE — PROGRESS NOTES
Follow up labs :   Vit D 29.6   GFR 64     Patient notes that he is taking Vit D  400 IU daily     Plan-   Start Vit D 2000 IU daily   Sending in Reclast 5 mg IV annually order to infusion center     Discussed the above plan with the patient

## 2025-03-19 ENCOUNTER — HOSPITAL ENCOUNTER (EMERGENCY)
Facility: HOSPITAL | Age: 70
Discharge: HOME | End: 2025-03-19
Attending: STUDENT IN AN ORGANIZED HEALTH CARE EDUCATION/TRAINING PROGRAM
Payer: MEDICARE

## 2025-03-19 ENCOUNTER — APPOINTMENT (OUTPATIENT)
Facility: HOSPITAL | Age: 70
End: 2025-03-19
Payer: MEDICARE

## 2025-03-19 VITALS
OXYGEN SATURATION: 100 % | TEMPERATURE: 99 F | HEIGHT: 65 IN | WEIGHT: 215 LBS | HEART RATE: 98 BPM | BODY MASS INDEX: 35.82 KG/M2 | SYSTOLIC BLOOD PRESSURE: 145 MMHG | DIASTOLIC BLOOD PRESSURE: 78 MMHG | RESPIRATION RATE: 18 BRPM

## 2025-03-19 DIAGNOSIS — S93.402A SPRAIN OF LEFT ANKLE, UNSPECIFIED LIGAMENT, INITIAL ENCOUNTER: Primary | ICD-10-CM

## 2025-03-19 DIAGNOSIS — S83.92XA SPRAIN OF LEFT KNEE, UNSPECIFIED LIGAMENT, INITIAL ENCOUNTER: ICD-10-CM

## 2025-03-19 PROCEDURE — 73610 X-RAY EXAM OF ANKLE: CPT

## 2025-03-19 PROCEDURE — 73562 X-RAY EXAM OF KNEE 3: CPT

## 2025-03-19 PROCEDURE — 99283 EMERGENCY DEPT VISIT LOW MDM: CPT

## 2025-03-19 ASSESSMENT — LIFESTYLE VARIABLES
HOW MANY STANDARD DRINKS CONTAINING ALCOHOL DO YOU HAVE ON A TYPICAL DAY: PATIENT DOES NOT DRINK
HOW OFTEN DO YOU HAVE A DRINK CONTAINING ALCOHOL: NEVER

## 2025-03-19 ASSESSMENT — PAIN - FUNCTIONAL ASSESSMENT: PAIN_FUNCTIONAL_ASSESSMENT: 0-10

## 2025-03-19 ASSESSMENT — PAIN SCALES - GENERAL: PAINLEVEL_OUTOF10: 8

## 2025-03-19 NOTE — ED TRIAGE NOTES
Pt reporting he had a GLF at [employer] onto concrete. Pt reports taking Plavix but denies hitting head or no LOC.     Pt reporting left knee and ankle pain.     Pt ambulatory with a cane that he normally uses to triage.

## 2025-03-21 ENCOUNTER — OFFICE VISIT (OUTPATIENT)
Age: 70
End: 2025-03-21
Payer: MEDICARE

## 2025-03-21 VITALS — BODY MASS INDEX: 35.78 KG/M2 | HEIGHT: 65 IN

## 2025-03-21 DIAGNOSIS — S93.402A SPRAIN OF LEFT ANKLE, UNSPECIFIED LIGAMENT, INITIAL ENCOUNTER: Primary | ICD-10-CM

## 2025-03-21 DIAGNOSIS — S80.02XA CONTUSION OF LEFT KNEE, INITIAL ENCOUNTER: ICD-10-CM

## 2025-03-21 PROCEDURE — 1123F ACP DISCUSS/DSCN MKR DOCD: CPT | Performed by: STUDENT IN AN ORGANIZED HEALTH CARE EDUCATION/TRAINING PROGRAM

## 2025-03-21 PROCEDURE — 99203 OFFICE O/P NEW LOW 30 MIN: CPT | Performed by: STUDENT IN AN ORGANIZED HEALTH CARE EDUCATION/TRAINING PROGRAM

## 2025-03-21 PROCEDURE — 1159F MED LIST DOCD IN RCRD: CPT | Performed by: STUDENT IN AN ORGANIZED HEALTH CARE EDUCATION/TRAINING PROGRAM

## 2025-03-21 PROCEDURE — 1125F AMNT PAIN NOTED PAIN PRSNT: CPT | Performed by: STUDENT IN AN ORGANIZED HEALTH CARE EDUCATION/TRAINING PROGRAM

## 2025-03-21 ASSESSMENT — PATIENT HEALTH QUESTIONNAIRE - PHQ9
SUM OF ALL RESPONSES TO PHQ QUESTIONS 1-9: 0
SUM OF ALL RESPONSES TO PHQ QUESTIONS 1-9: 0
1. LITTLE INTEREST OR PLEASURE IN DOING THINGS: NOT AT ALL
2. FEELING DOWN, DEPRESSED OR HOPELESS: NOT AT ALL
SUM OF ALL RESPONSES TO PHQ QUESTIONS 1-9: 0
SUM OF ALL RESPONSES TO PHQ QUESTIONS 1-9: 0

## 2025-03-21 NOTE — PROGRESS NOTES
Identified pt with two pt identifiers (name and ). Reviewed chart in preparation for visit and have obtained necessary documentation.    Jesu Schrader is a 70 y.o. male New Patient (ED Follow up Left Ankle pain )  .    Vitals:    25 0916   Height: 1.651 m (5' 5\")          1. Have you been to the ER, urgent care clinic since your last visit?  Hospitalized since your last visit?  yes - ER     2. Have you seen or consulted any other health care providers outside of the [facility] since your last visit?  Include any pap smears or colon screening.  no

## 2025-03-21 NOTE — PROGRESS NOTES
Orthopedic Surgery Consult Note  Date of consult: 3/21/2025      CC:  left ankle pain  Left knee pain    HPI: 71 yo male present for left ankle pain after a GLF at [facility] on 3/19/25.   Ambulates with a cane at baseline. Has been using a walker since recent fall.   Pain located over the anterior and lateral ankle. Improved with wearing a boot that he was given. Able to bear weight with some pain.   He also has pain over the anterior knee in the distal patella tendon and over the tibial tubercle. Had a small laceration over tib tub. Pain with weightbearing but tolerates enough for ambulation with a walker. No other injuries.       PMH:  Past Medical History:   Diagnosis Date    CAD (coronary artery disease)     pt denies    Diabetes (HCC)     GERD (gastroesophageal reflux disease)     Hypertension     Sleep apnea     CPAP    Thyroid disease        PSxHx:  Past Surgical History:   Procedure Laterality Date    CATARACT REMOVAL Bilateral     COLONOSCOPY      ENDOCRINE SURGERY  03/01/2022    LUMBAR FUSION  08/2024    L4-5       Meds:    Current Outpatient Medications:     vitamin D 50 MCG (2000 UT) CAPS capsule, Take 1 capsule by mouth daily, Disp: 90 capsule, Rfl: 0    calcium carbonate 600 MG TABS tablet, Take 1 tablet by mouth daily, Disp: 180 tablet, Rfl: 5    metoprolol succinate (TOPROL XL) 50 MG extended release tablet, Take 1 tablet by mouth daily, Disp: , Rfl:     hydrOXYzine HCl (ATARAX) 25 MG tablet, Take 1 tablet by mouth once bedtime, Disp: , Rfl:     clopidogrel (PLAVIX) 75 MG tablet, Take 1 tablet by mouth daily, Disp: , Rfl:     hydroCHLOROthiazide (HYDRODIURIL) 25 MG tablet, Take 1 tablet by mouth daily, Disp: , Rfl:     docusate sodium (COLACE) 100 MG capsule, Take 1 capsule by mouth 2 times daily as needed for Constipation, Disp: , Rfl:     ferrous sulfate (IRON 325) 325 (65 Fe) MG tablet, Take 1 tablet by mouth daily (with breakfast), Disp: 30 tablet, Rfl: 0    loratadine (CLARITIN) 10 MG tablet, Take

## 2025-03-21 NOTE — ED PROVIDER NOTES
Clinician of Record. Halima Arizmendi MD (electronically signed)    (Please note that parts of this dictation were completed with voice recognition software. Quite often unanticipated grammatical, syntax, homophones, and other interpretive errors are inadvertently transcribed by the computer software. Please disregards these errors. Please excuse any errors that have escaped final proofreading.)     Ravi Ruano, Halima FRANCOIS MD  03/21/25 [id number]

## 2025-03-27 ENCOUNTER — HOSPITAL ENCOUNTER (OUTPATIENT)
Facility: HOSPITAL | Age: 70
Setting detail: INFUSION SERIES
Discharge: HOME | End: 2025-03-27
Payer: MEDICARE

## 2025-03-27 VITALS
HEART RATE: 80 BPM | OXYGEN SATURATION: 100 % | RESPIRATION RATE: 18 BRPM | SYSTOLIC BLOOD PRESSURE: 152 MMHG | WEIGHT: 217.37 LBS | DIASTOLIC BLOOD PRESSURE: 86 MMHG | HEIGHT: 65 IN | TEMPERATURE: 98 F | BODY MASS INDEX: 36.22 KG/M2

## 2025-03-27 PROCEDURE — 96374 THER/PROPH/DIAG INJ IV PUSH: CPT

## 2025-03-27 PROCEDURE — 6360000002 HC RX W HCPCS: Performed by: STUDENT IN AN ORGANIZED HEALTH CARE EDUCATION/TRAINING PROGRAM

## 2025-03-27 RX ORDER — ZOLEDRONIC ACID 0.05 MG/ML
5 INJECTION, SOLUTION INTRAVENOUS ONCE
Status: COMPLETED | OUTPATIENT
Start: 2025-03-27 | End: 2025-03-27

## 2025-03-27 RX ADMIN — ZOLEDRONIC ACID 5 MG: 5 INJECTION INTRAVENOUS at 11:01

## 2025-03-27 NOTE — PROGRESS NOTES
Patient ambulated self into SDS for Reclast infusion, VS obtained, no complaints of pain at this time.     IV obtained and Reclast infusion given, patient tolerated well.     Discharge instructions/education provided to patient, VS obtained afterwards, no complaints of pain at time of discharge.     Patient ambulated self out of SDS afterwards.

## 2025-04-15 ENCOUNTER — HOSPITAL ENCOUNTER (OUTPATIENT)
Facility: HOSPITAL | Age: 70
Setting detail: RECURRING SERIES
Discharge: HOME | End: 2025-04-18
Payer: MEDICARE

## 2025-04-15 PROCEDURE — 97110 THERAPEUTIC EXERCISES: CPT

## 2025-04-15 PROCEDURE — 97161 PT EVAL LOW COMPLEX 20 MIN: CPT

## 2025-04-15 NOTE — THERAPY EVALUATION
Exercise (timed):  increase ROM, strength, coordination, balance, and proprioception to improve patient's ability to progress to PLOF and address remaining functional goals. (see flow sheet as applicable)    Details if applicable:           Details if applicable:           Details if applicable:           Details if applicable:           Details if applicable:     24     Total Total     [x]  Patient Education billed concurrently with other procedures   [x] Review HEP      Access Code: [email or web address]  URL: [email or web address]  Date: 04/15/2025  Prepared by: Mohini Hodge    Exercises  - Seated March with Resistance  - 1-2 x daily - 7 x weekly - 2 sets - 10 reps  - Seated Hip Abduction with Resistance  - 1-2 x daily - 7 x weekly - 2 sets - 10 reps  - Seated Long Arc Quad  - 1-2 x daily - 7 x weekly - 2 sets - 10 reps - 3 seconds hold  - Sit to Stand  - 1-2 x daily - 7 x weekly - 1 sets - 10 reps    [] Progressed/Changed HEP, detail:    [] Other detail:         Pain Level at end of session (0-10 scale): 3    Plan of Care / Statement of Necessity for Physical Therapy Services     Assessment / key information:  Pt is a pleasant 70 y.o. male who presents to physical therapy with stiffness and LE weakness s/p lumbar fusion from August 2024. Pt demonstrates slightly increased pain, decreased lumbar AROM, decreased LE strength, decreased transfer ability, decreased stair negotiation, and decreased overall functional mobility with prolonged ambulation. Pt would benefit from skilled physical therapy services to address deficits mentioned above for improvements in functional mobility.     Evaluation Complexity:  History:  HIGH Complexity :3+ comorbidities / personal factors will impact the outcome/ POC ; Examination:  MEDIUM Complexity : 3 Standardized tests and measures addressin body structure, function, activity limitation and / or participation in recreation  ;Presentation:  LOW Complexity : Stable, uncomplicated

## 2025-04-22 ENCOUNTER — HOSPITAL ENCOUNTER (OUTPATIENT)
Facility: HOSPITAL | Age: 70
Setting detail: RECURRING SERIES
Discharge: HOME | End: 2025-04-25
Payer: MEDICARE

## 2025-04-22 PROCEDURE — 97110 THERAPEUTIC EXERCISES: CPT

## 2025-04-22 NOTE — PROGRESS NOTES
PHYSICAL THERAPY - MEDICARE DAILY TREATMENT NOTE (updated 3/23)      Date: 2025          Patient Name:  Jesu Schrader :  1955   Medical   Diagnosis:  Spinal stenosis, lumbar region without neurogenic claudication [M48.061] Treatment Diagnosis:  M54.59  OTHER LOWER BACK PAIN    Referral Source:  Atilio Castillo MD Insurance:   Payor: MEDICARE / Plan: MEDICARE PART A AND B / Product Type: *No Product type* /                     Patient  verified yes     Visit #   Current  / Total 2 24   Time   In / Out 945 1030   Total Treatment Time 45   Total Timed Codes 43   1:1 Treatment Time 43      [facility] Totals Reminder:  bill using total billable   min of TIMED therapeutic procedures and modalities.   8-22 min = 1 unit; 23-37 min = 2 units; 38-52 min = 3 units; 53-67 min = 4 units; 68-82 min = 5 units            SUBJECTIVE    Pain Level (0-10 scale): 3    Any medication changes, allergies to medications, adverse drug reactions, diagnosis change, or new procedure performed?: [x] No    [] Yes (see summary sheet for update)  Medications: Verified on Patient Summary List    Subjective functional status/changes:     A little sore because I did my exercises this morning.     OBJECTIVE      Therapeutic Procedures:  Tx Min Billable or 1:1 Min (if diff from Tx Min) Procedure, Rationale, Specifics   43  30029 Therapeutic Exercise (timed):  increase ROM, strength, coordination, balance, and proprioception to improve patient's ability to progress to PLOF and address remaining functional goals. (see flow sheet as applicable)     Details if applicable:       69681 Manual Therapy (timed):  decrease pain, increase ROM, and increase tissue extensibility to improve patient's ability to progress to PLOF and address remaining functional goals.  The manual therapy interventions were performed at a separate and distinct time from the therapeutic activities interventions . (see flow sheet as applicable)     Details if

## 2025-04-25 ENCOUNTER — HOSPITAL ENCOUNTER (OUTPATIENT)
Facility: HOSPITAL | Age: 70
Setting detail: RECURRING SERIES
Discharge: HOME | End: 2025-04-28
Payer: MEDICARE

## 2025-04-25 PROCEDURE — 97110 THERAPEUTIC EXERCISES: CPT

## 2025-04-25 NOTE — PROGRESS NOTES
PHYSICAL THERAPY - MEDICARE DAILY TREATMENT NOTE (updated 3/23)      Date: 2025          Patient Name:  Jesu Schrader :  1955   Medical   Diagnosis:  Spinal stenosis, lumbar region without neurogenic claudication [M48.061] Treatment Diagnosis:  M54.59  OTHER LOWER BACK PAIN    Referral Source:  Atilio Castillo MD Insurance:   Payor: MEDICARE / Plan: MEDICARE PART A AND B / Product Type: *No Product type* /                     Patient  verified yes     Visit #   Current  / Total 3 24   Time   In / Out 10:40 11:25   Total Treatment Time 45   Total Timed Codes 40   1:1 Treatment Time 40      [facility] Totals Reminder:  bill using total billable   min of TIMED therapeutic procedures and modalities.   8-22 min = 1 unit; 23-37 min = 2 units; 38-52 min = 3 units; 53-67 min = 4 units; 68-82 min = 5 units            SUBJECTIVE    Pain Level (0-10 scale): 3    Any medication changes, allergies to medications, adverse drug reactions, diagnosis change, or new procedure performed?: [x] No    [] Yes (see summary sheet for update)  Medications: Verified on Patient Summary List    Subjective functional status/changes:     A little sore because I did my exercises this morning.     OBJECTIVE      Therapeutic Procedures:  Tx Min Billable or 1:1 Min (if diff from Tx Min) Procedure, Rationale, Specifics   40  21610 Therapeutic Exercise (timed):  increase ROM, strength, coordination, balance, and proprioception to improve patient's ability to progress to PLOF and address remaining functional goals. (see flow sheet as applicable)     Details if applicable:       99800 Manual Therapy (timed):  decrease pain, increase ROM, and increase tissue extensibility to improve patient's ability to progress to PLOF and address remaining functional goals.  The manual therapy interventions were performed at a separate and distinct time from the therapeutic activities interventions . (see flow sheet as applicable)     Details if

## 2025-04-29 ENCOUNTER — HOSPITAL ENCOUNTER (OUTPATIENT)
Facility: HOSPITAL | Age: 70
Setting detail: RECURRING SERIES
Discharge: HOME | End: 2025-05-02
Payer: MEDICARE

## 2025-04-29 PROCEDURE — 97110 THERAPEUTIC EXERCISES: CPT

## 2025-04-29 NOTE — PROGRESS NOTES
PHYSICAL THERAPY - MEDICARE DAILY TREATMENT NOTE (updated 3/23)      Date: 2025          Patient Name:  Jesu Schrader :  1955   Medical   Diagnosis:  Spinal stenosis, lumbar region without neurogenic claudication [M48.061] Treatment Diagnosis:  M54.59  OTHER LOWER BACK PAIN    Referral Source:  Atilio Castillo MD Insurance:   Payor: MEDICARE / Plan: MEDICARE PART A AND B / Product Type: *No Product type* /                     Patient  verified yes     Visit #   Current  / Total 4 24   Time   In / Out 10:15 11   Total Treatment Time 45   Total Timed Codes 40   1:1 Treatment Time 40      [facility] Totals Reminder:  bill using total billable   min of TIMED therapeutic procedures and modalities.   8-22 min = 1 unit; 23-37 min = 2 units; 38-52 min = 3 units; 53-67 min = 4 units; 68-82 min = 5 units            SUBJECTIVE    Pain Level (0-10 scale): 0    Any medication changes, allergies to medications, adverse drug reactions, diagnosis change, or new procedure performed?: [x] No    [] Yes (see summary sheet for update)  Medications: Verified on Patient Summary List    Subjective functional status/changes:     \"Tried to walk to my daughters house, but my legs got tired.\"    OBJECTIVE      Therapeutic Procedures:  Tx Min Billable or 1:1 Min (if diff from Tx Min) Procedure, Rationale, Specifics   40  84853 Therapeutic Exercise (timed):  increase ROM, strength, coordination, balance, and proprioception to improve patient's ability to progress to PLOF and address remaining functional goals. (see flow sheet as applicable)     Details if applicable:       77675 Manual Therapy (timed):  decrease pain, increase ROM, and increase tissue extensibility to improve patient's ability to progress to PLOF and address remaining functional goals.  The manual therapy interventions were performed at a separate and distinct time from the therapeutic activities interventions . (see flow sheet as applicable)     Details if

## 2025-05-02 ENCOUNTER — HOSPITAL ENCOUNTER (OUTPATIENT)
Facility: HOSPITAL | Age: 70
Setting detail: RECURRING SERIES
Discharge: HOME | End: 2025-05-05
Payer: MEDICARE

## 2025-05-02 PROCEDURE — 97110 THERAPEUTIC EXERCISES: CPT

## 2025-05-02 NOTE — PROGRESS NOTES
PHYSICAL THERAPY - MEDICARE DAILY TREATMENT NOTE (updated 3/23)      Date: 2025          Patient Name:  Jesu Schrader :  1955   Medical   Diagnosis:  Spinal stenosis, lumbar region without neurogenic claudication [M48.061] Treatment Diagnosis:  M54.59  OTHER LOWER BACK PAIN    Referral Source:  Atilio Castillo MD Insurance:   Payor: MEDICARE / Plan: MEDICARE PART A AND B / Product Type: *No Product type* /                     Patient  verified yes     Visit #   Current  / Total 5 24   Time   In / Out 10:45 11:30   Total Treatment Time 45   Total Timed Codes 38   1:1 Treatment Time 38      [facility] Totals Reminder:  bill using total billable   min of TIMED therapeutic procedures and modalities.   8-22 min = 1 unit; 23-37 min = 2 units; 38-52 min = 3 units; 53-67 min = 4 units; 68-82 min = 5 units            SUBJECTIVE    Pain Level (0-10 scale): 0    Any medication changes, allergies to medications, adverse drug reactions, diagnosis change, or new procedure performed?: [x] No    [] Yes (see summary sheet for update)  Medications: Verified on Patient Summary List    Subjective functional status/changes:     \"Not feeling too bad today\"    OBJECTIVE      Therapeutic Procedures:  Tx Min Billable or 1:1 Min (if diff from Tx Min) Procedure, Rationale, Specifics   38  42054 Therapeutic Exercise (timed):  increase ROM, strength, coordination, balance, and proprioception to improve patient's ability to progress to PLOF and address remaining functional goals. (see flow sheet as applicable)     Details if applicable:       82435 Manual Therapy (timed):  decrease pain, increase ROM, and increase tissue extensibility to improve patient's ability to progress to PLOF and address remaining functional goals.  The manual therapy interventions were performed at a separate and distinct time from the therapeutic activities interventions . (see flow sheet as applicable)     Details if applicable:           Details if

## 2025-05-06 ENCOUNTER — APPOINTMENT (OUTPATIENT)
Facility: HOSPITAL | Age: 70
End: 2025-05-06
Payer: MEDICARE

## 2025-05-08 ENCOUNTER — HOSPITAL ENCOUNTER (OUTPATIENT)
Facility: HOSPITAL | Age: 70
Setting detail: RECURRING SERIES
Discharge: HOME | End: 2025-05-11
Payer: MEDICARE

## 2025-05-08 PROCEDURE — 97110 THERAPEUTIC EXERCISES: CPT

## 2025-05-08 NOTE — PROGRESS NOTES
PHYSICAL THERAPY - MEDICARE DAILY TREATMENT NOTE (updated 3/23)      Date: 2025          Patient Name:  Jesu Schrader :  1955   Medical   Diagnosis:  Spinal stenosis, lumbar region without neurogenic claudication [M48.061] Treatment Diagnosis:  M54.59  OTHER LOWER BACK PAIN    Referral Source:  Atilio Castillo MD Insurance:   Payor: MEDICARE / Plan: MEDICARE PART A AND B / Product Type: *No Product type* /                     Patient  verified yes     Visit #   Current  / Total 6 24   Time   In / Out 10:15 11   Total Treatment Time 45   Total Timed Codes 39   1:1 Treatment Time 39      [facility] Totals Reminder:  bill using total billable   min of TIMED therapeutic procedures and modalities.   8-22 min = 1 unit; 23-37 min = 2 units; 38-52 min = 3 units; 53-67 min = 4 units; 68-82 min = 5 units            SUBJECTIVE    Pain Level (0-10 scale): 0    Any medication changes, allergies to medications, adverse drug reactions, diagnosis change, or new procedure performed?: [x] No    [] Yes (see summary sheet for update)  Medications: Verified on Patient Summary List    Subjective functional status/changes:     \"Not feeling too bad today\"    OBJECTIVE      Therapeutic Procedures:  Tx Min Billable or 1:1 Min (if diff from Tx Min) Procedure, Rationale, Specifics   39  55184 Therapeutic Exercise (timed):  increase ROM, strength, coordination, balance, and proprioception to improve patient's ability to progress to PLOF and address remaining functional goals. (see flow sheet as applicable)     Details if applicable:       64061 Manual Therapy (timed):  decrease pain, increase ROM, and increase tissue extensibility to improve patient's ability to progress to PLOF and address remaining functional goals.  The manual therapy interventions were performed at a separate and distinct time from the therapeutic activities interventions . (see flow sheet as applicable)     Details if applicable:           Details if

## 2025-05-14 ENCOUNTER — HOSPITAL ENCOUNTER (OUTPATIENT)
Facility: HOSPITAL | Age: 70
Setting detail: RECURRING SERIES
Discharge: HOME | End: 2025-05-17
Payer: MEDICARE

## 2025-05-14 ENCOUNTER — APPOINTMENT (OUTPATIENT)
Facility: HOSPITAL | Age: 70
End: 2025-05-14
Payer: MEDICARE

## 2025-05-14 PROCEDURE — 97110 THERAPEUTIC EXERCISES: CPT

## 2025-05-14 NOTE — PROGRESS NOTES
[facility]  [address], [address]  [place] [address]  Phone: [phone]    Fax: [phone]    PHYSICAL THERAPY PROGRESS NOTE  Patient Name:  Jesu Schrader :  1955   Treatment/Medical Diagnosis: Spinal stenosis, lumbar region without neurogenic claudication [M48.061]   Referral Source:  Atilio Castillo MD     Date of Initial Visit:  4/15/25 Attended Visits:  7 Missed Visits:  0     SUMMARY OF TREATMENT/ASSESSMENT:  Mr. Schrader continues to make progress towards his goals. He reports overall improvement with his symptoms, occasionally will have some stiffness in the morning. He continues to have episodic knee instability, mainly with descending stairs.     AMPAC: 57.67%    CURRENT STATUS/GOALS    Short Term Goals: To be accomplished in 12 treatments.  Pt will be independent and compliant with HEP. MET 25  Pt will be able to increase lumbar lateral flexion AROM to 100% to facilitate improved mobility. Met 25  Long Term Goals: To be accomplished in 24 treatments.  Pt will be able to increase B LE strength to 5/5 to facilitate improved performance of ADLs. Progressing 25  Pt will be able to negotiate 4 stairs with reciprocal gait pattern to facilitate improved stair negotiation. Progressing 25    RECOMMENDATIONS FOR SKILLED THERAPY  Continue POC initiated by PT until all goals are met.          JESU MARKS JR, PTA       2025       1:41 PM    If you have any questions/comments please contact us directly at [phone].   Thank you for allowing us to assist in the care of your patient.

## 2025-05-14 NOTE — PROGRESS NOTES
PHYSICAL THERAPY - MEDICARE DAILY TREATMENT NOTE (updated 3/23)      Date: 2025          Patient Name:  Jesu Schrader :  1955   Medical   Diagnosis:  Spinal stenosis, lumbar region without neurogenic claudication [M48.061] Treatment Diagnosis:  M54.59  OTHER LOWER BACK PAIN    Referral Source:  Atilio Castillo MD Insurance:   Payor: MEDICARE / Plan: MEDICARE PART A AND B / Product Type: *No Product type* /                     Patient  verified yes     Visit #   Current  / Total 7 24   Time   In / Out 1:35 2:20   Total Treatment Time 45   Total Timed Codes 40   1:1 Treatment Time 40      [facility] Totals Reminder:  bill using total billable   min of TIMED therapeutic procedures and modalities.   8-22 min = 1 unit; 23-37 min = 2 units; 38-52 min = 3 units; 53-67 min = 4 units; 68-82 min = 5 units            SUBJECTIVE    Pain Level (0-10 scale): 0    Any medication changes, allergies to medications, adverse drug reactions, diagnosis change, or new procedure performed?: [x] No    [] Yes (see summary sheet for update)  Medications: Verified on Patient Summary List    Subjective functional status/changes:     \"Not feeling too bad today\"    OBJECTIVE      Therapeutic Procedures:  Tx Min Billable or 1:1 Min (if diff from Tx Min) Procedure, Rationale, Specifics   40  53846 Therapeutic Exercise (timed):  increase ROM, strength, coordination, balance, and proprioception to improve patient's ability to progress to PLOF and address remaining functional goals. (see flow sheet as applicable)     Details if applicable:       22061 Manual Therapy (timed):  decrease pain, increase ROM, and increase tissue extensibility to improve patient's ability to progress to PLOF and address remaining functional goals.  The manual therapy interventions were performed at a separate and distinct time from the therapeutic activities interventions . (see flow sheet as applicable)     Details if applicable:           Details if

## 2025-05-16 ENCOUNTER — HOSPITAL ENCOUNTER (OUTPATIENT)
Facility: HOSPITAL | Age: 70
Setting detail: RECURRING SERIES
Discharge: HOME | End: 2025-05-19
Payer: MEDICARE

## 2025-05-16 PROCEDURE — 97110 THERAPEUTIC EXERCISES: CPT

## 2025-05-16 NOTE — PROGRESS NOTES
PHYSICAL THERAPY - MEDICARE DAILY TREATMENT NOTE (updated 3/23)      Date: 2025          Patient Name:  Jesu Schrader :  1955   Medical   Diagnosis:  Spinal stenosis, lumbar region without neurogenic claudication [M48.061] Treatment Diagnosis:  M54.59  OTHER LOWER BACK PAIN    Referral Source:  Atilio Castillo MD Insurance:   Payor: MEDICARE / Plan: MEDICARE PART A AND B / Product Type: *No Product type* /                     Patient  verified yes     Visit #   Current  / Total 8 24   Time   In / Out 9:10 9:55   Total Treatment Time 45   Total Timed Codes 38   1:1 Treatment Time 38      [facility] Totals Reminder:  bill using total billable   min of TIMED therapeutic procedures and modalities.   8-22 min = 1 unit; 23-37 min = 2 units; 38-52 min = 3 units; 53-67 min = 4 units; 68-82 min = 5 units            SUBJECTIVE    Pain Level (0-10 scale): 2    Any medication changes, allergies to medications, adverse drug reactions, diagnosis change, or new procedure performed?: [x] No    [] Yes (see summary sheet for update)  Medications: Verified on Patient Summary List    Subjective functional status/changes:     \"Had a good workout last session.\"    OBJECTIVE      Therapeutic Procedures:  Tx Min Billable or 1:1 Min (if diff from Tx Min) Procedure, Rationale, Specifics   38  56597 Therapeutic Exercise (timed):  increase ROM, strength, coordination, balance, and proprioception to improve patient's ability to progress to PLOF and address remaining functional goals. (see flow sheet as applicable)     Details if applicable:       39149 Manual Therapy (timed):  decrease pain, increase ROM, and increase tissue extensibility to improve patient's ability to progress to PLOF and address remaining functional goals.  The manual therapy interventions were performed at a separate and distinct time from the therapeutic activities interventions . (see flow sheet as applicable)     Details if applicable:           Details

## 2025-05-20 ENCOUNTER — HOSPITAL ENCOUNTER (OUTPATIENT)
Facility: HOSPITAL | Age: 70
Setting detail: RECURRING SERIES
Discharge: HOME | End: 2025-05-23
Payer: MEDICARE

## 2025-05-20 PROCEDURE — 97110 THERAPEUTIC EXERCISES: CPT

## 2025-05-20 NOTE — PROGRESS NOTES
PHYSICAL THERAPY - MEDICARE DAILY TREATMENT NOTE (updated 3/23)      Date: 2025          Patient Name:  Jesu Schrader :  1955   Medical   Diagnosis:  Spinal stenosis, lumbar region without neurogenic claudication [M48.061] Treatment Diagnosis:  M54.59  OTHER LOWER BACK PAIN    Referral Source:  Atilio Castillo MD Insurance:   Payor: MEDICARE / Plan: MEDICARE PART A AND B / Product Type: *No Product type* /                     Patient  verified yes     Visit #   Current  / Total 9 24   Time   In / Out 11 11:45   Total Treatment Time 45   Total Timed Codes 38   1:1 Treatment Time 38      [facility] Totals Reminder:  bill using total billable   min of TIMED therapeutic procedures and modalities.   8-22 min = 1 unit; 23-37 min = 2 units; 38-52 min = 3 units; 53-67 min = 4 units; 68-82 min = 5 units            SUBJECTIVE    Pain Level (0-10 scale): 2    Any medication changes, allergies to medications, adverse drug reactions, diagnosis change, or new procedure performed?: [x] No    [] Yes (see summary sheet for update)  Medications: Verified on Patient Summary List    Subjective functional status/changes:     No new complaints    OBJECTIVE      Therapeutic Procedures:  Tx Min Billable or 1:1 Min (if diff from Tx Min) Procedure, Rationale, Specifics   38  77781 Therapeutic Exercise (timed):  increase ROM, strength, coordination, balance, and proprioception to improve patient's ability to progress to PLOF and address remaining functional goals. (see flow sheet as applicable)     Details if applicable:       54837 Manual Therapy (timed):  decrease pain, increase ROM, and increase tissue extensibility to improve patient's ability to progress to PLOF and address remaining functional goals.  The manual therapy interventions were performed at a separate and distinct time from the therapeutic activities interventions . (see flow sheet as applicable)     Details if applicable:           Details if applicable:

## 2025-05-21 ENCOUNTER — HOSPITAL ENCOUNTER (OUTPATIENT)
Facility: HOSPITAL | Age: 70
Setting detail: RECURRING SERIES
Discharge: HOME | End: 2025-05-24
Payer: MEDICARE

## 2025-05-21 PROCEDURE — 97110 THERAPEUTIC EXERCISES: CPT

## 2025-05-21 NOTE — PROGRESS NOTES
Details if applicable:            Details if applicable:     39     Total Total           [x]  Patient Education billed concurrently with other procedures   [x] Review HEP    [] Progressed/Changed HEP, detail:    [] Other detail:         Other Objective/Functional Measures      Pain Level at end of session (0-10 scale): 0      Assessment   Pt tolerated TE well no adverse reactions. Continue as tolerated.   Patient will continue to benefit from skilled PT / OT services to modify and progress therapeutic interventions, analyze and address functional mobility deficits, analyze and address ROM deficits, analyze and address strength deficits, analyze and address soft tissue restrictions, analyze and cue for proper movement patterns, and analyze and modify for postural abnormalities to address functional deficits and attain remaining goals.    Progress toward goals / Updated goals:  []  See Progress Note/Recertification    Short Term Goals: To be accomplished in 12 treatments.  Pt will be independent and compliant with HEP. MET 4/22/25  Pt will be able to increase lumbar lateral flexion AROM to 100% to facilitate improved mobility. Met 4/14/25  Long Term Goals: To be accomplished in 24 treatments.  Pt will be able to increase B LE strength to 5/5 to facilitate improved performance of ADLs. Progressing 5/14/25  Pt will be able to negotiate 4 stairs with reciprocal gait pattern to facilitate improved stair negotiation. Progressing 5/14/25      PLAN  Yes  Continue plan of care  Re-Cert Due: 7/14/25  [x]  Upgrade activities as tolerated  []  Discharge due to:  []  Other:      JAMES MARKS JR, PTA       5/21/2025       11:59 AM

## 2025-05-28 ENCOUNTER — HOSPITAL ENCOUNTER (OUTPATIENT)
Facility: HOSPITAL | Age: 70
Setting detail: RECURRING SERIES
Discharge: HOME | End: 2025-05-31
Payer: MEDICARE

## 2025-05-28 PROCEDURE — 97110 THERAPEUTIC EXERCISES: CPT

## 2025-05-28 NOTE — PROGRESS NOTES
PHYSICAL THERAPY - MEDICARE DAILY TREATMENT NOTE (updated 3/23)      Date: 2025          Patient Name:  Jesu Schrader :  1955   Medical   Diagnosis:  Spinal stenosis, lumbar region without neurogenic claudication [M48.061] Treatment Diagnosis:  M54.59  OTHER LOWER BACK PAIN    Referral Source:  Atilio Castillo MD Insurance:   Payor: MEDICARE / Plan: MEDICARE PART A AND B / Product Type: *No Product type* /                     Patient  verified yes     Visit #   Current  / Total 11 24   Time   In / Out 1015 11   Total Treatment Time 45   Total Timed Codes 39   1:1 Treatment Time 39      [facility] Totals Reminder:  bill using total billable   min of TIMED therapeutic procedures and modalities.   8-22 min = 1 unit; 23-37 min = 2 units; 38-52 min = 3 units; 53-67 min = 4 units; 68-82 min = 5 units            SUBJECTIVE    Pain Level (0-10 scale): 0    Any medication changes, allergies to medications, adverse drug reactions, diagnosis change, or new procedure performed?: [x] No    [] Yes (see summary sheet for update)  Medications: Verified on Patient Summary List    Subjective functional status/changes:     Pt states that he had a small stumble over the weekend, re aggravating his left knee.     OBJECTIVE      Therapeutic Procedures:  Tx Min Billable or 1:1 Min (if diff from Tx Min) Procedure, Rationale, Specifics   39  34976 Therapeutic Exercise (timed):  increase ROM, strength, coordination, balance, and proprioception to improve patient's ability to progress to PLOF and address remaining functional goals. (see flow sheet as applicable)     Details if applicable:       94730 Manual Therapy (timed):  decrease pain, increase ROM, and increase tissue extensibility to improve patient's ability to progress to PLOF and address remaining functional goals.  The manual therapy interventions were performed at a separate and distinct time from the therapeutic activities interventions . (see flow sheet as

## 2025-05-30 ENCOUNTER — HOSPITAL ENCOUNTER (OUTPATIENT)
Facility: HOSPITAL | Age: 70
Setting detail: RECURRING SERIES
End: 2025-05-30
Payer: MEDICARE

## 2025-05-30 PROCEDURE — 97110 THERAPEUTIC EXERCISES: CPT

## 2025-05-30 NOTE — PROGRESS NOTES
PHYSICAL THERAPY - MEDICARE DAILY TREATMENT NOTE (updated 3/23)      Date: 2025          Patient Name:  Jesu Schrader :  1955   Medical   Diagnosis:  Spinal stenosis, lumbar region without neurogenic claudication [M48.061] Treatment Diagnosis:  M54.59  OTHER LOWER BACK PAIN    Referral Source:  Atilio Castillo MD Insurance:   Payor: MEDICARE / Plan: MEDICARE PART A AND B / Product Type: *No Product type* /                     Patient  verified yes     Visit #   Current  / Total 12 24   Time   In / Out 10 10:45   Total Treatment Time 45   Total Timed Codes 41   1:1 Treatment Time 41      [facility] Totals Reminder:  bill using total billable   min of TIMED therapeutic procedures and modalities.   8-22 min = 1 unit; 23-37 min = 2 units; 38-52 min = 3 units; 53-67 min = 4 units; 68-82 min = 5 units            SUBJECTIVE    Pain Level (0-10 scale): 0    Any medication changes, allergies to medications, adverse drug reactions, diagnosis change, or new procedure performed?: [x] No    [] Yes (see summary sheet for update)  Medications: Verified on Patient Summary List    Subjective functional status/changes:     Pt states that things are going okay today    OBJECTIVE      Therapeutic Procedures:  Tx Min Billable or 1:1 Min (if diff from Tx Min) Procedure, Rationale, Specifics   41  08132 Therapeutic Exercise (timed):  increase ROM, strength, coordination, balance, and proprioception to improve patient's ability to progress to PLOF and address remaining functional goals. (see flow sheet as applicable)     Details if applicable:       61599 Manual Therapy (timed):  decrease pain, increase ROM, and increase tissue extensibility to improve patient's ability to progress to PLOF and address remaining functional goals.  The manual therapy interventions were performed at a separate and distinct time from the therapeutic activities interventions . (see flow sheet as applicable)     Details if applicable:

## 2025-06-05 ENCOUNTER — OFFICE VISIT (OUTPATIENT)
Age: 70
End: 2025-06-05

## 2025-06-05 VITALS
SYSTOLIC BLOOD PRESSURE: 126 MMHG | BODY MASS INDEX: 36.85 KG/M2 | HEART RATE: 84 BPM | TEMPERATURE: 99 F | RESPIRATION RATE: 16 BRPM | HEIGHT: 65 IN | DIASTOLIC BLOOD PRESSURE: 67 MMHG | WEIGHT: 221.2 LBS | OXYGEN SATURATION: 98 %

## 2025-06-05 DIAGNOSIS — E04.1 THYROID NODULE: Primary | ICD-10-CM

## 2025-06-05 DIAGNOSIS — M81.0 AGE RELATED OSTEOPOROSIS, UNSPECIFIED PATHOLOGICAL FRACTURE PRESENCE: ICD-10-CM

## 2025-06-05 DIAGNOSIS — M80.00XA OSTEOPOROSIS WITH CURRENT PATHOLOGICAL FRACTURE, UNSPECIFIED OSTEOPOROSIS TYPE, INITIAL ENCOUNTER: ICD-10-CM

## 2025-06-05 DIAGNOSIS — E04.1 THYROID NODULE: ICD-10-CM

## 2025-06-05 RX ORDER — PHENOL 1.4 %
1 AEROSOL, SPRAY (ML) MUCOUS MEMBRANE 2 TIMES DAILY
Qty: 180 TABLET | Refills: 5 | Status: SHIPPED | OUTPATIENT
Start: 2025-06-05

## 2025-06-05 RX ORDER — LOSARTAN POTASSIUM 50 MG/1
50 TABLET ORAL DAILY
COMMUNITY
Start: 2025-06-03

## 2025-06-05 NOTE — PROGRESS NOTES
[facility]                                      Patient Information Name : Jesu Schrader 70 y.o.   YOB: 1955         Referred by: Mackenzie Castorena FNP         Chief complaint- Hyperparathyroidism    History of Present Illness: Jesu Schrader is a 70 y.o. male here for follow up visit of  hyperparathyroidism     Interval hx-     6-2025: patient reports that he is doing good, back pain is much improved now   3-5-2025:   Doing well, taking vit D but not aware of the dose, not taking any calcium supplements. Completed DEXA scan and US thyroid     :   The patient notes that he is doing well. He has some back pain from the spine surgery for spontaneous vertebral fractures.   He reports completing all his labs but hasn't scheduled DXA scan yet . He does not c/o any swelling in his neck , no hx difficulty swallowing food and water and no difficulty breathing in the supine position.     Background hx:  The patient notes that he had right upper parathyroidectomy in 04/2022 in [facility] by Dr Kang ( ENT) as he was told that his parathyroid hormone levels are elevated . On sestamibi imaging he was found to have a right superior parathyroid adenoma. He was being followed by Dr New at the time.     Frozen section intraoperatively showed a 3 gm right superior parathyroid adenoma , left sided exploration was not done . Post operative recovery was uneventful .     He was also diagnosed with Osteoporosis and received Reclast infusion in 2022. However he hasn't had any other infusions in the last 2 years.     DEXA scan was done in 10/2021:     FINDINGS:     LUMBAR SPINE:  Region: L1-L4  BMD:  0.893 g/sq cm  T score:  -2.8  Z score: -2.0  Measurements indicate osteoporosis lumbar spine.        Region: RIGHT  FEMORAL NECK  BMD: 0.717 g/sq cm  T score:  -2.3  Z score:  -1.0     Region: RIGHT HIP TOTAL  BMD: 0.917 g/sq cm  T score:  -1.5  Z score:

## 2025-06-05 NOTE — PROGRESS NOTES
Have you been to the ER, urgent care clinic since your last visit?  Hospitalized since your last visit?   NO    Have you seen or consulted any other health care providers outside our system since your last visit?   YES - When: approximately 2 days ago.  Where and Why: Dr. Kate.    “Have you had a colorectal cancer screening such as a colonoscopy/FIT/Cologuard?    YES - Type: Colonoscopy - Where: [facility]    No colonoscopy on file  No cologuard on file  No FIT/FOBT on file   No flexible sigmoidoscopy on file     “Have you had a diabetic eye exam?”    NO     No diabetic eye exam on file     Chief Complaint   Patient presents with    Osteoporosis    Follow-up     /67 (BP Site: Right Upper Arm, Patient Position: Sitting, BP Cuff Size: Large Adult)   Pulse 84   Temp 99 °F (37.2 °C) (Temporal)   Resp 16   Ht 1.651 m (5' 5\")   Wt 100.3 kg (221 lb 3.2 oz)   SpO2 98%   BMI 36.81 kg/m²

## 2025-06-10 ENCOUNTER — HOSPITAL ENCOUNTER (OUTPATIENT)
Facility: HOSPITAL | Age: 70
Setting detail: RECURRING SERIES
Discharge: HOME | End: 2025-06-13
Payer: MEDICARE

## 2025-06-10 PROCEDURE — 97110 THERAPEUTIC EXERCISES: CPT

## 2025-06-10 NOTE — PROGRESS NOTES
PHYSICAL THERAPY - MEDICARE DAILY TREATMENT NOTE (updated 3/23)      Date: 6/10/2025          Patient Name:  Jesu Schrader :  1955   Medical   Diagnosis:  Spinal stenosis, lumbar region without neurogenic claudication [M48.061] Treatment Diagnosis:  M54.59  OTHER LOWER BACK PAIN    Referral Source:  Atilio Castillo MD Insurance:   Payor: MEDICARE / Plan: MEDICARE PART A AND B / Product Type: *No Product type* /                     Patient  verified yes     Visit #   Current  / Total 13 24   Time   In / Out 11 11:45   Total Treatment Time 45   Total Timed Codes 40   1:1 Treatment Time 40      [facility] Totals Reminder:  bill using total billable   min of TIMED therapeutic procedures and modalities.   8-22 min = 1 unit; 23-37 min = 2 units; 38-52 min = 3 units; 53-67 min = 4 units; 68-82 min = 5 units            SUBJECTIVE    Pain Level (0-10 scale): 0    Any medication changes, allergies to medications, adverse drug reactions, diagnosis change, or new procedure performed?: [x] No    [] Yes (see summary sheet for update)  Medications: Verified on Patient Summary List    Subjective functional status/changes:     Pt states that things are going okay today    OBJECTIVE      Therapeutic Procedures:  Tx Min Billable or 1:1 Min (if diff from Tx Min) Procedure, Rationale, Specifics   40  50625 Therapeutic Exercise (timed):  increase ROM, strength, coordination, balance, and proprioception to improve patient's ability to progress to PLOF and address remaining functional goals. (see flow sheet as applicable)     Details if applicable:       80431 Manual Therapy (timed):  decrease pain, increase ROM, and increase tissue extensibility to improve patient's ability to progress to PLOF and address remaining functional goals.  The manual therapy interventions were performed at a separate and distinct time from the therapeutic activities interventions . (see flow sheet as applicable)     Details if applicable:

## 2025-06-12 ENCOUNTER — HOSPITAL ENCOUNTER (OUTPATIENT)
Facility: HOSPITAL | Age: 70
Setting detail: RECURRING SERIES
Discharge: HOME | End: 2025-06-15
Payer: MEDICARE

## 2025-06-12 PROCEDURE — 97110 THERAPEUTIC EXERCISES: CPT

## 2025-06-12 NOTE — PROGRESS NOTES
[facility]  [address], [address]  [place] [address]  Phone: [phone]    Fax: [phone]    PHYSICAL THERAPY PROGRESS NOTE  Patient Name:  Jesu Schrader :  1955   Treatment/Medical Diagnosis: Spinal stenosis, lumbar region without neurogenic claudication [M48.061]   Referral Source:  Atilio Castillo MD     Date of Initial Visit:  4/15/25 Attended Visits:  14 Missed Visits:  0     SUMMARY OF TREATMENT/ASSESSMENT:  Mr. Schrader continues to make progress towards his goals. He continues to have tightness and pain on the right side of his back; has made an appt with his urologist. He mentions that his left foot has begun to swell, and has been wearing compression socks which has helped some. Pt continues to episodic RLE instability, but denies any recent falls.     CURRENT STATUS/GOALS  Short Term Goals: To be accomplished in 12 treatments.  Pt will be independent and compliant with HEP. MET 25  Pt will be able to increase lumbar lateral flexion AROM to 100% to facilitate improved mobility. Met 25  Long Term Goals: To be accomplished in 24 treatments.  Pt will be able to increase B LE strength to 5/5 to facilitate improved performance of ADLs. Progressing 25  Pt will be able to negotiate 4 stairs with reciprocal gait pattern to facilitate improved stair negotiation. Met 25    RECOMMENDATIONS FOR SKILLED THERAPY  Continue POC initiated by PT until all goals are met.          JESU MARKS JR, PTA       2025       11:06 AM    If you have any questions/comments please contact us directly at [phone].   Thank you for allowing us to assist in the care of your patient.

## 2025-06-12 NOTE — PROGRESS NOTES
PHYSICAL THERAPY - MEDICARE DAILY TREATMENT NOTE (updated 3/23)      Date: 2025          Patient Name:  Jesu Schrader :  1955   Medical   Diagnosis:  Spinal stenosis, lumbar region without neurogenic claudication [M48.061] Treatment Diagnosis:  M54.59  OTHER LOWER BACK PAIN    Referral Source:  Atilio Castillo MD Insurance:   Payor: MEDICARE / Plan: MEDICARE PART A AND B / Product Type: *No Product type* /                     Patient  verified yes     Visit #   Current  / Total 14 24   Time   In / Out 11 11:45   Total Treatment Time 45   Total Timed Codes 40   1:1 Treatment Time 40      [facility] Totals Reminder:  bill using total billable   min of TIMED therapeutic procedures and modalities.   8-22 min = 1 unit; 23-37 min = 2 units; 38-52 min = 3 units; 53-67 min = 4 units; 68-82 min = 5 units            SUBJECTIVE    Pain Level (0-10 scale): 0    Any medication changes, allergies to medications, adverse drug reactions, diagnosis change, or new procedure performed?: [x] No    [] Yes (see summary sheet for update)  Medications: Verified on Patient Summary List    Subjective functional status/changes:     Pt states that things are going okay today    OBJECTIVE      Therapeutic Procedures:  Tx Min Billable or 1:1 Min (if diff from Tx Min) Procedure, Rationale, Specifics   40  62627 Therapeutic Exercise (timed):  increase ROM, strength, coordination, balance, and proprioception to improve patient's ability to progress to PLOF and address remaining functional goals. (see flow sheet as applicable)     Details if applicable:       95538 Manual Therapy (timed):  decrease pain, increase ROM, and increase tissue extensibility to improve patient's ability to progress to PLOF and address remaining functional goals.  The manual therapy interventions were performed at a separate and distinct time from the therapeutic activities interventions . (see flow sheet as applicable)     Details if applicable:

## 2025-06-17 ENCOUNTER — HOSPITAL ENCOUNTER (OUTPATIENT)
Facility: HOSPITAL | Age: 70
Setting detail: RECURRING SERIES
Discharge: HOME | End: 2025-06-20
Payer: MEDICARE

## 2025-06-17 PROCEDURE — 97110 THERAPEUTIC EXERCISES: CPT

## 2025-06-17 NOTE — PROGRESS NOTES
PHYSICAL THERAPY - MEDICARE DAILY TREATMENT NOTE (updated 3/23)      Date: 2025          Patient Name:  Jesu Schrader :  1955   Medical   Diagnosis:  Spinal stenosis, lumbar region without neurogenic claudication [M48.061] Treatment Diagnosis:  M54.59  OTHER LOWER BACK PAIN    Referral Source:  Atilio Castillo MD Insurance:   Payor: MEDICARE / Plan: MEDICARE PART A AND B / Product Type: *No Product type* /                     Patient  verified yes     Visit #   Current  / Total 15 24   Time   In / Out 145 230   Total Treatment Time 45   Total Timed Codes 38   1:1 Treatment Time 38      [facility] Totals Reminder:  bill using total billable   min of TIMED therapeutic procedures and modalities.   8-22 min = 1 unit; 23-37 min = 2 units; 38-52 min = 3 units; 53-67 min = 4 units; 68-82 min = 5 units            SUBJECTIVE    Pain Level (0-10 scale): 0    Any medication changes, allergies to medications, adverse drug reactions, diagnosis change, or new procedure performed?: [x] No    [] Yes (see summary sheet for update)  Medications: Verified on Patient Summary List    Subjective functional status/changes:     Pt states that things are going okay today    OBJECTIVE      Therapeutic Procedures:  Tx Min Billable or 1:1 Min (if diff from Tx Min) Procedure, Rationale, Specifics   38  59293 Therapeutic Exercise (timed):  increase ROM, strength, coordination, balance, and proprioception to improve patient's ability to progress to PLOF and address remaining functional goals. (see flow sheet as applicable)     Details if applicable:       89098 Manual Therapy (timed):  decrease pain, increase ROM, and increase tissue extensibility to improve patient's ability to progress to PLOF and address remaining functional goals.  The manual therapy interventions were performed at a separate and distinct time from the therapeutic activities interventions . (see flow sheet as applicable)     Details if applicable:

## 2025-06-19 ENCOUNTER — HOSPITAL ENCOUNTER (OUTPATIENT)
Facility: HOSPITAL | Age: 70
Setting detail: RECURRING SERIES
Discharge: HOME | End: 2025-06-22
Payer: MEDICARE

## 2025-06-19 PROCEDURE — 97110 THERAPEUTIC EXERCISES: CPT

## 2025-06-19 NOTE — PROGRESS NOTES
PHYSICAL THERAPY - MEDICARE DAILY TREATMENT NOTE (updated 3/23)      Date: 2025          Patient Name:  Jesu Schrader :  1955   Medical   Diagnosis:  Spinal stenosis, lumbar region without neurogenic claudication [M48.061] Treatment Diagnosis:  M54.59  OTHER LOWER BACK PAIN    Referral Source:  Atilio Castillo MD Insurance:   Payor: MEDICARE / Plan: MEDICARE PART A AND B / Product Type: *No Product type* /                     Patient  verified yes     Visit #   Current  / Total 16 24   Time   In / Out 1:55 2:30   Total Treatment Time 35   Total Timed Codes 30   1:1 Treatment Time 30      [facility] Totals Reminder:  bill using total billable   min of TIMED therapeutic procedures and modalities.   8-22 min = 1 unit; 23-37 min = 2 units; 38-52 min = 3 units; 53-67 min = 4 units; 68-82 min = 5 units            SUBJECTIVE    Pain Level (0-10 scale): 0    Any medication changes, allergies to medications, adverse drug reactions, diagnosis change, or new procedure performed?: [x] No    [] Yes (see summary sheet for update)  Medications: Verified on Patient Summary List    Subjective functional status/changes:     Pt states that things are going okay today    OBJECTIVE      Therapeutic Procedures:  Tx Min Billable or 1:1 Min (if diff from Tx Min) Procedure, Rationale, Specifics   30  47657 Therapeutic Exercise (timed):  increase ROM, strength, coordination, balance, and proprioception to improve patient's ability to progress to PLOF and address remaining functional goals. (see flow sheet as applicable)     Details if applicable:       21239 Manual Therapy (timed):  decrease pain, increase ROM, and increase tissue extensibility to improve patient's ability to progress to PLOF and address remaining functional goals.  The manual therapy interventions were performed at a separate and distinct time from the therapeutic activities interventions . (see flow sheet as applicable)     Details if applicable:

## 2025-06-25 ENCOUNTER — HOSPITAL ENCOUNTER (OUTPATIENT)
Facility: HOSPITAL | Age: 70
Setting detail: RECURRING SERIES
Discharge: HOME | End: 2025-06-28
Payer: MEDICARE

## 2025-06-25 PROCEDURE — 97110 THERAPEUTIC EXERCISES: CPT

## 2025-06-25 NOTE — PROGRESS NOTES
PHYSICAL THERAPY - MEDICARE DAILY TREATMENT NOTE (updated 3/23)      Date: 2025          Patient Name:  Jesu Schrader :  1955   Medical   Diagnosis:  Spinal stenosis, lumbar region without neurogenic claudication [M48.061] Treatment Diagnosis:  M54.59  OTHER LOWER BACK PAIN    Referral Source:  Atilio Castillo MD Insurance:   Payor: MEDICARE / Plan: MEDICARE PART A AND B / Product Type: *No Product type* /                     Patient  verified yes     Visit #   Current  / Total 17 24   Time   In / Out 10:55 11:40   Total Treatment Time 45   Total Timed Codes 39   1:1 Treatment Time 39      [facility] Totals Reminder:  bill using total billable   min of TIMED therapeutic procedures and modalities.   8-22 min = 1 unit; 23-37 min = 2 units; 38-52 min = 3 units; 53-67 min = 4 units; 68-82 min = 5 units            SUBJECTIVE    Pain Level (0-10 scale): 2    Any medication changes, allergies to medications, adverse drug reactions, diagnosis change, or new procedure performed?: [x] No    [] Yes (see summary sheet for update)  Medications: Verified on Patient Summary List    Subjective functional status/changes:     Pt states that things are going okay today    OBJECTIVE      Therapeutic Procedures:  Tx Min Billable or 1:1 Min (if diff from Tx Min) Procedure, Rationale, Specifics   39  90993 Therapeutic Exercise (timed):  increase ROM, strength, coordination, balance, and proprioception to improve patient's ability to progress to PLOF and address remaining functional goals. (see flow sheet as applicable)     Details if applicable:       86276 Manual Therapy (timed):  decrease pain, increase ROM, and increase tissue extensibility to improve patient's ability to progress to PLOF and address remaining functional goals.  The manual therapy interventions were performed at a separate and distinct time from the therapeutic activities interventions . (see flow sheet as applicable)     Details if applicable:

## 2025-06-27 ENCOUNTER — HOSPITAL ENCOUNTER (OUTPATIENT)
Facility: HOSPITAL | Age: 70
Setting detail: RECURRING SERIES
Discharge: HOME | End: 2025-06-30
Payer: MEDICARE

## 2025-06-27 PROCEDURE — 97110 THERAPEUTIC EXERCISES: CPT

## 2025-06-27 NOTE — PROGRESS NOTES
PHYSICAL THERAPY - MEDICARE DAILY TREATMENT NOTE (updated 3/23)      Date: 2025          Patient Name:  Jesu Schrader :  1955   Medical   Diagnosis:  Spinal stenosis, lumbar region without neurogenic claudication [M48.061] Treatment Diagnosis:  M54.59  OTHER LOWER BACK PAIN    Referral Source:  Atilio Castillo MD Insurance:   Payor: MEDICARE / Plan: MEDICARE PART A AND B / Product Type: *No Product type* /                     Patient  verified yes     Visit #   Current  / Total 18 24   Time   In / Out 9:05 9:50   Total Treatment Time 45   Total Timed Codes 40   1:1 Treatment Time 40      [facility] Totals Reminder:  bill using total billable   min of TIMED therapeutic procedures and modalities.   8-22 min = 1 unit; 23-37 min = 2 units; 38-52 min = 3 units; 53-67 min = 4 units; 68-82 min = 5 units            SUBJECTIVE    Pain Level (0-10 scale): 2    Any medication changes, allergies to medications, adverse drug reactions, diagnosis change, or new procedure performed?: [x] No    [] Yes (see summary sheet for update)  Medications: Verified on Patient Summary List    Subjective functional status/changes:     No new complaints    OBJECTIVE      Therapeutic Procedures:  Tx Min Billable or 1:1 Min (if diff from Tx Min) Procedure, Rationale, Specifics   40  16084 Therapeutic Exercise (timed):  increase ROM, strength, coordination, balance, and proprioception to improve patient's ability to progress to PLOF and address remaining functional goals. (see flow sheet as applicable)     Details if applicable:       08080 Manual Therapy (timed):  decrease pain, increase ROM, and increase tissue extensibility to improve patient's ability to progress to PLOF and address remaining functional goals.  The manual therapy interventions were performed at a separate and distinct time from the therapeutic activities interventions . (see flow sheet as applicable)     Details if applicable:           Details if applicable:

## 2025-07-01 ENCOUNTER — HOSPITAL ENCOUNTER (OUTPATIENT)
Facility: HOSPITAL | Age: 70
Setting detail: RECURRING SERIES
Discharge: HOME | End: 2025-07-04
Payer: MEDICARE

## 2025-07-01 PROCEDURE — 97110 THERAPEUTIC EXERCISES: CPT

## 2025-07-01 NOTE — PROGRESS NOTES
applicable:           Details if applicable:            Details if applicable:     40     Total Total           [x]  Patient Education billed concurrently with other procedures   [x] Review HEP    [] Progressed/Changed HEP, detail:    [] Other detail:         Other Objective/Functional Measures      Pain Level at end of session (0-10 scale): 2      Assessment   Pt tolerated TE well no adverse reactions. Continue as tolerated. Tolerated increased weights without any increase of symptoms. Remains active outside of PT. No change in symptoms. Pt will complete remaining scheduled visits and be cleared for D/C, pending supervising PTs approval.  Patient will continue to benefit from skilled PT / OT services to modify and progress therapeutic interventions, analyze and address functional mobility deficits, analyze and address ROM deficits, analyze and address strength deficits, analyze and address soft tissue restrictions, analyze and cue for proper movement patterns, and analyze and modify for postural abnormalities to address functional deficits and attain remaining goals.    Progress toward goals / Updated goals:  []  See Progress Note/Recertification    Short Term Goals: To be accomplished in 12 treatments.  Pt will be independent and compliant with HEP. MET 4/22/25  Pt will be able to increase lumbar lateral flexion AROM to 100% to facilitate improved mobility. Met 4/14/25  Long Term Goals: To be accomplished in 24 treatments.  Pt will be able to increase B LE strength to 5/5 to facilitate improved performance of ADLs. Progressing 6/12/25  Pt will be able to negotiate 4 stairs with reciprocal gait pattern to facilitate improved stair negotiation. Met 6/12/25      PLAN  Yes  Continue plan of care  Re-Cert Due: 7/14/25  [x]  Upgrade activities as tolerated  []  Discharge due to:  []  Other:      JAMES MARKS JR, PTA       7/1/2025       11:18 AM

## 2025-07-03 ENCOUNTER — HOSPITAL ENCOUNTER (OUTPATIENT)
Facility: HOSPITAL | Age: 70
Setting detail: RECURRING SERIES
Discharge: HOME | End: 2025-07-06
Payer: MEDICARE

## 2025-07-03 PROCEDURE — 97110 THERAPEUTIC EXERCISES: CPT

## 2025-07-03 NOTE — PROGRESS NOTES
PHYSICAL THERAPY - MEDICARE DAILY TREATMENT NOTE (updated 3/23)      Date: 7/3/2025          Patient Name:  Jesu Schrader :  1955   Medical   Diagnosis:  Spinal stenosis, lumbar region without neurogenic claudication [M48.061] Treatment Diagnosis:  M54.59  OTHER LOWER BACK PAIN    Referral Source:  Atilio Castillo MD Insurance:   Payor: MEDICARE / Plan: MEDICARE PART A AND B / Product Type: *No Product type* /                     Patient  verified yes     Visit #   Current  / Total 20 24   Time   In / Out 11:05 11:50   Total Treatment Time 45   Total Timed Codes 40   1:1 Treatment Time 40      [facility] Totals Reminder:  bill using total billable   min of TIMED therapeutic procedures and modalities.   8-22 min = 1 unit; 23-37 min = 2 units; 38-52 min = 3 units; 53-67 min = 4 units; 68-82 min = 5 units            SUBJECTIVE    Pain Level (0-10 scale): 2    Any medication changes, allergies to medications, adverse drug reactions, diagnosis change, or new procedure performed?: [x] No    [] Yes (see summary sheet for update)  Medications: Verified on Patient Summary List    Subjective functional status/changes:     \"Feeling a little better today.    OBJECTIVE      Therapeutic Procedures:  Tx Min Billable or 1:1 Min (if diff from Tx Min) Procedure, Rationale, Specifics   40  06304 Therapeutic Exercise (timed):  increase ROM, strength, coordination, balance, and proprioception to improve patient's ability to progress to PLOF and address remaining functional goals. (see flow sheet as applicable)     Details if applicable:       29692 Manual Therapy (timed):  decrease pain, increase ROM, and increase tissue extensibility to improve patient's ability to progress to PLOF and address remaining functional goals.  The manual therapy interventions were performed at a separate and distinct time from the therapeutic activities interventions . (see flow sheet as applicable)     Details if applicable:           Details

## 2025-08-14 LAB
25(OH)D3+25(OH)D2 SERPL-MCNC: 51.3 NG/ML (ref 30–100)
ALBUMIN SERPL-MCNC: 3.8 G/DL (ref 3.9–4.9)
ALP SERPL-CCNC: 112 IU/L (ref 44–121)
ALT SERPL-CCNC: 31 IU/L (ref 0–44)
AST SERPL-CCNC: 28 IU/L (ref 0–40)
BILIRUB SERPL-MCNC: 0.6 MG/DL (ref 0–1.2)
BUN SERPL-MCNC: 13 MG/DL (ref 8–27)
BUN/CREAT SERPL: 9 (ref 10–24)
CALCIUM SERPL-MCNC: 9 MG/DL (ref 8.6–10.2)
CHLORIDE SERPL-SCNC: 106 MMOL/L (ref 96–106)
CO2 SERPL-SCNC: 20 MMOL/L (ref 20–29)
CREAT SERPL-MCNC: 1.39 MG/DL (ref 0.76–1.27)
EGFRCR SERPLBLD CKD-EPI 2021: 55 ML/MIN/1.73
GLOBULIN SER CALC-MCNC: 3.5 G/DL (ref 1.5–4.5)
GLUCOSE SERPL-MCNC: 233 MG/DL (ref 70–99)
POTASSIUM SERPL-SCNC: 3.9 MMOL/L (ref 3.5–5.2)
PROT SERPL-MCNC: 7.3 G/DL (ref 6–8.5)
SODIUM SERPL-SCNC: 142 MMOL/L (ref 134–144)
T4 FREE SERPL-MCNC: 1.2 NG/DL (ref 0.82–1.77)
TSH SERPL DL<=0.005 MIU/L-ACNC: 1.94 UIU/ML (ref 0.45–4.5)

## (undated) DEVICE — MINOR GENERAL PACK: Brand: MEDLINE INDUSTRIES, INC.

## (undated) DEVICE — SYR 10ML LUER LOK 1/5ML GRAD --

## (undated) DEVICE — TOWEL SURG W17XL27IN STD BLU COT NONFENESTRATED PREWASHED

## (undated) DEVICE — SYRINGE ANGIO 20ML WHT POLYCARB VAC PRSS CAP PLUNG FIX M

## (undated) DEVICE — MAGNETIC INSTR DRAPE 20X16: Brand: MEDLINE INDUSTRIES, INC.

## (undated) DEVICE — SYRINGE MED 10ML RED POLYCARB BRL FIX M LUER CONN FLAT GRP

## (undated) DEVICE — Device

## (undated) DEVICE — CATH BLLN DIL 3.0 X12MM RX -- EUPHORA

## (undated) DEVICE — BOWL UTIL 16OZ STRL --

## (undated) DEVICE — DRAPE,REIN 53X77,STERILE: Brand: MEDLINE

## (undated) DEVICE — SURGICAL PROCEDURE TRAY CRD CATH 3 PRT

## (undated) DEVICE — TOOL INSRT ANGI GDWIRE MTL SS --

## (undated) DEVICE — GUIDEWIRE VASC L260CM DIA0.035IN TIP L3MM STD EXCHG PTFE J

## (undated) DEVICE — PRESSURE TUBING: Brand: TRUWAVE

## (undated) DEVICE — CURVED, SMALL JAW, OPEN SEALER/DIVIDER: Brand: LIGASURE

## (undated) DEVICE — 3M™ TEGADERM™ TRANSPARENT FILM DRESSING FRAME STYLE, 1626W, 4 IN X 4-3/4 IN (10 CM X 12 CM), 50/CT 4CT/CASE: Brand: 3M™ TEGADERM™

## (undated) DEVICE — SPONGE: SPECIALTY PEANUT XR 100/CS: Brand: MEDICAL ACTION INDUSTRIES

## (undated) DEVICE — GARMENT,MEDLINE,DVT,INT,CALF,MED, GEN2: Brand: MEDLINE

## (undated) DEVICE — 3M™ STERI-DRAPE™ INSTRUMENT POUCH 1018: Brand: STERI-DRAPE™

## (undated) DEVICE — CATH GUID COR JL3.5 6FR 100CM -- LAUNCHER

## (undated) DEVICE — SUTURE VCRL SZ 4-0 L27IN ABSRB UD L17MM RB-1 1/2 CIR J214H

## (undated) DEVICE — INTENDED FOR TISSUE SEPARATION, AND OTHER PROCEDURES THAT REQUIRE A SHARP SURGICAL BLADE TO PUNCTURE OR CUT.: Brand: BARD-PARKER ® CARBON RIB-BACK BLADES

## (undated) DEVICE — SYRINGE IRRIG 60ML SFT PLIABLE BLB EZ TO GRP 1 HND USE W/

## (undated) DEVICE — INSULATED BLADE ELECTRODE: Brand: EDGE

## (undated) DEVICE — GLIDESHEATH SLENDER STAINLESS STEEL KIT: Brand: GLIDESHEATH SLENDER

## (undated) DEVICE — 3-0 COATED VICRYL PLUS UNDYED 1X27" SH --

## (undated) DEVICE — SUT 5-0 18IN P-3 UD -- MONOCRYL PLUS

## (undated) DEVICE — SUTURE PLN GUT SZ 5-0 L18IN ABSRB YELLOWISH TAN L13MM PC-1 1915G

## (undated) DEVICE — LAMINECTOMY ARM CRADLE FOAM POSITIONER: Brand: CARDINAL HEALTH

## (undated) DEVICE — SUTURE 3-0 VCRL CTD SH-1 J219H

## (undated) DEVICE — CATHETER ETER DIAG JR 4 JL 4 PGTL 52FR SUP TORQ + MULTIPAC

## (undated) DEVICE — SUT VCRL + 4-0 27IN FS2 UD --

## (undated) DEVICE — SYRINGE MED 10ML PUR GAM COMPATIBLE POLYCARB FIX M LUER CONN

## (undated) DEVICE — 3M™ STERI-DRAPE™ SMALL DRAPE WITH ADHESIVE APERTURE 1092 25/BX,4/CS&#X20;: Brand: STERI-DRAPE™

## (undated) DEVICE — 3M™ TEGADERM™ TRANSPARENT FILM DRESSING FRAME STYLE, 1624W, 2-3/8 IN X 2-3/4 IN (6 CM X 7 CM), 100/CT 4CT/CASE: Brand: 3M™ TEGADERM™

## (undated) DEVICE — FORCEPS BIPOLAR 8.25IN .75MM STRAIGHT BAYONET

## (undated) DEVICE — RUNTHROUGH NS EXTRA FLOPPY PTCA GUIDEWIRE: Brand: RUNTHROUGH

## (undated) DEVICE — SUT MONOCRYL PLUS UD 4-0 --

## (undated) DEVICE — SUTURE PERMAHAND SZ 2-0 L18IN NONABSORBABLE BLK L26MM SH C012D

## (undated) DEVICE — DRAIN SURG W7MMXL20CM SIL FLAT HUBLESS W/ RADPQ STRP 3/4

## (undated) DEVICE — STAPLER SKIN H3.9MM WIRE DIA0.58MM CRWN 6.9MM 35 STPL FIX

## (undated) DEVICE — PAD,NON-ADHERENT,2X3,STERILE,LF,1/PK: Brand: MEDLINE

## (undated) DEVICE — ELECTRODE 8227411 PAIRED 4 CH SET ROHS

## (undated) DEVICE — BASIC SINGLE BASIN-LF: Brand: MEDLINE INDUSTRIES, INC.

## (undated) DEVICE — SHEET, DRAPE, SPLIT, STERILE: Brand: MEDLINE

## (undated) DEVICE — CORD BPLR 12FT SGL USE CLR

## (undated) DEVICE — Device: Brand: OMNIWIRE PRESSURE GUIDE WIRE

## (undated) DEVICE — SPONGE GZ W4XL4IN 16 PLY DATA MSTR TAGGED DBL RADPQ

## (undated) DEVICE — DEVICE INFL SYR BLLN ENDO 30 -- INTELLI

## (undated) DEVICE — TUBING PRESS INJ FLX SH 30IN --

## (undated) DEVICE — SOUTHSIDE TURNOVER: Brand: MEDLINE INDUSTRIES, INC.

## (undated) DEVICE — GLOVE ORANGE PI 7 1/2   MSG9075

## (undated) DEVICE — GOWN,PREVENTION PLUS,XLN/XL,ST,24/CS: Brand: MEDLINE

## (undated) DEVICE — HYPODERMIC SAFETY NEEDLE: Brand: MONOJECT

## (undated) DEVICE — WET SKIN PREP TRAY: Brand: MEDLINE INDUSTRIES, INC.

## (undated) DEVICE — COPILOT BLEEDBACK CONTROL VALVE: Brand: COPILOT